# Patient Record
Sex: MALE | Race: WHITE | Employment: FULL TIME | URBAN - METROPOLITAN AREA
[De-identification: names, ages, dates, MRNs, and addresses within clinical notes are randomized per-mention and may not be internally consistent; named-entity substitution may affect disease eponyms.]

---

## 2017-03-14 ENCOUNTER — OFFICE VISIT (OUTPATIENT)
Dept: URGENT CARE | Facility: URGENT CARE | Age: 57
End: 2017-03-14
Payer: COMMERCIAL

## 2017-03-14 VITALS
HEART RATE: 58 BPM | SYSTOLIC BLOOD PRESSURE: 120 MMHG | BODY MASS INDEX: 25.29 KG/M2 | DIASTOLIC BLOOD PRESSURE: 72 MMHG | WEIGHT: 181.25 LBS | OXYGEN SATURATION: 99 % | TEMPERATURE: 97 F

## 2017-03-14 DIAGNOSIS — S61.209A OPEN WOUND OF FINGER, INITIAL ENCOUNTER: Primary | ICD-10-CM

## 2017-03-14 PROCEDURE — 12001 RPR S/N/AX/GEN/TRNK 2.5CM/<: CPT | Performed by: FAMILY MEDICINE

## 2017-03-14 PROCEDURE — 99207 ZZC NO CHARGE LOS: CPT | Performed by: FAMILY MEDICINE

## 2017-03-14 NOTE — NURSING NOTE
Triaged for finger laceration. Reports episode was 1 hour ago and cut little finger with bread knife. About 1 inch cut with bleeding controlled. Reports end of finger has some numbness. Given dressing to applied continued pressure to cut. Stable and in no distress. Will wait for rooming in . Brooke Kahn RN

## 2017-03-14 NOTE — MR AVS SNAPSHOT
After Visit Summary   3/14/2017    Forest Sandoval    MRN: [id number]           Patient Information     Date Of Birth          1960        Visit Information        Provider Department      3/14/2017 6:40 PM Nate Kent MD [facility]        Today's Diagnoses     Open wound of finger, initial encounter    -  1      Care Instructions      INSTRUCTIONS TO PATIENTS AND RELATIVES REGARDING LACERATIONS    These instructions are for persons who have had lacerations (jagged or deep wounds or cuts) that require special bandaging and/or stitches.    1.  DO NOT REMOVE the bandage applied by the nurse or physician for 24 hours unless you have received other instructions from you physician.    AFTER 24 HOURS:    2.  KEEP AREA CLEAN, DRY AND COVERED.        *  Clean abrasions and lacerations without stitches 1 -2 times a day with soap and water.      *  DO NO  SOAK or IMMERSE LACERATION in water for a prolonged length of time such as bathing, swimming or washing dishes.      *  APPLY ANTIBIOTIC OINTMENT and a clean bandage.      DO NOT WASH AREAS WITH STITCHS OR ALLOW STITICHS TO BECOME WET. KEEP AREA CLEAN, DRY AND COVERED.      FACIAL AND SCALP LACERAATIONS DO NOT NEED A BANDAGE    3.  TREAT PAIN OR DISCOMFORT WITH:      *  Ibuprofen or Tylenol every four hours.      *  ELEVATE wound to decrease swelling and pain.    4.  WATCH FOR SIGNS OF INFECTION      *  Redness      *  Swelling      *  Pus      *  Red streaks around wound      *  Increased pain      *  Fever      In the event that any of the symptoms listed occur, call your physician or the Emergency Room.     [facility] [place]  [phone]  [facility] [place]  [phone]  [facility] [place]  [phone]  [place] [facility]  [phone]  [facility] EMERGENCY ROOM  [phone]    ADDITIONAL INSTRUCTIONS          Follow-ups after  "your visit        Who to contact     If you have questions or need follow up information about today's clinic visit or your schedule please contact [facility] directly at [phone].  Normal or non-critical lab and imaging results will be communicated to you by MyChart, letter or phone within 4 business days after the clinic has received the results. If you do not hear from us within 7 days, please contact the clinic through MyChart or phone. If you have a critical or abnormal lab result, we will notify you by phone as soon as possible.  Submit refill requests through [facility] or call your pharmacy and they will forward the refill request to us. Please allow 3 business days for your refill to be completed.          Additional Information About Your Visit        [facility] Information     [facility] lets you send messages to your doctor, view your test results, renew your prescriptions, schedule appointments and more. To sign up, go to [email or web address] . Click on \"Log in\" on the left side of the screen, which will take you to the Welcome page. Then click on \"Sign up Now\" on the right side of the page.     You will be asked to enter the access code listed below, as well as some personal information. Please follow the directions to create your username and password.     Your access code is: [email or web address]  Expires: 2017  8:25 PM     Your access code will  in 90 days. If you need help or a new code, please call your [facility] clinic or [phone].        Care EveryWhere ID     This is your Care EveryWhere ID. This could be used by other organizations to access your [facility] medical records  [id number]        Your Vitals Were     Pulse Temperature Pulse Oximetry BMI (Body Mass Index)          58 97  F (36.1  C) (Oral) 99% 25.29 kg/m2         Blood Pressure from Last 3 Encounters:   17 120/72   12/28/15 104/74   06/19/15 113/76    Weight from Last 3 Encounters:   17 181 lb 4 oz " (82.2 kg)   12/23/15 173 lb 12.8 oz (78.8 kg)   06/19/15 173 lb 12.8 oz (78.8 kg)              We Performed the Following     WOUND CLOSURE BY ADHESIVE []          Today's Medication Changes          These changes are accurate as of: 3/14/17  8:25 PM.  If you have any questions, ask your nurse or doctor.               Stop taking these medicines if you haven't already. Please contact your care team if you have questions.     Azelaic Acid 15 % gel   Commonly known as:  FINACEA   Stopped by:  Nate Kent MD           doxycycline Monohydrate 50 MG Caps capsule   Stopped by:  Nate Kent MD           metroNIDAZOLE 0.75 % topical gel   Commonly known as:  METROGEL   Stopped by:  Nate Kent MD                    Primary Care Provider Office Phone # Fax Bradley Tobaric Quinn Sebastian PA-C [phone]       XXX RESIGNED XXX [id number]  [place] [address]        Thank you!     Thank you for choosing [facility]  for your care. Our goal is always to provide you with excellent care. Hearing back from our patients is one way we can continue to improve our services. Please take a few minutes to complete the written survey that you may receive in the mail after your visit with us. Thank you!             Your Updated Medication List - Protect others around you: Learn how to safely use, store and throw away your medicines at www.disposemymeds.org.      Notice  As of 3/14/2017  8:25 PM    You have not been prescribed any medications.

## 2017-03-15 NOTE — PATIENT INSTRUCTIONS
INSTRUCTIONS TO PATIENTS AND RELATIVES REGARDING LACERATIONS    These instructions are for persons who have had lacerations (jagged or deep wounds or cuts) that require special bandaging and/or stitches.    1.  DO NOT REMOVE the bandage applied by the nurse or physician for 24 hours unless you have received other instructions from you physician.    AFTER 24 HOURS:    2.  KEEP AREA CLEAN, DRY AND COVERED.        *  Clean abrasions and lacerations without stitches 1 -2 times a day with soap and water.      *  DO NO  SOAK or IMMERSE LACERATION in water for a prolonged length of time such as bathing, swimming or washing dishes.      *  APPLY ANTIBIOTIC OINTMENT and a clean bandage.      DO NOT WASH AREAS WITH STITCHS OR ALLOW STITICHS TO BECOME WET. KEEP AREA CLEAN, DRY AND COVERED.      FACIAL AND SCALP LACERAATIONS DO NOT NEED A BANDAGE    3.  TREAT PAIN OR DISCOMFORT WITH:      *  Ibuprofen or Tylenol every four hours.      *  ELEVATE wound to decrease swelling and pain.    4.  WATCH FOR SIGNS OF INFECTION      *  Redness      *  Swelling      *  Pus      *  Red streaks around wound      *  Increased pain      *  Fever      In the event that any of the symptoms listed occur, call your physician or the Emergency Room.     [facility] [place]  [phone]  [facility] [place]  [phone]  [facility] IN [place]  [phone]  [place] [facility]  [phone]  [facility] EMERGENCY ROOM  [phone]    ADDITIONAL INSTRUCTIONS

## 2017-03-15 NOTE — NURSING NOTE
"Chief Complaint   Patient presents with     Laceration       Initial /72  Pulse 58  Temp 97  F (36.1  C) (Oral)  Wt 181 lb 4 oz (82.2 kg)  SpO2 99%  BMI 25.29 kg/m2 Estimated body mass index is 25.29 kg/(m^2) as calculated from the following:    Height as of 12/23/15: 5' 10.98\" (1.803 m).    Weight as of this encounter: 181 lb 4 oz (82.2 kg).  Medication Reconciliation: complete       ARLENE Smith      "

## 2017-03-15 NOTE — PROGRESS NOTES
SUBJECTIVE:                                                    Forest Sandoval is a 56 year old male who presents to clinic today for the following health issues:      Laceration      Duration: 1 hour ago     Description (location/character/radiation): right pinky     History: was reaching bread knife from block     Precipitating or alleviating factors: UTD on Tdap            Problem list and histories reviewed & adjusted, as indicated.  Additional history: as documented    Patient Active Problem List   Diagnosis     Hyperlipidemia     Dermatitis     BPH (benign prostatic hyperplasia)     DJD (degenerative joint disease) of knee     COPD, mild (H)     Generalized anxiety disorder     Past Surgical History   Procedure Laterality Date     No history of surgery       Colonoscopy N/A 12/28/2015     Procedure: COLONOSCOPY;  Surgeon: eDz Pickard MD;  Location: [facility] OR     Colonoscopy with co2 insufflation N/A 12/28/2015     Procedure: COLONOSCOPY WITH CO2 INSUFFLATION;  Surgeon: Dez Pickard MD;  Location: [facility] OR       Social History   Substance Use Topics     Smoking status: Former Smoker     Smokeless tobacco: Never Used     Alcohol use Not on file     Family History   Problem Relation Age of Onset     Cardiovascular Mother      Cardiovascular Father      Alcohol/Drug Father      CANCER Sister      BCC and Melanoma           ROS:  INTEGUMENTARY/SKIN: 1.5 com laceration to right fifth digit radial border distal phalanx.    OBJECTIVE:                                                    /72  Pulse 58  Temp 97  F (36.1  C) (Oral)  Wt 181 lb 4 oz (82.2 kg)  SpO2 99%  BMI 25.29 kg/m2  Body mass index is 25.29 kg/(m^2).  GENERAL: healthy, alert and no distress  MS: no gross musculoskeletal defects noted, no edema  SKIN: laceration as described in ROS  NEURO: Normal strength and tone and sensory deficit radial side of right 5th digit has numbness to light touch distal to wound.    Diagnostic Test Results:  none       ASSESSMENT/PLAN:                                                            ICD-10-CM    1. Open wound of finger, initial encounter S61.209A WOUND CLOSURE BY ADHESIVE []       See Patient Instructions    Nate Kent MD  [facility]  SUBJECTIVE:   56 year old male sustained laceration of finger 1 hours ago. Nature of injury: cut on clean kitchen knife. Tetanus vaccination status reviewed: last tetanus booster within 10 years.     OBJECTIVE:   Patient appears well, vitals are normal. Laceration 2 cm noted.  Description: clean wound edges, no foreign bodies. See above.     ASSESSMENT:   Laceration as described.    PLAN:   Discussed wound treatment options with patient and opted for steri-strips.  Wound cleansed, debrided of visible foreign material and necrotic tissue, and reinforced with benzoin and steri-strips. Splinted with gauze dressing and coban. Dressing applied.  Wound care instructions provided.  Observe for any signs of infection or other problems.

## 2017-05-14 ENCOUNTER — HOSPITAL ENCOUNTER (EMERGENCY)
Facility: CLINIC | Age: 57
Discharge: HOME | End: 2017-05-14
Attending: PHYSICIAN ASSISTANT | Admitting: PHYSICIAN ASSISTANT
Payer: COMMERCIAL

## 2017-05-14 VITALS
HEIGHT: 72 IN | HEART RATE: 73 BPM | RESPIRATION RATE: 16 BRPM | TEMPERATURE: 97.9 F | BODY MASS INDEX: 23.84 KG/M2 | DIASTOLIC BLOOD PRESSURE: 88 MMHG | OXYGEN SATURATION: 98 % | SYSTOLIC BLOOD PRESSURE: 151 MMHG | WEIGHT: 176 LBS

## 2017-05-14 DIAGNOSIS — T23.239A: ICD-10-CM

## 2017-05-14 DIAGNOSIS — T23.209A: ICD-10-CM

## 2017-05-14 PROCEDURE — 99213 OFFICE O/P EST LOW 20 MIN: CPT | Mod: 25

## 2017-05-14 PROCEDURE — 90471 IMMUNIZATION ADMIN: CPT

## 2017-05-14 PROCEDURE — 90715 TDAP VACCINE 7 YRS/> IM: CPT | Performed by: PHYSICIAN ASSISTANT

## 2017-05-14 PROCEDURE — 25000125 ZZHC RX 250: Performed by: PHYSICIAN ASSISTANT

## 2017-05-14 PROCEDURE — 16020 DRESS/DEBRID P-THICK BURN S: CPT | Performed by: PHYSICIAN ASSISTANT

## 2017-05-14 PROCEDURE — 99213 OFFICE O/P EST LOW 20 MIN: CPT | Mod: 25 | Performed by: PHYSICIAN ASSISTANT

## 2017-05-14 PROCEDURE — 16020 DRESS/DEBRID P-THICK BURN S: CPT

## 2017-05-14 RX ADMIN — CLOSTRIDIUM TETANI TOXOID ANTIGEN (FORMALDEHYDE INACTIVATED), CORYNEBACTERIUM DIPHTHERIAE TOXOID ANTIGEN (FORMALDEHYDE INACTIVATED), BORDETELLA PERTUSSIS TOXOID ANTIGEN (GLUTARALDEHYDE INACTIVATED), BORDETELLA PERTUSSIS FILAMENTOUS HEMAGGLUTININ ANTIGEN (FORMALDEHYDE INACTIVATED), BORDETELLA PERTUSSIS PERTACTIN ANTIGEN, AND BORDETELLA PERTUSSIS FIMBRIAE 2/3 ANTIGEN 0.5 ML: 5; 2; 2.5; 5; 3; 5 INJECTION, SUSPENSION INTRAMUSCULAR at 18:04

## 2017-05-14 NOTE — DISCHARGE INSTRUCTIONS
Second-Degree Burn  A burn occurs when skin is exposed to too much heat, sun, or harsh chemicals. A second-degree burn (partial-thickness burn) is deeper than a first-degree burn (superficial burn). It usually causes a blister to form. The blister may remain intact and gradually go away on its own. Or it may break open. The goal of treatment is to relieve pain and stop infection while the burn heals.   Home care  Use pain medicine as directed. If no pain medicine was prescribed, you may use acetaminophen or ibuprofen to control pain. If you have chronic liver or kidney disease, talk with your health care provider before using these medicine. Also talk with your provider if you've had a stomach ulcer or GI bleeding.  General care    On the first day, you may put a cool compress on the wound to ease pain. A cool compress is a small towel soaked in cool water.    If you were sent home with the blister intact, don't break the blister. The risk for infection is greater if the blister breaks. If a bandage was applied, change it once a day, unless told otherwise. If the bandage becomes wet or soiled, change it as soon as you can.    Sometimes an infection may occur even with proper treatment. Check the burn daily for the signs of infection listed below.    Eat more calories and protein until your wound is healed.    Wear a hat, sunscreen, and long sleeves while in the sun to protect the skin.    Don't pick or scratch at the wound. Use over-the-counter medicines like diphenhydramine for itching.    Avoid tight-fitting clothes.  To change a bandage:    Wash your hands.    Take off the old bandage. If the bandage sticks, soak it off under warm running water.    Once the bandage is off, gently wash the burn area with mild soap and warm water to remove any cream, ointment, ooze, or scab. You may do this in a sink, under a tub faucet, or in the shower. Rinse off the soap and gently pat dry with a clean towel.    Check for  signs of infection listed below.    Put any prescribed antibiotic cream or ointment on the wound.    Cover the burn with nonstick gauze. Then wrap it with the bandage material.  Follow-up care  Follow up with your health care provider, or as advised.  When to seek medical advice  Call your health care provider right away if you have any of these signs of infection:    Fever over 100.4 F (38 C)    Pain that gets worse    Redness or swelling that gets worse    Pus comes from the burn    Red streaks in your skin coming from the burn    Wound doesn't appear to be healing    Nausea or vomiting     1719-9685 [employer]. [address] [place] [address], [place] [address]. All rights reserved. This information is not intended as a substitute for professional medical care. Always follow your healthcare professional's instructions.

## 2017-05-14 NOTE — ED AVS SNAPSHOT
[facility] Emergency Department    [address]    [facility] [address]    Phone:  [phone]    Fax:  [phone]                                       Forest Sandoval   MRN: [id number]    Department:  [facility] Emergency Department   Date of Visit:  5/14/2017           Patient Information     Date Of Birth          1960        Your diagnoses for this visit were:     Partial thickness burn of hand including fingers        You were seen by Anabelle Schuster PA-C.      Follow-up Information     Follow up with [facility]. Call in 1 day.    Why:  [phone] ([phone])    Contact information:    [facility]  [address] [address]          Discharge Instructions         Second-Degree Burn  A burn occurs when skin is exposed to too much heat, sun, or harsh chemicals. A second-degree burn (partial-thickness burn) is deeper than a first-degree burn (superficial burn). It usually causes a blister to form. The blister may remain intact and gradually go away on its own. Or it may break open. The goal of treatment is to relieve pain and stop infection while the burn heals.   Home care  Use pain medicine as directed. If no pain medicine was prescribed, you may use acetaminophen or ibuprofen to control pain. If you have chronic liver or kidney disease, talk with your health care provider before using these medicine. Also talk with your provider if you've had a stomach ulcer or GI bleeding.  General care    On the first day, you may put a cool compress on the wound to ease pain. A cool compress is a small towel soaked in cool water.    If you were sent home with the blister intact, don't break the blister. The risk for infection is greater if the blister breaks. If a bandage was applied, change it once a day, unless told otherwise. If the bandage becomes wet or soiled, change it as soon as you can.    Sometimes an infection may occur even with proper treatment. Check the burn daily  for the signs of infection listed below.    Eat more calories and protein until your wound is healed.    Wear a hat, sunscreen, and long sleeves while in the sun to protect the skin.    Don't pick or scratch at the wound. Use over-the-counter medicines like diphenhydramine for itching.    Avoid tight-fitting clothes.  To change a bandage:    Wash your hands.    Take off the old bandage. If the bandage sticks, soak it off under warm running water.    Once the bandage is off, gently wash the burn area with mild soap and warm water to remove any cream, ointment, ooze, or scab. You may do this in a sink, under a tub faucet, or in the shower. Rinse off the soap and gently pat dry with a clean towel.    Check for signs of infection listed below.    Put any prescribed antibiotic cream or ointment on the wound.    Cover the burn with nonstick gauze. Then wrap it with the bandage material.  Follow-up care  Follow up with your health care provider, or as advised.  When to seek medical advice  Call your health care provider right away if you have any of these signs of infection:    Fever over 100.4 F (38 C)    Pain that gets worse    Redness or swelling that gets worse    Pus comes from the burn    Red streaks in your skin coming from the burn    Wound doesn't appear to be healing    Nausea or vomiting     6128-5001 [employer]. [address] [place] [address], [place] [address]. All rights reserved. This information is not intended as a substitute for professional medical care. Always follow your healthcare professional's instructions.          24 Hour Appointment Hotline       To make an appointment at any [facility] clinic, call [phone] ([phone]). If you don't have a family doctor or clinic, we will help you find one. [facility] clinics are conveniently located to serve the needs of you and your family.             Review of your medicines      Notice     You have not been prescribed any medications.           "  Orders Needing Specimen Collection     None      Pending Results     No orders found from 2017 to 5/15/2017.            Pending Culture Results     No orders found from 2017 to 5/15/2017.            Pending Results Instructions     If you had any lab results that were not finalized at the time of your Discharge, you can call the ED Lab Result RN at [phone]. You will be contacted by this team for any positive Lab results or changes in treatment. The nurses are available 7 days a week from 10A to 6:30P.  You can leave a message 24 hours per day and they will return your call.        Test Results From Your Hospital Stay               Thank you for choosing [facility]       Thank you for choosing [facility] for your care. Our goal is always to provide you with excellent care. Hearing back from our patients is one way we can continue to improve our services. Please take a few minutes to complete the written survey that you may receive in the mail after you visit with us. Thank you!        [facility] Information     [facility] lets you send messages to your doctor, view your test results, renew your prescriptions, schedule appointments and more. To sign up, go to [email or web address] . Click on \"Log in\" on the left side of the screen, which will take you to the Welcome page. Then click on \"Sign up Now\" on the right side of the page.     You will be asked to enter the access code listed below, as well as some personal information. Please follow the directions to create your username and password.     Your access code is: [email or web address]  Expires: 2017  8:25 PM     Your access code will  in 90 days. If you need help or a new code, please call your [facility] clinic or [phone].        Care EveryWhere ID     This is your Care EveryWhere ID. This could be used by other organizations to access your [facility] medical records  [id number]        After Visit Summary       This is your record. Keep this with " you and show to your community pharmacist(s) and doctor(s) at your next visit.
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
## 2017-05-14 NOTE — ED AVS SNAPSHOT
[facility] Emergency Department    [address] [address]    Phone:  [phone]    Fax:  [phone]                                       Forest Sandoval   MRN: [id number]    Department:  [facility] Emergency Department   Date of Visit:  5/14/2017           After Visit Summary Signature Page     I have received my discharge instructions, and my questions have been answered. I have discussed any challenges I see with this plan with the nurse or doctor.    ..........................................................................................................................................  Patient/Patient Representative Signature      ..........................................................................................................................................  Patient Representative Print Name and Relationship to Patient    ..................................................               ................................................  Date                                            Time    ..........................................................................................................................................  Reviewed by Signature/Title    ...................................................              ..............................................  Date                                                            Time

## 2018-06-27 ENCOUNTER — OFFICE VISIT (OUTPATIENT)
Dept: FAMILY MEDICINE | Facility: CLINIC | Age: 58
End: 2018-06-27
Payer: COMMERCIAL

## 2018-06-27 VITALS
WEIGHT: 172 LBS | HEART RATE: 52 BPM | TEMPERATURE: 97.1 F | DIASTOLIC BLOOD PRESSURE: 74 MMHG | SYSTOLIC BLOOD PRESSURE: 113 MMHG | RESPIRATION RATE: 16 BRPM | OXYGEN SATURATION: 96 % | HEIGHT: 71 IN | BODY MASS INDEX: 24.08 KG/M2

## 2018-06-27 DIAGNOSIS — N40.1 BENIGN PROSTATIC HYPERPLASIA WITH URINARY FREQUENCY: ICD-10-CM

## 2018-06-27 DIAGNOSIS — J44.9 COPD, MILD (H): ICD-10-CM

## 2018-06-27 DIAGNOSIS — Z00.01 ENCOUNTER FOR ROUTINE ADULT HEALTH EXAMINATION WITH ABNORMAL FINDINGS: Primary | ICD-10-CM

## 2018-06-27 DIAGNOSIS — R35.0 BENIGN PROSTATIC HYPERPLASIA WITH URINARY FREQUENCY: ICD-10-CM

## 2018-06-27 DIAGNOSIS — E78.5 HYPERLIPIDEMIA LDL GOAL <160: ICD-10-CM

## 2018-06-27 DIAGNOSIS — Z23 NEED FOR VACCINATION: ICD-10-CM

## 2018-06-27 LAB
ALBUMIN SERPL-MCNC: 3.7 G/DL (ref 3.4–5)
ALP SERPL-CCNC: 68 U/L (ref 40–150)
ALT SERPL W P-5'-P-CCNC: 18 U/L (ref 0–70)
ANION GAP SERPL CALCULATED.3IONS-SCNC: 5 MMOL/L (ref 3–14)
AST SERPL W P-5'-P-CCNC: 24 U/L (ref 0–45)
BILIRUB SERPL-MCNC: 0.4 MG/DL (ref 0.2–1.3)
BUN SERPL-MCNC: 20 MG/DL (ref 7–30)
CALCIUM SERPL-MCNC: 9.3 MG/DL (ref 8.5–10.1)
CHLORIDE SERPL-SCNC: 106 MMOL/L (ref 94–109)
CHOLEST SERPL-MCNC: 218 MG/DL
CO2 SERPL-SCNC: 29 MMOL/L (ref 20–32)
CREAT SERPL-MCNC: 1.12 MG/DL (ref 0.66–1.25)
GFR SERPL CREATININE-BSD FRML MDRD: 68 ML/MIN/1.7M2
GLUCOSE SERPL-MCNC: 95 MG/DL (ref 70–99)
HCV AB SERPL QL IA: REACTIVE
HDLC SERPL-MCNC: 66 MG/DL
LDLC SERPL CALC-MCNC: 136 MG/DL
NONHDLC SERPL-MCNC: 152 MG/DL
POTASSIUM SERPL-SCNC: 4.6 MMOL/L (ref 3.4–5.3)
PROT SERPL-MCNC: 7.3 G/DL (ref 6.8–8.8)
PSA SERPL-ACNC: 2.39 UG/L (ref 0–4)
SODIUM SERPL-SCNC: 140 MMOL/L (ref 133–144)
TRIGL SERPL-MCNC: 82 MG/DL

## 2018-06-27 PROCEDURE — 90471 IMMUNIZATION ADMIN: CPT | Performed by: PHYSICIAN ASSISTANT

## 2018-06-27 PROCEDURE — 99396 PREV VISIT EST AGE 40-64: CPT | Mod: 25 | Performed by: PHYSICIAN ASSISTANT

## 2018-06-27 PROCEDURE — 80053 COMPREHEN METABOLIC PANEL: CPT | Performed by: PHYSICIAN ASSISTANT

## 2018-06-27 PROCEDURE — G0103 PSA SCREENING: HCPCS | Performed by: PHYSICIAN ASSISTANT

## 2018-06-27 PROCEDURE — 99213 OFFICE O/P EST LOW 20 MIN: CPT | Mod: 25 | Performed by: PHYSICIAN ASSISTANT

## 2018-06-27 PROCEDURE — 80061 LIPID PANEL: CPT | Performed by: PHYSICIAN ASSISTANT

## 2018-06-27 PROCEDURE — 36415 COLL VENOUS BLD VENIPUNCTURE: CPT | Performed by: PHYSICIAN ASSISTANT

## 2018-06-27 PROCEDURE — 86803 HEPATITIS C AB TEST: CPT | Performed by: PHYSICIAN ASSISTANT

## 2018-06-27 PROCEDURE — 90732 PPSV23 VACC 2 YRS+ SUBQ/IM: CPT | Performed by: PHYSICIAN ASSISTANT

## 2018-06-27 PROCEDURE — 87522 HEPATITIS C REVRS TRNSCRPJ: CPT | Performed by: PHYSICIAN ASSISTANT

## 2018-06-27 RX ORDER — DOXAZOSIN 4 MG/1
4 TABLET ORAL AT BEDTIME
Qty: 90 TABLET | Refills: 1 | Status: SHIPPED | OUTPATIENT
Start: 2018-06-27 | End: 2019-01-04

## 2018-06-27 RX ORDER — ALBUTEROL SULFATE 90 UG/1
1-2 AEROSOL, METERED RESPIRATORY (INHALATION) EVERY 6 HOURS PRN
Qty: 1 INHALER | Refills: 1 | Status: SHIPPED | OUTPATIENT
Start: 2018-06-27 | End: 2023-01-31

## 2018-06-27 NOTE — NURSING NOTE
Screening Questionnaire for Adult Immunization    Are you sick today?   No   Do you have allergies to medications, food, a vaccine component or latex?   Yes   Have you ever had a serious reaction after receiving a vaccination?   No   Do you have a long-term health problem with heart disease, lung disease, asthma, kidney disease, metabolic disease (e.g. diabetes), anemia, or other blood disorder?   No   Do you have cancer, leukemia, HIV/AIDS, or any other immune system problem?   No   In the past 3 months, have you taken medications that affect  your immune system, such as prednisone, other steroids, or anticancer drugs; drugs for the treatment of rheumatoid arthritis, Crohn s disease, or psoriasis; or have you had radiation treatments?   No   Have you had a seizure, or a brain or other nervous system problem?   No   During the past year, have you received a transfusion of blood or blood     products, or been given immune (gamma) globulin or antiviral drug?   No   For women: Are you pregnant or is there a chance you could become        pregnant during the next month?   No   Have you received any vaccinations in the past 4 weeks?   No     Immunization questionnaire was positive for at least one answer.  .        Per orders of Marly Kimball PA-C, injection of [id number] given by Jesu Noguera. Patient instructed to remain in clinic for 15 minutes afterwards, and to report any adverse reaction to me immediately.       Screening performed by Jesu Noguera on 6/27/2018 at 11:57 AM.

## 2018-06-27 NOTE — LETTER
July 3, 2018      Forest Sandoval  [address] [address] [address]        Dear ,    We are writing to inform you of your test results.    Your hepatitis C screen was initially a FALSE positive, the confirmatory test was negative. This can happen, but you DO NOT have hepatitis C.   Electrolytes, kidney function, liver enzymes, and the prostate marker are all normal.   Your blood sugar is normal - no signs of diabetes.   Your LDL (bad) cholesterol is <160 and therefore at goal - your cholesterol has improved.     Resulted Orders   Comprehensive metabolic panel   Result Value Ref Range    Sodium 140 133 - 144 mmol/L    Potassium 4.6 3.4 - 5.3 mmol/L    Chloride 106 94 - 109 mmol/L    Carbon Dioxide 29 20 - 32 mmol/L    Anion Gap 5 3 - 14 mmol/L    Glucose 95 70 - 99 mg/dL      Comment:      Fasting specimen    Urea Nitrogen 20 7 - 30 mg/dL    Creatinine 1.12 0.66 - 1.25 mg/dL    GFR Estimate 68 >60 mL/min/1.7m2      Comment:      Non  GFR Calc    GFR Estimate If Black 82 >60 mL/min/1.7m2      Comment:       GFR Calc    Calcium 9.3 8.5 - 10.1 mg/dL    Bilirubin Total 0.4 0.2 - 1.3 mg/dL    Albumin 3.7 3.4 - 5.0 g/dL    Protein Total 7.3 6.8 - 8.8 g/dL    Alkaline Phosphatase 68 40 - 150 U/L    ALT 18 0 - 70 U/L    AST 24 0 - 45 U/L   Lipid panel reflex to direct LDL Fasting   Result Value Ref Range    Cholesterol 218 (H) <200 mg/dL      Comment:      Desirable:       <200 mg/dl    Triglycerides 82 <150 mg/dL      Comment:      Fasting specimen    HDL Cholesterol 66 >39 mg/dL    LDL Cholesterol Calculated 136 (H) <100 mg/dL      Comment:      Above desirable:  100-129 mg/dl  Borderline High:  130-159 mg/dL  High:             160-189 mg/dL  Very high:       >189 mg/dl      Non HDL Cholesterol 152 (H) <130 mg/dL      Comment:      Above Desirable:  130-159 mg/dl  Borderline high:  160-189 mg/dl  High:             190-219 mg/dl  Very high:       >219 mg/dl     Prostate  spec antigen screen   Result Value Ref Range    PSA 2.39 0 - 4 ug/L      Comment:      Assay Method:  Chemiluminescence using Siemens Vista analyzer   Hepatitis C Screen Reflex to HCV RNA Quant and Genotype   Result Value Ref Range    Hepatitis C Antibody Reactive (AA) NR^Nonreactive      Comment:      A reactive result indicates one of the following 1) current HCV infection 2)   past HCV infection that has resolved or 3) false positivity. The CDC   recommends that a reactive result should be followed by Nucleic acid testing   for HCV RNA.   If HCV RNA is detected, that indicates current HCV infection. If HCV RNA is   not detected, that indicates either past, resolved HCV infection, or false HCV   antibody positivity.  Assay performance characteristics have not been established for newborns,   infants, and children     Hepatitis C RNA Quantitative   Result Value Ref Range    HCV RNA Quant IU/ml HCV RNA Not Detected HCVND^HCV RNA Not Detected [IU]/mL      Comment:      The JULIETA AmpliPrep/JULIETA TaqMan HCV Test is an FDA-approved in vitro nucleic   acid amplification test for the quantitation of HCV RNA in human plasma (ETDA   plasma) or serum using the JULIETA AmpliPrep Instrument for automated viral   nucleic acid extraction and the JULIETA TaqMan Analyzer or JULIETA TaqMan for   automated Real Time PCR amplification and detection of the viral nucleic acid   target.  Titer results are reported in International Units/mL (IU/mL) using the 1st WHO   International standard for HCV for Nucleic Acid Amplification based assays.      Log of HCV RNA Qt Not Calculated <1.2 Log IU/mL       If you have any questions or concerns, please call the clinic at the number listed above.       Sincerely,        Marly Kimball PA-C/rico

## 2018-06-27 NOTE — MR AVS SNAPSHOT
After Visit Summary   6/27/2018    Forest Sandoval    MRN: [id number]           Patient Information     Date Of Birth          1960        Visit Information        Provider Department      6/27/2018 8:00 AM Marly Kimball PA-C [facility]        Today's Diagnoses     Hyperlipidemia LDL goal <160    -  1    Encounter for routine adult health examination with abnormal findings        Benign prostatic hyperplasia with urinary frequency        COPD, mild (H)          Care Instructions      Preventive Health Recommendations  Male Ages 50 - 64    Yearly exam:             See your health care provider every year in order to  o   Review health changes.   o   Discuss preventive care.    o   Review your medicines if your doctor has prescribed any.     Have a cholesterol test every 5 years, or more frequently if you are at risk for high cholesterol/heart disease.     Have a diabetes test (fasting glucose) every three years. If you are at risk for diabetes, you should have this test more often.     Have a colonoscopy at age 50, or have a yearly FIT test (stool test). These exams will check for colon cancer.      Talk with your health care provider about whether or not a prostate cancer screening test (PSA) is right for you.    You should be tested each year for STDs (sexually transmitted diseases), if you re at risk.     Shots: Get a flu shot each year. Get a tetanus shot every 10 years.     Nutrition:    Eat at least 5 servings of fruits and vegetables daily.     Eat whole-grain bread, whole-wheat pasta and brown rice instead of white grains and rice.     Get adequate Calcium and Vitamin D.     Lifestyle    Exercise for at least 150 minutes a week (30 minutes a day, 5 days a week). This will help you control your weight and prevent disease.     Limit alcohol to one drink per day.     No smoking.     Wear sunscreen to prevent skin cancer.     See your dentist every six months for an exam  and cleaning.     See your eye doctor every 1 to 2 years.            Follow-ups after your visit        Additional Services     GASTROENTEROLOGY ADULT REF PROCEDURE ONLY       Last Lab Result: No results found for: CR  Body mass index is 23.84 kg/(m^2).     Needed:  No  Language:  English    Patient will be contacted to schedule procedure.     Please be aware that coverage of these services is subject to the terms and limitations of your health insurance plan.  Call member services at your health plan with any benefit or coverage questions.  Any procedures must be performed at a [place] facility OR coordinated by your clinic's referral office.    Please bring the following with you to your appointment:    (1) Any X-Rays, CTs or MRIs which have been performed.  Contact the facility where they were done to arrange for  prior to your scheduled appointment.    (2) List of current medications   (3) This referral request   (4) Any documents/labs given to you for this referral            OPTOMETRY REFERRAL       Your provider has referred you to: [facility]: [facility] [phone]    [email or web address]    Please be aware that coverage of these services is subject to the terms and limitations of your health insurance plan.  Call member services at your health plan with any benefit or coverage questions.      Please bring the following with you to your appointment:    (1) Any X-Rays, CTs or MRIs which have been performed.  Contact the facility where they were done to arrange for  prior to your scheduled appointment.    (2) List of current medications  (3) This referral request   (4) Any documents/labs given to you for this referral            UROLOGY ADULT REFERRAL       Your provider has referred you to: [facility]: [facility] [phone]   [email or web address]    Please be aware that coverage of  "these services is subject to the terms and limitations of your health insurance plan.  Call member services at your health plan with any benefit or coverage questions.      Please bring the following with you to your appointment:    (1) Any X-Rays, CTs or MRIs which have been performed.  Contact the facility where they were done to arrange for  prior to your scheduled appointment.    (2) List of current medications  (3) This referral request   (4) Any documents/labs given to you for this referral                  Who to contact     Normal or non-critical lab and imaging results will be communicated to you by MyChart, letter or phone within 4 business days after the clinic has received the results. If you do not hear from us within 7 days, please contact the clinic through MyChart or phone. If you have a critical or abnormal lab result, we will notify you by phone as soon as possible.  Submit refill requests through [facility] or call your pharmacy and they will forward the refill request to us. Please allow 3 business days for your refill to be completed.          If you need to speak with a  for additional information , please call: [phone]             Additional Information About Your Visit        Care EveryWhere ID     This is your Care EveryWhere ID. This could be used by other organizations to access your [facility] medical records  [id number]        Your Vitals Were     Pulse Temperature Respirations Height Pulse Oximetry BMI (Body Mass Index)    52 97.1  F (36.2  C) (Tympanic) 16 5' 11.22\" (1.809 m) 96% 23.84 kg/m2       Blood Pressure from Last 3 Encounters:   06/27/18 113/74   05/14/17 151/88   03/14/17 120/72    Weight from Last 3 Encounters:   06/27/18 172 lb (78 kg)   05/14/17 176 lb (79.8 kg)   03/14/17 181 lb 4 oz (82.2 kg)              We Performed the Following     Comprehensive metabolic panel     GASTROENTEROLOGY ADULT REF PROCEDURE ONLY     Hepatitis C Screen Reflex to " HCV RNA Quant and Genotype     Lipid panel reflex to direct LDL Fasting     OPTOMETRY REFERRAL     Prostate spec antigen screen     UROLOGY ADULT REFERRAL          Today's Medication Changes          These changes are accurate as of 6/27/18  8:51 AM.  If you have any questions, ask your nurse or doctor.               Start taking these medicines.        Dose/Directions    albuterol 108 (90 Base) MCG/ACT Inhaler   Commonly known as:  PROAIR HFA/PROVENTIL HFA/VENTOLIN HFA   Used for:  COPD, mild (H)   Started by:  Marly Kimball PA-C        Dose:  1-2 puff   Inhale 1-2 puffs into the lungs every 6 hours as needed for shortness of breath / dyspnea or wheezing   Quantity:  1 Inhaler   Refills:  1       doxazosin 4 MG tablet   Commonly known as:  CARDURA   Used for:  Benign prostatic hyperplasia with urinary frequency   Started by:  Marly Kimball PA-C        Dose:  4 mg   Take 1 tablet (4 mg) by mouth At Bedtime   Quantity:  90 tablet   Refills:  1            Where to get your medicines      These medications were sent to [facility] [place] [address] [address] [place] [address] [facility] [address]     Phone:  [phone]     albuterol 108 (90 Base) MCG/ACT Inhaler    doxazosin 4 MG tablet                Primary Care Provider Fax #    Physician No Ref-Primary [phone]       No address on file        Equal Access to Services     ALIE WILKINS : Hadii cherie may hadasho Soomaali, waaxda luqadaha, qaybta kaalmada adeegyada, elli moran. So [facility] [phone].    ATENCIÓN: Si habla español, tiene a raoms disposición servicios gratuitos de asistencia lingüística. Bakari cunha [phone].    We comply with applicable federal civil rights laws and [place] laws. We do not discriminate on the basis of race, color, national origin, age, disability, sex, sexual orientation, or gender identity.            Thank you!     Thank you for choosing [facility]  for your  care. Our goal is always to provide you with excellent care. Hearing back from our patients is one way we can continue to improve our services. Please take a few minutes to complete the written survey that you may receive in the mail after your visit with us. Thank you!             Your Updated Medication List - Protect others around you: Learn how to safely use, store and throw away your medicines at www.disposemymeds.org.          This list is accurate as of 6/27/18  8:51 AM.  Always use your most recent med list.                   Brand Name Dispense Instructions for use Diagnosis    albuterol 108 (90 Base) MCG/ACT Inhaler    PROAIR HFA/PROVENTIL HFA/VENTOLIN HFA    1 Inhaler    Inhale 1-2 puffs into the lungs every 6 hours as needed for shortness of breath / dyspnea or wheezing    COPD, mild (H)       doxazosin 4 MG tablet    CARDURA    90 tablet    Take 1 tablet (4 mg) by mouth At Bedtime    Benign prostatic hyperplasia with urinary frequency

## 2018-06-27 NOTE — PROGRESS NOTES
SUBJECTIVE:   CC: Forest Sandoval is an 57 year old male who presents for preventative health visit.     Healthy Habits:    Do you get at least three servings of calcium containing foods daily (dairy, green leafy vegetables, etc.)? yes    Amount of exercise or daily activities, outside of work: 3 day(s) per week    Problems taking medications regularly No    Medication side effects: No    Have you had an eye exam in the past two years? no    Do you see a dentist twice per year? yes    Do you have sleep apnea, excessive snoring or daytime drowsiness?yes daytime drowsiness, wakes up 4 times a night       PROBLEMS TO ADD ON...  Patient informed that anything we discuss that is not related to preventative medicine, may be billed for; patient verbalizes understanding.    Frequent urination    Hx of BPH  Was on Cadura 2mg in the past - no improvements   Getting up to urinate 2-4x per night    Shortness of breath and chest tightness  Feels as though its hard to breath  Happens when he tries to take a deep breath or when running  Denies chest pain    -------------------------------------    Today's PHQ-2 Score:   PHQ-2 ( 1999 [employer]) 6/27/2018 9/24/2014   Q1: Little interest or pleasure in doing things 0 0   Q2: Feeling down, depressed or hopeless 0 1   PHQ-2 Score 0 1       Abuse: Current or Past(Physical, Sexual or Emotional)- No  Do you feel safe in your environment - Yes    Social History   Substance Use Topics     Smoking status: Former Smoker     Smokeless tobacco: Never Used     Alcohol use Yes      If you drink alcohol do you typically have >3 drinks per day or >7 drinks per week? Yes - AUDIT SCORE:  10  AUDIT - Alcohol Use Disorders Identification Test - Reproduced from the World Health Organization Audit 2001 (Second Edition) 6/27/2018   1.  How often do you have a drink containing alcohol? 2 to 3 times a week   2.  How many drinks containing alcohol do you have on a typical day when you are drinking? 3 or 4  "  3.  How often do you have five or more drinks on one occasion? Weekly   4.  How often during the last year have you found that you were not able to stop drinking once you had started? Never   5.  How often during the last year have you failed to do what was normally expected of you because of drinking? Never   6.  How often during the last year have you needed a first drink in the morning to get yourself going after a heavy drinking session? Never   7.  How often during the last year have you had a feeling of guilt or remorse after drinking? Less than monthly   8.  How often during the last year have you been unable to remember what happened the night before because of your drinking? Never   9.  Have you or someone else been injured because of your drinking? Yes, but not in the last year   10. Has a relative, friend, doctor or other health care worker been concerned about your drinking or suggested you cut down? No   TOTAL SCORE 10                         Last PSA:   PSA   Date Value Ref Range Status   09/24/2014 1.25 0 - 4 ug/L Final       Reviewed orders with patient. Reviewed health maintenance and updated orders accordingly - Yes  Labs reviewed in EPIC    Reviewed and updated as needed this visit by clinical staff  Tobacco  Allergies  Meds         Reviewed and updated as needed this visit by Provider        Past Medical History:   Diagnosis Date     NO ACTIVE PROBLEMS         ROS:  Other than what is noted in the HPI and PMH a complete review of systems is otherwise negative including: Constitutional, HEENT, endocrine, cardiovascular, respiratory, GI/, musculoskeletal, neuro, and psychiatric.     OBJECTIVE:   /74  Pulse 52  Temp 97.1  F (36.2  C) (Tympanic)  Resp 16  Ht 5' 11.22\" (1.809 m)  Wt 172 lb (78 kg)  SpO2 96%  BMI 23.84 kg/m2  EXAM:  GENERAL: healthy, alert and no distress  EYES: Eyes grossly normal to inspection, PERRL and conjunctivae and sclerae normal  HENT: ear canals and TM's " "normal, nose and mouth without ulcers or lesions  NECK: no adenopathy, no asymmetry, masses, or scars and thyroid normal to palpation  RESP: lungs clear to auscultation - no rales, rhonchi or wheezes  CV: regular rate and rhythm, normal S1 S2, no S3 or S4, no murmur, click or rub, no peripheral edema and peripheral pulses strong  ABDOMEN: soft, nontender, no hepatosplenomegaly, no masses and bowel sounds normal  MS: no gross musculoskeletal defects noted, no edema  SKIN: no suspicious lesions or rashes  NEURO: Normal strength and tone, mentation intact and speech normal  PSYCH: mentation appears normal, affect normal/bright    ASSESSMENT/PLAN:       ICD-10-CM    1. Encounter for routine adult health examination with abnormal findings Z00.01 Prostate spec antigen screen     Hepatitis C Screen Reflex to HCV RNA Quant and Genotype     GASTROENTEROLOGY ADULT REF PROCEDURE ONLY     OPTOMETRY REFERRAL   2. Hyperlipidemia LDL goal <160 E78.5 Comprehensive metabolic panel     Lipid panel reflex to direct LDL Fasting   3. Benign prostatic hyperplasia with urinary frequency N40.1 UROLOGY ADULT REFERRAL    R35.0 doxazosin (CARDURA) 4 MG tablet   4. COPD, mild (H) J44.9 albuterol (PROAIR HFA/PROVENTIL HFA/VENTOLIN HFA) 108 (90 Base) MCG/ACT Inhaler       Patient's chest symptoms sound most consistent with his previous diagnosis of COPD. Will trial albuterol prior to exercise. If his sxs do not improve he'll follow up with me.     Routine and screening labs today. Screenings discussed.    Will start Cadura 4mg daily for BPH. I recommended he follow up with urology as well. PSA obtained today.      COUNSELING:  Reviewed preventive health counseling, as reflected in patient instructions    BP Readings from Last 1 Encounters:   06/27/18 113/74     Estimated body mass index is 23.84 kg/(m^2) as calculated from the following:    Height as of this encounter: 5' 11.22\" (1.809 m).    Weight as of this encounter: 172 lb (78 kg).       " reports that he has quit smoking. He has never used smokeless tobacco.      Counseling Resources:  ATP IV Guidelines  Pooled Cohorts Equation Calculator  FRAX Risk Assessment  ICSI Preventive Guidelines  Dietary Guidelines for Americans, 2010  USDA's MyPlate  ASA Prophylaxis  Lung CA Screening    Marly Kimball PA-C  [facility]

## 2018-06-27 NOTE — PATIENT INSTRUCTIONS
Preventive Health Recommendations  Male Ages 50 - 64    Yearly exam:             See your health care provider every year in order to  o   Review health changes.   o   Discuss preventive care.    o   Review your medicines if your doctor has prescribed any.     Have a cholesterol test every 5 years, or more frequently if you are at risk for high cholesterol/heart disease.     Have a diabetes test (fasting glucose) every three years. If you are at risk for diabetes, you should have this test more often.     Have a colonoscopy at age 50, or have a yearly FIT test (stool test). These exams will check for colon cancer.      Talk with your health care provider about whether or not a prostate cancer screening test (PSA) is right for you.    You should be tested each year for STDs (sexually transmitted diseases), if you re at risk.     Shots: Get a flu shot each year. Get a tetanus shot every 10 years.     Nutrition:    Eat at least 5 servings of fruits and vegetables daily.     Eat whole-grain bread, whole-wheat pasta and brown rice instead of white grains and rice.     Get adequate Calcium and Vitamin D.     Lifestyle    Exercise for at least 150 minutes a week (30 minutes a day, 5 days a week). This will help you control your weight and prevent disease.     Limit alcohol to one drink per day.     No smoking.     Wear sunscreen to prevent skin cancer.     See your dentist every six months for an exam and cleaning.     See your eye doctor every 1 to 2 years.

## 2018-06-29 LAB
HCV RNA SERPL NAA+PROBE-ACNC: NORMAL [IU]/ML
HCV RNA SERPL NAA+PROBE-LOG IU: NORMAL LOG IU/ML

## 2018-07-02 ENCOUNTER — TELEPHONE (OUTPATIENT)
Dept: FAMILY MEDICINE | Facility: CLINIC | Age: 58
End: 2018-07-02

## 2018-07-02 NOTE — PROGRESS NOTES
Please send the following letter to the patient:    Forest,    Your hepatitis C screen was initially a FALSE positive, the confirmatory test was negative. This can happen, but you DO NOT have hepatitis C.  Electrolytes, kidney function, liver enzymes, and the prostate marker are all normal.  Your blood sugar is normal - no signs of diabetes.  Your LDL (bad) cholesterol is <160 and therefore at goal - your cholesterol has improved.     Please call me with any questions or concerns.          Marly Kimball PA-C

## 2018-07-02 NOTE — TELEPHONE ENCOUNTER
Called patient with follow up - no answer, KATHERINE left.    I'm sending patient's lab results home but wanted to discuss his Hep C screen over the phone:    Your hepatitis C screen was initially a FALSE positive, the confirmatory test was negative. This can happen, but you DO NOT have hepatitis C.

## 2018-07-03 NOTE — TELEPHONE ENCOUNTER
Patient left a message on [employer] voice mail, he can be reached at either his work # [phone] or cell # [phone].

## 2018-07-03 NOTE — TELEPHONE ENCOUNTER
Left message on voice mail for patient to call clinic. [phone]  Mehnaz Howard RN

## 2018-07-03 NOTE — TELEPHONE ENCOUNTER
Left message on voice mail for patient to call clinic. [phone]  Mehnaz Howard RN

## 2018-07-03 NOTE — TELEPHONE ENCOUNTER
Left message on voice mail for patient to call clinic. [phone]  Mehnaz Howard RN

## 2019-10-18 ENCOUNTER — APPOINTMENT (OUTPATIENT)
Dept: GENERAL RADIOLOGY | Facility: CLINIC | Age: 59
End: 2019-10-18
Attending: EMERGENCY MEDICINE
Payer: COMMERCIAL

## 2019-10-18 ENCOUNTER — OFFICE VISIT (OUTPATIENT)
Dept: URGENT CARE | Facility: URGENT CARE | Age: 59
End: 2019-10-18
Payer: COMMERCIAL

## 2019-10-18 ENCOUNTER — HOSPITAL ENCOUNTER (EMERGENCY)
Facility: CLINIC | Age: 59
Discharge: HOME | End: 2019-10-18
Attending: EMERGENCY MEDICINE | Admitting: EMERGENCY MEDICINE
Payer: COMMERCIAL

## 2019-10-18 VITALS
HEIGHT: 72 IN | TEMPERATURE: 97.7 F | RESPIRATION RATE: 16 BRPM | OXYGEN SATURATION: 96 % | SYSTOLIC BLOOD PRESSURE: 142 MMHG | DIASTOLIC BLOOD PRESSURE: 90 MMHG | BODY MASS INDEX: 24.24 KG/M2 | HEART RATE: 66 BPM | WEIGHT: 179 LBS

## 2019-10-18 VITALS
SYSTOLIC BLOOD PRESSURE: 141 MMHG | HEART RATE: 67 BPM | WEIGHT: 179 LBS | DIASTOLIC BLOOD PRESSURE: 87 MMHG | OXYGEN SATURATION: 96 % | HEIGHT: 71 IN | RESPIRATION RATE: 13 BRPM | TEMPERATURE: 97.9 F | BODY MASS INDEX: 25.06 KG/M2

## 2019-10-18 DIAGNOSIS — R07.9 CHEST PAIN, UNSPECIFIED TYPE: ICD-10-CM

## 2019-10-18 DIAGNOSIS — R07.9 CHEST PAIN, UNSPECIFIED TYPE: Primary | ICD-10-CM

## 2019-10-18 LAB
ALBUMIN SERPL-MCNC: 4 G/DL (ref 3.4–5)
ALP SERPL-CCNC: 69 U/L (ref 40–150)
ALT SERPL W P-5'-P-CCNC: 23 U/L (ref 0–70)
ANION GAP SERPL CALCULATED.3IONS-SCNC: 7 MMOL/L (ref 3–14)
AST SERPL W P-5'-P-CCNC: 24 U/L (ref 0–45)
BASOPHILS # BLD AUTO: 0.1 10E9/L (ref 0–0.2)
BASOPHILS NFR BLD AUTO: 0.8 %
BILIRUB SERPL-MCNC: 0.4 MG/DL (ref 0.2–1.3)
BUN SERPL-MCNC: 22 MG/DL (ref 7–30)
CALCIUM SERPL-MCNC: 9.4 MG/DL (ref 8.5–10.1)
CHLORIDE SERPL-SCNC: 106 MMOL/L (ref 94–109)
CO2 SERPL-SCNC: 26 MMOL/L (ref 20–32)
CREAT SERPL-MCNC: 0.98 MG/DL (ref 0.66–1.25)
DIFFERENTIAL METHOD BLD: NORMAL
EOSINOPHIL # BLD AUTO: 0.3 10E9/L (ref 0–0.7)
EOSINOPHIL NFR BLD AUTO: 4.6 %
ERYTHROCYTE [DISTWIDTH] IN BLOOD BY AUTOMATED COUNT: 12.2 % (ref 10–15)
GFR SERPL CREATININE-BSD FRML MDRD: 84 ML/MIN/{1.73_M2}
GLUCOSE SERPL-MCNC: 79 MG/DL (ref 70–99)
HCT VFR BLD AUTO: 42.6 % (ref 40–53)
HGB BLD-MCNC: 14.2 G/DL (ref 13.3–17.7)
IMM GRANULOCYTES # BLD: 0 10E9/L (ref 0–0.4)
IMM GRANULOCYTES NFR BLD: 0 %
LYMPHOCYTES # BLD AUTO: 1.4 10E9/L (ref 0.8–5.3)
LYMPHOCYTES NFR BLD AUTO: 22.7 %
MCH RBC QN AUTO: 30.2 PG (ref 26.5–33)
MCHC RBC AUTO-ENTMCNC: 33.3 G/DL (ref 31.5–36.5)
MCV RBC AUTO: 91 FL (ref 78–100)
MONOCYTES # BLD AUTO: 0.7 10E9/L (ref 0–1.3)
MONOCYTES NFR BLD AUTO: 10.3 %
NEUTROPHILS # BLD AUTO: 3.9 10E9/L (ref 1.6–8.3)
NEUTROPHILS NFR BLD AUTO: 61.6 %
NRBC # BLD AUTO: 0 10*3/UL
NRBC BLD AUTO-RTO: 0 /100
PLATELET # BLD AUTO: 268 10E9/L (ref 150–450)
POTASSIUM SERPL-SCNC: 4 MMOL/L (ref 3.4–5.3)
PROT SERPL-MCNC: 7.9 G/DL (ref 6.8–8.8)
RBC # BLD AUTO: 4.7 10E12/L (ref 4.4–5.9)
SODIUM SERPL-SCNC: 139 MMOL/L (ref 133–144)
TROPONIN I SERPL-MCNC: <0.015 UG/L (ref 0–0.04)
WBC # BLD AUTO: 6.3 10E9/L (ref 4–11)

## 2019-10-18 PROCEDURE — 85025 COMPLETE CBC W/AUTO DIFF WBC: CPT | Performed by: STUDENT IN AN ORGANIZED HEALTH CARE EDUCATION/TRAINING PROGRAM

## 2019-10-18 PROCEDURE — 99285 EMERGENCY DEPT VISIT HI MDM: CPT | Mod: 25

## 2019-10-18 PROCEDURE — 93005 ELECTROCARDIOGRAM TRACING: CPT

## 2019-10-18 PROCEDURE — 99215 OFFICE O/P EST HI 40 MIN: CPT | Performed by: FAMILY MEDICINE

## 2019-10-18 PROCEDURE — 93010 ELECTROCARDIOGRAM REPORT: CPT | Mod: Z6 | Performed by: EMERGENCY MEDICINE

## 2019-10-18 PROCEDURE — 84484 ASSAY OF TROPONIN QUANT: CPT | Performed by: STUDENT IN AN ORGANIZED HEALTH CARE EDUCATION/TRAINING PROGRAM

## 2019-10-18 PROCEDURE — 99285 EMERGENCY DEPT VISIT HI MDM: CPT | Mod: 25 | Performed by: EMERGENCY MEDICINE

## 2019-10-18 PROCEDURE — 80053 COMPREHEN METABOLIC PANEL: CPT | Performed by: STUDENT IN AN ORGANIZED HEALTH CARE EDUCATION/TRAINING PROGRAM

## 2019-10-18 PROCEDURE — 93000 ELECTROCARDIOGRAM COMPLETE: CPT | Performed by: FAMILY MEDICINE

## 2019-10-18 PROCEDURE — 71046 X-RAY EXAM CHEST 2 VIEWS: CPT

## 2019-10-18 ASSESSMENT — ENCOUNTER SYMPTOMS
CHEST TIGHTNESS: 1
PSYCHIATRIC NEGATIVE: 1
EYES NEGATIVE: 1
ALLERGIC/IMMUNOLOGIC NEGATIVE: 1
MUSCULOSKELETAL NEGATIVE: 1
NEUROLOGICAL NEGATIVE: 1
CONSTITUTIONAL NEGATIVE: 1
ENDOCRINE NEGATIVE: 1
HEMATOLOGIC/LYMPHATIC NEGATIVE: 1
GASTROINTESTINAL NEGATIVE: 1

## 2019-10-18 ASSESSMENT — MIFFLIN-ST. JEOR
SCORE: 1654.07
SCORE: 1662

## 2019-10-18 NOTE — ED NOTES
Episodes of sharp cp, with increased frequency. Hx of copd and mild sob, and denies n/v   Left arm with tingly sensation and pain is in left chest.  [facility] with [facility] and was sent here for tropnin

## 2019-10-18 NOTE — PROGRESS NOTES
Subjective     Forest Sandoval is a 59 year old male who presents to clinic today for the following health issues:    HPI   Chief Complaint   Patient presents with     Chest Pain     all day getting sharp pains        Duration: this morning     Description (location/character/radiation): left sided sporadic chest pain, sharp intermittent, left arm tingling     Intensity:  moderate    Accompanying signs and symptoms: mild SOB, no palpitation    History (similar episodes/previous evaluation): ex-smoker     Precipitating or alleviating factors: None    Therapies tried and outcome: none          Patient Active Problem List   Diagnosis     Hyperlipidemia LDL goal <160     Dermatitis     BPH (benign prostatic hyperplasia)     DJD (degenerative joint disease) of knee     COPD, mild (H)     Generalized anxiety disorder     Past Surgical History:   Procedure Laterality Date     COLONOSCOPY N/A 12/28/2015    Procedure: COLONOSCOPY;  Surgeon: Dez Pickard MD;  Location: [facility] OR     COLONOSCOPY WITH CO2 INSUFFLATION N/A 12/28/2015    Procedure: COLONOSCOPY WITH CO2 INSUFFLATION;  Surgeon: Dez Pickard MD;  Location: [facility] OR     NO HISTORY OF SURGERY         Social History     Tobacco Use     Smoking status: Former Smoker     Smokeless tobacco: Never Used   Substance Use Topics     Alcohol use: Yes     Family History   Problem Relation Age of Onset     Myocardial Infarction Mother 66     Abdominal Aortic Aneurysm Mother      Alcohol/Drug Father      Heart Failure Father      Cancer Sister         BCC and Melanoma         Current Outpatient Medications   Medication Sig Dispense Refill     albuterol (PROAIR HFA/PROVENTIL HFA/VENTOLIN HFA) 108 (90 Base) MCG/ACT Inhaler Inhale 1-2 puffs into the lungs every 6 hours as needed for shortness of breath / dyspnea or wheezing (Patient not taking: Reported on 10/18/2019) 1 Inhaler 1     doxazosin (CARDURA) 4 MG tablet TAKE 1 TABLET (4 MG) BY MOUTH AT BEDTIME (Patient not taking:  "Reported on 10/18/2019) 90 tablet 1     Allergies   Allergen Reactions     Contrast Dye Other (See Comments)     LW CM1: >>> NO CONTRAST ADVERSE REACTION <<<     Reaction :     Sulfa Drugs Unknown     Tobramycin Unknown     Recent Labs   Lab Test 06/27/18  0858 09/24/14  0941   * 174*   HDL 66 72   TRIG 82 61   ALT 18  --    CR 1.12  --    GFRESTIMATED 68  --    GFRESTBLACK 82  --    POTASSIUM 4.6  --       BP Readings from Last 3 Encounters:   10/18/19 (!) 142/90   06/27/18 113/74   05/14/17 151/88    Wt Readings from Last 3 Encounters:   10/18/19 81.2 kg (179 lb)   06/27/18 78 kg (172 lb)   05/14/17 79.8 kg (176 lb)                      Reviewed and updated as needed this visit by Provider         Review of Systems   ROS COMP: Constitutional, HEENT, cardiovascular, pulmonary, GI, , musculoskeletal, neuro, skin, endocrine and psych systems are negative, except as otherwise noted.      Objective    BP (!) 142/90   Pulse 66   Temp 97.7  F (36.5  C) (Oral)   Resp 16   Ht 1.816 m (5' 11.5\")   Wt 81.2 kg (179 lb)   SpO2 96%   BMI 24.62 kg/m    Body mass index is 24.62 kg/m .  Physical Exam   GENERAL: alert and no distress  EYES: Eyes grossly normal to inspection, PERRL and conjunctivae and sclerae normal  HENT: normal cephalic/atraumatic, ear canals and TM's normal, nose and mouth without ulcers or lesions, oropharynx clear and oral mucous membranes moist  NECK: no adenopathy, no asymmetry, masses, or scars and thyroid normal to palpation  RESP: lungs clear to auscultation - no rales, rhonchi or wheezes  CV: regular rates and rhythm, normal S1 S2, no S3 or S4 and no murmur, click or rub  ABDOMEN: soft, nontender  MS: no gross musculoskeletal defects noted, no edema  SKIN: no suspicious lesions or rashes    ECG: Sinus rhythm, normal rate, no acute ischemic changes or rhythm abnormality noted      Assessment & Plan     (R07.9) Chest pain, unspecified type  (primary encounter diagnosis)  Comment: " 59-year-old male presents with left-sided chest pain with paresthesia-like symptoms involving left arm, experiencing symptoms since morning intermittent, mild to moderate intensity, associated with some shortness of breath.  Patient denies any nausea, vomiting, palpitation, cough or other relevant systemic symptoms.  Physical examination unremarkable.  ECG showed normal sinus rhythm without any acute ischemic changes or rhythm abnormality.  [id number] discussed in detail including coronary ischemia.  Needs further evaluation to delineate a specific diagnosis.  Suggested to go ER for further evaluation and management.  Patient deferred ER transfer by ambulance.  Patient understood and in agreement with above plan.  All questions answered.         Harjeet Brady MD  [facility]

## 2019-10-18 NOTE — ED AVS SNAPSHOT
[facility] Emergency Department  [address] [address]  Phone:  [phone]  Fax:  [phone]                                    Forest Sandoval   MRN: [id number]    Department:  [facility] Emergency Department   Date of Visit:  10/18/2019           After Visit Summary Signature Page    I have received my discharge instructions, and my questions have been answered. I have discussed any challenges I see with this plan with the nurse or doctor.    ..........................................................................................................................................  Patient/Patient Representative Signature      ..........................................................................................................................................  Patient Representative Print Name and Relationship to Patient    ..................................................               ................................................  Date                                   Time    ..........................................................................................................................................  Reviewed by Signature/Title    ...................................................              ..............................................  Date                                               Time          22EPIC Rev 08/18

## 2019-10-19 NOTE — ED PROVIDER NOTES
"  History     Chief Complaint   Patient presents with     Chest Pain     he has been haivng sharp chest pain on and off for maybe 2 years, this started last noc and has continued through the day on and off, seen in [place] had EKG which was normal,pt drove himself here      HPI  Forest Sandoval is a 59 year old male with a history significant for mild COPD, hyperlipidemia, benign prostatic hyperplasia, and anxiety who presents to the emergency department from clinic for evaluation of chest pain. The patient states that he has experienced non-radiating sharp \"zingers\" of pain in his left chest every 1-2 weeks for the past two years with no correlation with time of day or activity. When the patient woke up this morning, he began experiencing more frequent sharp pains which have continued throughout the day. The patient denies pressure, twisting, or squeezing pain in his chest. He also denies any recent leg swelling, nausea, or vomiting but acknowledges somewhat increased shortness of breath with laying flat. He reports that he typically has difficulty sleeping due to chronic urinary frequency. He adds that he used to take medication for this but did not experience improvement. The patient regularly exercises by biking or running but notes that he has not been exercising as much during the past six months. He participated in no unusual activities yesterday or today. The patient does not take any prescription medications regularly and has drug allergies to sulfa and tobramycin. He has a positive family history of heart disease through his mother, who  at age 66 from a \"heart attack\" after undergoing a quadruple bypass, and his father who was diagnosed with \"coronary heart failure\" at 72 and  a few months later. The patient is a former smoker who quit in . He does not have a primary care provider.     Social History: The patient lives in [place], [place]. He presents alone via private car. He is currently " employed at [employer].      Allergies:  Allergies   Allergen Reactions     Contrast Dye Other (See Comments)     LW CM1: >>> NO CONTRAST ADVERSE REACTION <<<     Reaction :     Sulfa Drugs Unknown     Tobramycin Unknown       Problem List:    Patient Active Problem List    Diagnosis Date Noted     Generalized anxiety disorder 02/25/2015     Priority: Medium     BPH (benign prostatic hyperplasia) 09/25/2014     Priority: Medium     DJD (degenerative joint disease) of knee 09/25/2014     Priority: Medium     COPD, mild (H) 09/25/2014     Priority: Medium     Dermatitis 07/17/2012     Priority: Medium     Hyperlipidemia LDL goal <160 09/16/2010     Priority: Medium        Past Medical History:    Past Medical History:   Diagnosis Date     NO ACTIVE PROBLEMS        Past Surgical History:    Past Surgical History:   Procedure Laterality Date     COLONOSCOPY N/A 12/28/2015    Procedure: COLONOSCOPY;  Surgeon: Dez Pickard MD;  Location: [facility] OR     COLONOSCOPY WITH CO2 INSUFFLATION N/A 12/28/2015    Procedure: COLONOSCOPY WITH CO2 INSUFFLATION;  Surgeon: Dez Pickard MD;  Location: [facility] OR     NO HISTORY OF SURGERY         Family History:    Family History   Problem Relation Age of Onset     Myocardial Infarction Mother 66     Abdominal Aortic Aneurysm Mother      Alcohol/Drug Father      Heart Failure Father      Cancer Sister         BCC and Melanoma       Social History:  Marital Status:   [4]  Social History     Tobacco Use     Smoking status: Former Smoker     Smokeless tobacco: Never Used   Substance Use Topics     Alcohol use: Yes     Drug use: No        Medications:    albuterol (PROAIR HFA/PROVENTIL HFA/VENTOLIN HFA) 108 (90 Base) MCG/ACT Inhaler          Review of Systems   Constitutional: Negative.    HENT: Negative.    Eyes: Negative.    Respiratory: Positive for chest tightness.    Cardiovascular: Positive for chest pain.   Gastrointestinal: Negative.    Endocrine: Negative.    Genitourinary:  "Negative.    Musculoskeletal: Negative.    Skin: Negative.    Allergic/Immunologic: Negative.    Neurological: Negative.    Hematological: Negative.    Psychiatric/Behavioral: Negative.    All other systems reviewed and are negative.      Physical Exam   BP: 137/84  Pulse: 60  Heart Rate: 61  Temp: 97.9  F (36.6  C)  Resp: 16  Height: 180.3 cm (5' 11\")  Weight: 81.2 kg (179 lb)  SpO2: 96 %      Physical Exam  Constitutional:       General: He is not in acute distress.     Appearance: He is normal weight. He is not ill-appearing, toxic-appearing or diaphoretic.   HENT:      Head: Normocephalic and atraumatic.      Nose: Nose normal. No congestion or rhinorrhea.      Mouth/Throat:      Pharynx: No oropharyngeal exudate or posterior oropharyngeal erythema.   Eyes:      General: No scleral icterus.        Right eye: No discharge.         Left eye: No discharge.      Extraocular Movements: Extraocular movements intact.      Conjunctiva/sclera: Conjunctivae normal.      Pupils: Pupils are equal, round, and reactive to light.   Neck:      Musculoskeletal: Normal range of motion. No neck rigidity or muscular tenderness.      Vascular: No carotid bruit.   Cardiovascular:      Rate and Rhythm: Normal rate and regular rhythm.      Pulses: Normal pulses.      Heart sounds: No murmur. No friction rub. No gallop.    Pulmonary:      Effort: No respiratory distress.      Breath sounds: No stridor. No wheezing, rhonchi or rales.   Chest:      Chest wall: No tenderness.   Musculoskeletal:         General: No swelling, tenderness, deformity or signs of injury.      Right lower leg: No edema.      Left lower leg: No edema.   Lymphadenopathy:      Cervical: No cervical adenopathy.   Skin:     Capillary Refill: Capillary refill takes less than 2 seconds.      Coloration: Skin is not jaundiced or pale.      Findings: No bruising, erythema, lesion or rash.   Neurological:      Cranial Nerves: No cranial nerve deficit.      Sensory: No " sensory deficit.      Motor: No weakness.      Coordination: Coordination normal.      Gait: Gait normal.      Deep Tendon Reflexes: Reflexes normal.   Psychiatric:         Mood and Affect: Mood normal.         Behavior: Behavior normal.         Thought Content: Thought content normal.         Judgement: Judgment normal.         ED Course        Procedures               EKG Interpretation:      Interpreted by Aaron Berry MD  Time reviewed: 7.15PM (obtained in clinic at 17:49)  Symptoms at time of EKG: None   Rhythm: normal sinus   Rate: Normal  Axis: Normal  Ectopy: none  Conduction: normal  ST Segments/ T Waves: Non-specific ST-T wave changes  Q Waves: nonspecific  Comparison to prior: No old EKG available. Compared with EKG obtained on arrival in the department.    Clinical Impression: no acute ischemia  }        Critical Care time:  none               ED medications: none      ED Vitals:  Vitals:    10/18/19 1900 10/18/19 1915 10/18/19 1930 10/18/19 1945   BP: (!) 141/90 127/86 134/83 (!) 141/87   Pulse: 60 59 57 67   Resp: 18 11 19 13   Temp:       TempSrc:       SpO2: 94% 95% 95% 96%   Weight:       Height:           ED labs and imaging:  Results for orders placed or performed during the hospital encounter of 10/18/19   Chest XR,  PA & LAT    Narrative    Examination:  XR CHEST 2 VW    Date:  10/18/2019 8:05 PM     Clinical Information: Intermittent left chest pain.     Additional Information: none    Comparison: 9/24/2014    Findings:     Clear lungs, without focal infiltrate, pulmonary edema, or pleural  effusion. No pneumothorax, as queried. Minimal interstitial linear  scarring in the lung parenchyma, unchanged. Normal heart size and  silhouette. Normal bones.      Impression    Impression:    1. No acute cardiopulmonary abnormality. No pneumothorax or other  process, as queried. No change from 2014.    MAUREEN BRUNO MD   CBC with platelets, differential   Result Value Ref Range    WBC 6.3  4.0 - 11.0 10e9/L    RBC Count 4.70 4.4 - 5.9 10e12/L    Hemoglobin 14.2 13.3 - 17.7 g/dL    Hematocrit 42.6 40.0 - 53.0 %    MCV 91 78 - 100 fl    MCH 30.2 26.5 - 33.0 pg    MCHC 33.3 31.5 - 36.5 g/dL    RDW 12.2 10.0 - 15.0 %    Platelet Count 268 150 - 450 10e9/L    Diff Method Automated Method     % Neutrophils 61.6 %    % Lymphocytes 22.7 %    % Monocytes 10.3 %    % Eosinophils 4.6 %    % Basophils 0.8 %    % Immature Granulocytes 0.0 %    Nucleated RBCs 0 0 /100    Absolute Neutrophil 3.9 1.6 - 8.3 10e9/L    Absolute Lymphocytes 1.4 0.8 - 5.3 10e9/L    Absolute Monocytes 0.7 0.0 - 1.3 10e9/L    Absolute Eosinophils 0.3 0.0 - 0.7 10e9/L    Absolute Basophils 0.1 0.0 - 0.2 10e9/L    Abs Immature Granulocytes 0.0 0 - 0.4 10e9/L    Absolute Nucleated RBC 0.0    Comprehensive metabolic panel   Result Value Ref Range    Sodium 139 133 - 144 mmol/L    Potassium 4.0 3.4 - 5.3 mmol/L    Chloride 106 94 - 109 mmol/L    Carbon Dioxide 26 20 - 32 mmol/L    Anion Gap 7 3 - 14 mmol/L    Glucose 79 70 - 99 mg/dL    Urea Nitrogen 22 7 - 30 mg/dL    Creatinine 0.98 0.66 - 1.25 mg/dL    GFR Estimate 84 >60 mL/min/[1.73_m2]    GFR Estimate If Black >90 >60 mL/min/[1.73_m2]    Calcium 9.4 8.5 - 10.1 mg/dL    Bilirubin Total 0.4 0.2 - 1.3 mg/dL    Albumin 4.0 3.4 - 5.0 g/dL    Protein Total 7.9 6.8 - 8.8 g/dL    Alkaline Phosphatase 69 40 - 150 U/L    ALT 23 0 - 70 U/L    AST 24 0 - 45 U/L   Troponin I   Result Value Ref Range    Troponin I ES <0.015 0.000 - 0.045 ug/L               Assessments & Plan (with Medical Decision Making)   Clinical impression: 59-year-old male with multiple medical diagnosis including a history of hyperlipidemia, COPD, generalized anxiety disorder,history of benign prostatic hyperplasia who was referred from the [facility] for further evaluation with intermittent chest discomfort and left arm paresthesias.  Patient arrived alone by private car reporting intermittent sharp chest discomfort  "in the left chest he described as \"like a zinger, lasting seconds\".  Intermittently over the last 1 to 2 years.  Episodes are often about 1 to 2-week apart.  In the last 24 hours he reported his discomfort is been more prominent. \"Zinger\" does not radiate. No personal history of heart disease but reports a family history of heart disease.  Mother had a quadruple bypass in her 50s and  from a massive heart attack in her 60s.  He reported reasonable exercise activity including riding bike, running on treadmill although his exercise in the last 6-8 routine has decreased.  Former smoker quit in .No exertional symptoms. No lower extremity swelling, no cough, no unintentional weight . He reported urinary frequency which he attributes to prostate problems and age, stopped taking \"medication prescribed to help 6 months ago. Did not feel it was working. Maybe dose was too low\" .  On my exam he was reporting he was symptom-free and had no discomfort.  Normal cardiac and lung exam he was afebrile and hemodynamic.  Blood pressure on my examination was 127/86.  He was afebrile in the department. . Because of his discomfort as described is not clear.  Close follow-up advised for recheck and additional consideration for diagnostic testing within 1 week        ED Course and Plan:  I reviewed patient's medical records including his clinic assessment prior to arrival in the department.  I reviewed EKG completed in clinic prior to arrival in the department which did not show any acute ischemia or DeWinters's criteria.   Repeat EKG was requested on arrival in the department-for interval comparison but patient declined as he was asymptomatic on arrival in the department.  We discussed possible causes for his intermittent \", \"Zinger\"sharp\" chest discomfort that did not radiate.  With his reported family history of heart disease patient's age and symptoms intermittently over the last 24hours to obtain chest x-ray. Heart score- is " 2 (age,and non-specific EKG).  Normal troponin in the department, and normal labs.  Chest x-ray is negative for pneumothorax on my independent review no acute process appreciated.  I also reviewed the radiologist interpretation see report above.    We discussed next steps for care with his intermittent symptoms.  He does have a family history although his symptoms as described is a bit atypical for acute coronary syndrome.  He admitted that he currently does not have a primary care provider.  I offered to schedule follow-up appointment in the clinic for the patient's but he elected to schedule the appointment on his own. Patient was encouraged to schedule a clinic appointment next week both to establish primary care and to follow-up for intermittent chest discomfort and consideration for stress testing.  Patient remained asymptomatic during his ED course of care. We also discussed that if his symptoms do change where  pain is persistent and lasting longer than a few seconds radiating or any new concerns including symptoms with exertion or rest he should return to the department for further evaluation and care. Patient expressed comfort and understanding of plan of care.        Disclaimer: This note consists of symbols derived from keyboarding, dictation and/or voice recognition software. As a result, there may be errors in the script that have gone undetected. Please consider this when interpreting information found in this chart.  I have reviewed the nursing notes.    I have reviewed the findings, diagnosis, plan and need for follow up with the patient.       Discharge Medication List as of 10/18/2019  9:13 PM          Final diagnoses:   Chest pain, unspecified type - left sided. Intermittent over the last 1-2 years. increasing frequency over 24 hours.       This document serves as a record of the services and decisions personally performed and made by Aaron Berry. The HPI was created on his behalf by  Do Friedman, a trained medical scribe. The creation of the HPI is based on the provider's statements to the medical scribe.     Do Friedman 7:23 PM October 18, 2019    Provider:   The information in the HPI created by the medical scribe for me, accurately reflects the services I personally performed and the decisions made by me. The documentation of the review of systems, physical exam, and medical decision making is written by Aaron Berry. I have reviewed and approved this document for accuracy prior to leaving the patient care area.   Aaron Berry MD 7:23 PM October 18, 2019    10/18/2019   [facility] EMERGENCY DEPARTMENT     Aaron Berry MD  10/19/19 0050

## 2019-10-19 NOTE — DISCHARGE INSTRUCTIONS
1) The cause of your chest pain as described over the last 1 to 2 years and again more recently in the last 24 hours is not clear. Your evaluation in the department does not suggest that you are having a heart attack, chest x-ray was also negative for acute process including infection or collapsed lung.    2) we have discussed that the cause of your pain is not clear however it is important that you follow-up in the clinic in the next 3 days.  I recommend you call the clinic on Monday, October 21 to schedule follow-up appointment to establish primary care and for next steps for care including a stress test as we discussed.    3) if your symptoms change or develop new symptoms of concern including chest pain that does not resolve within seconds and persist, shortness of breath with rest or activity chest heaviness fainting spell any new concerns he should return to the department for further care

## 2019-11-07 ENCOUNTER — TELEPHONE (OUTPATIENT)
Dept: FAMILY MEDICINE | Facility: CLINIC | Age: 59
End: 2019-11-07

## 2019-11-07 NOTE — TELEPHONE ENCOUNTER
Will  place an order for a stress test ? ED provider didn't place an order for a stress test. Patient has not seen primary Marly K x 1 year and patient would like to avoid another OV if possible.

## 2019-11-07 NOTE — TELEPHONE ENCOUNTER
Reason for Call:  Other call back    Detailed comments: Patient was told by [facility] [place] [facility] doctor to schedule a stress test, patient would like a call to discuss locations that can be done at/help scheduling.    Phone Number Patient can be reached at: Home number on file [phone] (home)    Best Time:     Can we leave a detailed message on this number? YES    Call taken on 11/7/2019 at 3:37 PM by Cassie Vincent

## 2021-06-28 ENCOUNTER — PRE VISIT (OUTPATIENT)
Dept: UROLOGY | Facility: CLINIC | Age: 61
End: 2021-06-28

## 2021-06-28 NOTE — TELEPHONE ENCOUNTER
Reason for visit: Consult     Relevant information: Peyronie's Disease    Records/imaging/labs/orders: in EPIC    Pt called: no    At Rooming: normal

## 2021-07-08 ENCOUNTER — OFFICE VISIT (OUTPATIENT)
Dept: UROLOGY | Facility: CLINIC | Age: 61
End: 2021-07-08
Payer: COMMERCIAL

## 2021-07-08 ENCOUNTER — PRE VISIT (OUTPATIENT)
Dept: UROLOGY | Facility: CLINIC | Age: 61
End: 2021-07-08

## 2021-07-08 VITALS
SYSTOLIC BLOOD PRESSURE: 149 MMHG | HEIGHT: 71 IN | WEIGHT: 190 LBS | DIASTOLIC BLOOD PRESSURE: 99 MMHG | BODY MASS INDEX: 26.6 KG/M2 | HEART RATE: 74 BPM

## 2021-07-08 DIAGNOSIS — N52.9 ERECTILE DYSFUNCTION, UNSPECIFIED ERECTILE DYSFUNCTION TYPE: ICD-10-CM

## 2021-07-08 DIAGNOSIS — N48.6 PEYRONIE'S DISEASE: Primary | ICD-10-CM

## 2021-07-08 PROCEDURE — 99203 OFFICE O/P NEW LOW 30 MIN: CPT | Mod: GC | Performed by: UROLOGY

## 2021-07-08 RX ORDER — SILDENAFIL 100 MG/1
50-100 TABLET, FILM COATED ORAL DAILY PRN
Qty: 24 TABLET | Refills: 11 | Status: SHIPPED | OUTPATIENT
Start: 2021-07-08 | End: 2022-03-08

## 2021-07-08 ASSESSMENT — MIFFLIN-ST. JEOR: SCORE: 1698.96

## 2021-07-08 ASSESSMENT — PAIN SCALES - GENERAL: PAINLEVEL: NO PAIN (0)

## 2021-07-08 NOTE — PATIENT INSTRUCTIONS
Please follow up as needed with Dr. Kidd.    It was a pleasure meeting with you today.  Thank you for allowing me and my team the privilege of caring for you today.  YOU are the reason we are here, and I truly hope we provided you with the excellent service you deserve.  Please let us know if there is anything else we can do for you so that we can be sure you are leaving completely satisfied with your care experience.

## 2021-07-08 NOTE — NURSING NOTE
"Chief Complaint   Patient presents with     Consult     Peyronie's disease       Height 1.803 m (5' 11\"), weight 86.2 kg (190 lb). Body mass index is 26.5 kg/m .    Patient Active Problem List   Diagnosis     Hyperlipidemia LDL goal <160     Dermatitis     BPH (benign prostatic hyperplasia)     DJD (degenerative joint disease) of knee     COPD, mild (H)     Generalized anxiety disorder       Allergies   Allergen Reactions     Contrast Dye Other (See Comments)     LW CM1: >>> NO CONTRAST ADVERSE REACTION <<<     Reaction :     Sulfa Drugs Unknown     Tobramycin Unknown       Current Outpatient Medications   Medication Sig Dispense Refill     albuterol (PROAIR HFA/PROVENTIL HFA/VENTOLIN HFA) 108 (90 Base) MCG/ACT Inhaler Inhale 1-2 puffs into the lungs every 6 hours as needed for shortness of breath / dyspnea or wheezing 1 Inhaler 1       Social History     Tobacco Use     Smoking status: Former Smoker     Smokeless tobacco: Never Used   Substance Use Topics     Alcohol use: Yes     Drug use: No       Cedric Maria EMT  7/8/2021  9:14 AM  "

## 2021-07-08 NOTE — LETTER
7/8/2021       RE: Forest Sandoval  [id number] [address] [address]     Dear Colleague,    Thank you for referring your patient, Forest Sandoval, to the [facility]OLOGY [facility] [place] [facility]. Please see a copy of my visit note below.    I am seeing Forest Sandoval in consultation for evaluation of penile curvature.     HPI:    He states that prior to March 2021, his penis was completely normal but since then has noticed increasing curvature and penile shortening that is worsening. It feels that it is harder, shorter, and much more curved. He notes no history of trauma to the penis. He feels that he may have lost 1/2 to 1/3 the total length of his penis.     Over the course of the last year he has noticed both decreased desire to have sex, and decreased ability to obtain erections. His health status hasn't changed tremendously in the last year although has gained 10 lbs since the COVID pandemic and has a prior history of smoking with mild COPD.    No history of Dupuytren's contractures.    REVIEW OF SYSTEMS:  General: negative  Skin: negative  Eyes: negative  Ears/Nose/Throat: negative  Respiratory: negative  Cardiovascular: negative  Gastrointestinal: negative  Genitourinary: see HPI  Musculoskeletal: negative  Neurologic: negative  Psychiatric: negative  Hematologic/Lymphatic/Immunologic: negative  Endocrine: negative    PAST MEDICAL HX:  Past Medical History:   Diagnosis Date     NO ACTIVE PROBLEMS        PAST SURG HX:  Past Surgical History:   Procedure Laterality Date     COLONOSCOPY N/A 12/28/2015    Procedure: COLONOSCOPY;  Surgeon: Dez Pickard MD;  Location:  OR     COLONOSCOPY WITH CO2 INSUFFLATION N/A 12/28/2015    Procedure: COLONOSCOPY WITH CO2 INSUFFLATION;  Surgeon: Dez Pickard MD;  Location: [facility] OR     NO HISTORY OF SURGERY          FAMILY HX:  Family History   Problem Relation Age of Onset     Myocardial  "Infarction Mother 66     Abdominal Aortic Aneurysm Mother      Alcohol/Drug Father      Heart Failure Father      Cancer Sister         BCC and Melanoma       SOCIAL HX:  Social History     Tobacco Use     Smoking status: Former Smoker     Smokeless tobacco: Never Used   Substance Use Topics     Alcohol use: Yes     Drug use: No     He quit smoking 21 years ago. He drinks beer/wine on several nights per week.     MEDICATIONS:  Current Outpatient Medications   Medication Sig     albuterol (PROAIR HFA/PROVENTIL HFA/VENTOLIN HFA) 108 (90 Base) MCG/ACT Inhaler Inhale 1-2 puffs into the lungs every 6 hours as needed for shortness of breath / dyspnea or wheezing     No current facility-administered medications for this visit.        ALLERGIES:  Contrast dye, Sulfa drugs, and Tobramycin      GENERAL PHYSICAL EXAM:     BP (!) 149/99   Pulse 74   Ht 1.803 m (5' 11\")   Wt 86.2 kg (190 lb)   BMI 26.50 kg/m     Constitutional: No acute distress. Well nourished.   PSYCH: normal mood and affect.  NEURO: normal gait, no focal deficits.   CARDIOPULMONARY: breathing non-labored, pulse regular rate/rhythm, no peripheral edema.  MUSCULOSKELETAL: normal limb proportions, no muscle wasting, no contractures.  SKIN: Normal virilized hair distribution, no lesions, warts or rashes over genitalia, abdomen extremities or face.  HEME/LYMPH: no ecchymosis, no lymphadenopathy in groin or neck, no lymphedema.     EXAM:  Phallus circumcised, meatus adequate. On his penile shaft, he has multiple, hard plaques bilaterally L>R. They are non-tender.   Left testis descended , size is normal , consistency is normal. No intra-testicular masses.   Right testis descended , size is normal , consistency is normal. No intra-testicular masses.   Epididymes present, non-tender, non-enlarged.   Left cord: Vas present. no varicocele noted.  Right cord: Vas present. no varicocele noted.     Imaging/labs:  Lab Results   Component Value Date    CR 0.98 " 10/18/2019    CR 1.12 06/27/2018     Lab Results   Component Value Date    PSA 2.39 06/27/2018    PSA 1.25 09/24/2014         ASSESSMENT:     61yo male with Peyronie's disease, likely active phase.     As he is still felt to be in the active phase of the disease process, we will have to wait to see where he ultimately settles out before treating. At this time optimal management would include NSAIDS as needed for pain, and consider use of Cialis /Viagra  for sexual intercourse to prevent further damage to the penis.     Once in the stable phase of disease, we can further discuss collagenase injections vs other interventions.     PLAN:    Rest, take care not to reinjure penis.    NSAIDs recommended    Avoid intercourse with less than firm erection    Oral sildenafil.  Discussed side effects and how to take.    Follow up in ~6 months for discussion of further management         I saw and examined the patient with the resident today.  I agree with the resident note and plan of care as above.     Mike Kidd MD  Urology Staff     Additional Coding Information:    Problems:  3 -- one acute uncomplicated illness or injury    Data Reviewed  Labs results as noted above    Level of risk:  3 -- low risk (e.g., OTC medication or observation, minor surgery without risks)    Time spent:  30 minutes spent on the date of the encounter doing chart review, history and exam, documentation and further activities per the note

## 2021-07-08 NOTE — PROGRESS NOTES
I am seeing Forest Sandoval in consultation for evaluation of penile curvature.     HPI:    He states that prior to March 2021, his penis was completely normal but since then has noticed increasing curvature and penile shortening that is worsening. It feels that it is harder, shorter, and much more curved. He notes no history of trauma to the penis. He feels that he may have lost 1/2 to 1/3 the total length of his penis.     Over the course of the last year he has noticed both decreased desire to have sex, and decreased ability to obtain erections. His health status hasn't changed tremendously in the last year although has gained 10 lbs since the COVID pandemic and has a prior history of smoking with mild COPD.    No history of Dupuytren's contractures.    REVIEW OF SYSTEMS:  General: negative  Skin: negative  Eyes: negative  Ears/Nose/Throat: negative  Respiratory: negative  Cardiovascular: negative  Gastrointestinal: negative  Genitourinary: see HPI  Musculoskeletal: negative  Neurologic: negative  Psychiatric: negative  Hematologic/Lymphatic/Immunologic: negative  Endocrine: negative    PAST MEDICAL HX:  Past Medical History:   Diagnosis Date     NO ACTIVE PROBLEMS        PAST SURG HX:  Past Surgical History:   Procedure Laterality Date     COLONOSCOPY N/A 12/28/2015    Procedure: COLONOSCOPY;  Surgeon: Dez Pickard MD;  Location: [facility] OR     COLONOSCOPY WITH CO2 INSUFFLATION N/A 12/28/2015    Procedure: COLONOSCOPY WITH CO2 INSUFFLATION;  Surgeon: Dez Pickard MD;  Location: [facility] OR     NO HISTORY OF SURGERY          FAMILY HX:  Family History   Problem Relation Age of Onset     Myocardial Infarction Mother 66     Abdominal Aortic Aneurysm Mother      Alcohol/Drug Father      Heart Failure Father      Cancer Sister         BCC and Melanoma       SOCIAL HX:  Social History     Tobacco Use     Smoking status: Former Smoker     Smokeless tobacco: Never Used   Substance Use Topics     Alcohol use: Yes     Drug  "use: No     He quit smoking 21 years ago. He drinks beer/wine on several nights per week.     MEDICATIONS:  Current Outpatient Medications   Medication Sig     albuterol (PROAIR HFA/PROVENTIL HFA/VENTOLIN HFA) 108 (90 Base) MCG/ACT Inhaler Inhale 1-2 puffs into the lungs every 6 hours as needed for shortness of breath / dyspnea or wheezing     No current facility-administered medications for this visit.        ALLERGIES:  Contrast dye, Sulfa drugs, and Tobramycin      GENERAL PHYSICAL EXAM:     BP (!) 149/99   Pulse 74   Ht 1.803 m (5' 11\")   Wt 86.2 kg (190 lb)   BMI 26.50 kg/m     Constitutional: No acute distress. Well nourished.   PSYCH: normal mood and affect.  NEURO: normal gait, no focal deficits.   CARDIOPULMONARY: breathing non-labored, pulse regular rate/rhythm, no peripheral edema.  MUSCULOSKELETAL: normal limb proportions, no muscle wasting, no contractures.  SKIN: Normal virilized hair distribution, no lesions, warts or rashes over genitalia, abdomen extremities or face.  HEME/LYMPH: no ecchymosis, no lymphadenopathy in groin or neck, no lymphedema.     EXAM:  Phallus circumcised, meatus adequate. On his penile shaft, he has multiple, hard plaques bilaterally L>R. They are non-tender.   Left testis descended , size is normal , consistency is normal. No intra-testicular masses.   Right testis descended , size is normal , consistency is normal. No intra-testicular masses.   Epididymes present, non-tender, non-enlarged.   Left cord: Vas present. no varicocele noted.  Right cord: Vas present. no varicocele noted.     Imaging/labs:  Lab Results   Component Value Date    CR 0.98 10/18/2019    CR 1.12 06/27/2018     Lab Results   Component Value Date    PSA 2.39 06/27/2018    PSA 1.25 09/24/2014         ASSESSMENT:     61yo male with Peyronie's disease, likely active phase.     As he is still felt to be in the active phase of the disease process, we will have to wait to see where he ultimately settles " out before treating. At this time optimal management would include NSAIDS as needed for pain, and consider use of Cialis /Viagra  for sexual intercourse to prevent further damage to the penis.     Once in the stable phase of disease, we can further discuss collagenase injections vs other interventions.     PLAN:    Rest, take care not to reinjure penis.    NSAIDs recommended    Avoid intercourse with less than firm erection    Oral sildenafil.  Discussed side effects and how to take.    Follow up in ~6 months for discussion of further management         I saw and examined the patient with the resident today.  I agree with the resident note and plan of care as above.     Mike Kidd MD  Urology Staff     Additional Coding Information:    Problems:  3 -- one acute uncomplicated illness or injury    Data Reviewed  Labs results as noted above    Level of risk:  3 -- low risk (e.g., OTC medication or observation, minor surgery without risks)    Time spent:  30 minutes spent on the date of the encounter doing chart review, history and exam, documentation and further activities per the note

## 2022-03-08 ENCOUNTER — OFFICE VISIT (OUTPATIENT)
Dept: FAMILY MEDICINE | Facility: CLINIC | Age: 62
End: 2022-03-08
Payer: COMMERCIAL

## 2022-03-08 VITALS
HEIGHT: 71 IN | DIASTOLIC BLOOD PRESSURE: 76 MMHG | WEIGHT: 199 LBS | OXYGEN SATURATION: 98 % | HEART RATE: 67 BPM | TEMPERATURE: 98.8 F | SYSTOLIC BLOOD PRESSURE: 126 MMHG | BODY MASS INDEX: 27.86 KG/M2 | RESPIRATION RATE: 15 BRPM

## 2022-03-08 DIAGNOSIS — Z12.5 SCREENING FOR PROSTATE CANCER: ICD-10-CM

## 2022-03-08 DIAGNOSIS — Z00.00 ROUTINE GENERAL MEDICAL EXAMINATION AT A HEALTH CARE FACILITY: Primary | ICD-10-CM

## 2022-03-08 DIAGNOSIS — E78.5 HYPERLIPIDEMIA LDL GOAL <160: ICD-10-CM

## 2022-03-08 DIAGNOSIS — N48.6 PEYRONIE DISEASE: ICD-10-CM

## 2022-03-08 DIAGNOSIS — Z12.11 SCREEN FOR COLON CANCER: ICD-10-CM

## 2022-03-08 DIAGNOSIS — Z23 NEED FOR VACCINATION: ICD-10-CM

## 2022-03-08 PROCEDURE — 80061 LIPID PANEL: CPT | Performed by: FAMILY MEDICINE

## 2022-03-08 PROCEDURE — G0103 PSA SCREENING: HCPCS | Performed by: FAMILY MEDICINE

## 2022-03-08 PROCEDURE — 36415 COLL VENOUS BLD VENIPUNCTURE: CPT | Performed by: FAMILY MEDICINE

## 2022-03-08 PROCEDURE — 90750 HZV VACC RECOMBINANT IM: CPT | Performed by: FAMILY MEDICINE

## 2022-03-08 PROCEDURE — 99213 OFFICE O/P EST LOW 20 MIN: CPT | Mod: 25 | Performed by: FAMILY MEDICINE

## 2022-03-08 PROCEDURE — 80053 COMPREHEN METABOLIC PANEL: CPT | Performed by: FAMILY MEDICINE

## 2022-03-08 PROCEDURE — 90471 IMMUNIZATION ADMIN: CPT | Performed by: FAMILY MEDICINE

## 2022-03-08 PROCEDURE — 99396 PREV VISIT EST AGE 40-64: CPT | Mod: 25 | Performed by: FAMILY MEDICINE

## 2022-03-08 ASSESSMENT — ENCOUNTER SYMPTOMS
DIARRHEA: 0
SORE THROAT: 0
HEADACHES: 1
FREQUENCY: 1
DIZZINESS: 0
JOINT SWELLING: 0
HEMATURIA: 0
CONSTIPATION: 0
PARESTHESIAS: 0
DYSURIA: 0
PALPITATIONS: 0
HEARTBURN: 1
COUGH: 0
FEVER: 0
ABDOMINAL PAIN: 0
WEAKNESS: 0
HEMATOCHEZIA: 0
NERVOUS/ANXIOUS: 0
NAUSEA: 0
ARTHRALGIAS: 1
SHORTNESS OF BREATH: 0
EYE PAIN: 0
CHILLS: 0
MYALGIAS: 0

## 2022-03-08 ASSESSMENT — PAIN SCALES - GENERAL: PAINLEVEL: NO PAIN (0)

## 2022-03-08 NOTE — PATIENT INSTRUCTIONS
Preventive Health Recommendations  Male Ages 50 - 64    *   To prevent or delay heart disease, I would consider a statin.   *    See urology for a follow up visit.   [facility]   [address]   [address]th Floor   [facility] [address]   Phone: [phone]     *    Time for a colonoscopy. Our office will be contacting you.   *    First shingles shot, second and final booster in 2-6 months.   *    Blood tests today.   *    Yearly check up.     Yearly exam:             See your health care provider every year in order to  o   Review health changes.   o   Discuss preventive care.    o   Review your medicines if your doctor has prescribed any.     Have a cholesterol test every 5 years, or more frequently if you are at risk for high cholesterol/heart disease.     Have a diabetes test (fasting glucose) every three years. If you are at risk for diabetes, you should have this test more often.     Have a colonoscopy at age 50, or have a yearly FIT test (stool test). These exams will check for colon cancer.      Talk with your health care provider about whether or not a prostate cancer screening test (PSA) is right for you.    You should be tested each year for STDs (sexually transmitted diseases), if you re at risk.     Shots: Get a flu shot each year. Get a tetanus shot every 10 years.     Nutrition:    Eat at least 5 servings of fruits and vegetables daily.     Eat whole-grain bread, whole-wheat pasta and brown rice instead of white grains and rice.     Get adequate Calcium and Vitamin D.     Lifestyle    Exercise for at least 150 minutes a week (30 minutes a day, 5 days a week). This will help you control your weight and prevent disease.     Limit alcohol to one drink per day.     No smoking.     Wear sunscreen to prevent skin cancer.     See your dentist every six months for an exam and cleaning.     See your eye doctor every 1 to 2 years.

## 2022-03-08 NOTE — PROGRESS NOTES
SUBJECTIVE:   CC: Forest Sandoval is an 61 year old male who presents for preventative health visit.     Patient has been advised of split billing requirements and indicates understanding: Yes  Healthy Habits:     Getting at least 3 servings of Calcium per day:  Yes    Bi-annual eye exam:  NO    Dental care twice a year:  Yes    Sleep apnea or symptoms of sleep apnea:  Excessive snoring and Sleep apnea    Diet:  Regular (no restrictions)    Frequency of exercise:  2-3 days/week    Duration of exercise:  15-30 minutes    Taking medications regularly:  Yes    Medication side effects:  None    PHQ-2 Total Score: 0    Additional concerns today:  Yes    *Discuss PSA test, Familial hx of heart problems    Today's PHQ-2 Score:   PHQ-2 ( 1999 Pfizer) 3/8/2022   Q1: Little interest or pleasure in doing things 0   Q2: Feeling down, depressed or hopeless 0   PHQ-2 Score 0   PHQ-2 Total Score (12-17 Years)- Positive if 3 or more points; Administer PHQ-A if positive -   Q1: Little interest or pleasure in doing things Not at all   Q2: Feeling down, depressed or hopeless Not at all   PHQ-2 Score 0       Abuse: Current or Past(Physical, Sexual or Emotional)- No  Do you feel safe in your environment? Yes    Have you ever done Advance Care Planning? (For example, a Health Directive, POLST, or a discussion with a medical provider or your loved ones about your wishes): No, advance care planning information given to patient to review.  Patient declined advance care planning discussion at this time.    Social History     Tobacco Use     Smoking status: Former Smoker     Smokeless tobacco: Never Used   Substance Use Topics     Alcohol use: Yes     If you drink alcohol do you typically have >3 drinks per day or >7 drinks per week? Yes      Alcohol Use 3/8/2022   Prescreen: >3 drinks/day or >7 drinks/week? Yes   Prescreen: >3 drinks/day or >7 drinks/week? -   AUDIT SCORE  7     AUDIT - Alcohol Use Disorders Identification Test -  Reproduced from the World Health Organization Audit 2001 (Second Edition) 3/8/2022   1.  How often do you have a drink containing alcohol? 2 to 3 times a week   2.  How many drinks containing alcohol do you have on a typical day when you are drinking? 3 or 4   3.  How often do you have five or more drinks on one occasion? Monthly   4.  How often during the last year have you found that you were not able to stop drinking once you had started? Never   5.  How often during the last year have you failed to do what was normally expected of you because of drinking? Less than monthly   6.  How often during the last year have you needed a first drink in the morning to get yourself going after a heavy drinking session? Never   7.  How often during the last year have you had a feeling of guilt or remorse after drinking? Never   8.  How often during the last year have you been unable to remember what happened the night before because of your drinking? Never   9.  Have you or someone else been injured because of your drinking? No   10. Has a relative, friend, doctor or other health care worker been concerned about your drinking or suggested you cut down? No   TOTAL SCORE 7       Last PSA:   PSA   Date Value Ref Range Status   06/27/2018 2.39 0 - 4 ug/L Final     Comment:     Assay Method:  Chemiluminescence using Siemens Vista analyzer     Prostate Specific Antigen Screen   Date Value Ref Range Status   03/08/2022 5.33 (H) 0.00 - 4.00 ug/L Final       Reviewed orders with patient. Reviewed health maintenance and updated orders accordingly - Yes  BP Readings from Last 3 Encounters:   03/08/22 126/76   07/08/21 (!) 149/99   10/18/19 (!) 141/87    Wt Readings from Last 3 Encounters:   03/08/22 90.3 kg (199 lb)   07/08/21 86.2 kg (190 lb)   10/18/19 81.2 kg (179 lb)         Reviewed and updated as needed this visit by clinical staff   Tobacco  Allergies  Meds   Med Hx  Surg Hx  Fam Hx  Soc Hx     Ally DeShong CMA    Reviewed  "and updated as needed this visit by Provider                     Review of Systems   Constitutional: Negative for chills and fever.   HENT: Negative for congestion, ear pain, hearing loss and sore throat.    Eyes: Positive for visual disturbance. Negative for pain.   Respiratory: Negative for cough and shortness of breath.    Cardiovascular: Negative for chest pain, palpitations and peripheral edema.   Gastrointestinal: Positive for heartburn. Negative for abdominal pain, constipation, diarrhea, hematochezia and nausea.   Genitourinary: Positive for frequency, impotence and urgency. Negative for dysuria, genital sores, hematuria and penile discharge.   Musculoskeletal: Positive for arthralgias. Negative for joint swelling and myalgias.   Skin: Negative for rash.   Neurological: Positive for headaches. Negative for dizziness, weakness and paresthesias.   Psychiatric/Behavioral: Negative for mood changes. The patient is not nervous/anxious.          OBJECTIVE:   /76   Pulse 67   Temp 98.8  F (37.1  C) (Tympanic)   Resp 15   Ht 1.803 m (5' 11\")   Wt 90.3 kg (199 lb)   SpO2 98%   BMI 27.75 kg/m      Physical Exam  GENERAL: Healthy, alert and no distress  EYES: Eyes grossly normal to inspection, conjunctivae and sclerae normal  RESP: Lungs clear to auscultation - no rales, rhonchi or wheezes  CV: Regular rate and rhythm, normal S1 S2, no murmur  MS: No gross musculoskeletal defects noted, no edema  NEURO: Normal strength and tone, mentation intact and speech normal  PSYCH: Mentation appears normal, affect normal/bright     Diagnostic Test Results:  Labs reviewed in Epic  Results for orders placed or performed in visit on 03/08/22   Lipid panel reflex to direct LDL Non-fasting     Status: Abnormal   Result Value Ref Range    Cholesterol 259 (H) <200 mg/dL    Triglycerides 356 (H) <150 mg/dL    Direct Measure HDL 53 >=40 mg/dL    LDL Cholesterol Calculated 135 (H) <=100 mg/dL    Non HDL Cholesterol 206 (H) " <130 mg/dL    Patient Fasting > 8hrs? No     Narrative    Cholesterol  Desirable:  <200 mg/dL    Triglycerides  Normal:  Less than 150 mg/dL  Borderline High:  150-199 mg/dL  High:  200-499 mg/dL  Very High:  Greater than or equal to 500 mg/dL    Direct Measure HDL  Female:  Greater than or equal to 50 mg/dL   Male:  Greater than or equal to 40 mg/dL    LDL Cholesterol  Desirable:  <100mg/dL  Above Desirable:  100-129 mg/dL   Borderline High:  130-159 mg/dL   High:  160-189 mg/dL   Very High:  >= 190 mg/dL    Non HDL Cholesterol  Desirable:  130 mg/dL  Above Desirable:  130-159 mg/dL  Borderline High:  160-189 mg/dL  High:  190-219 mg/dL  Very High:  Greater than or equal to 220 mg/dL   PSA, screen     Status: Abnormal   Result Value Ref Range    Prostate Specific Antigen Screen 5.33 (H) 0.00 - 4.00 ug/L   Comprehensive metabolic panel (BMP + Alb, Alk Phos, ALT, AST, Total. Bili, TP)     Status: Normal   Result Value Ref Range    Sodium 137 133 - 144 mmol/L    Potassium 5.1 3.4 - 5.3 mmol/L    Chloride 104 94 - 109 mmol/L    Carbon Dioxide (CO2) 28 20 - 32 mmol/L    Anion Gap 5 3 - 14 mmol/L    Urea Nitrogen 23 7 - 30 mg/dL    Creatinine 1.02 0.66 - 1.25 mg/dL    Calcium 9.1 8.5 - 10.1 mg/dL    Glucose 86 70 - 99 mg/dL    Alkaline Phosphatase 76 40 - 150 U/L    AST 25 0 - 45 U/L    ALT 28 0 - 70 U/L    Protein Total 7.6 6.8 - 8.8 g/dL    Albumin 3.7 3.4 - 5.0 g/dL    Bilirubin Total 0.4 0.2 - 1.3 mg/dL    GFR Estimate 84 >60 mL/min/1.73m2       ASSESSMENT/PLAN:   (Z00.00) Routine general medical examination at a health care facility  (primary encounter diagnosis)  Comment: Reviewed the need for.  Healthcare exams and screenings.  Plan: Advise yearly physical.    (Z12.11) Screen for colon cancer  Plan: Adult Gastro Ref - Procedure Only      (E78.5) Hyperlipidemia LDL goal <160  Comment: tolerating moderate intensity statin well.   Plan: Lipid panel reflex to direct LDL Non-fasting,         Comprehensive metabolic  "panel (BMP + Alb, Alk         Phos, ALT, AST, Total. Bili, TP)      (Z23) Need for vaccination  Plan: ZOSTER VACCINE RECOMBINANT (Shingrix)      (Z12.5) Screening for prostate cancer  Plan: PSA, screen          (N48.6) Peyronie disease  Comment: will refer.   Plan: Adult Urology Referral          Patient Instructions       Preventive Health Recommendations  Male Ages 50 - 64    *   To prevent or delay heart disease, I would consider a statin.   *    See urology for a follow up visit.   [facility]   [address] [place] [address]   [address] [facility] [address]   Phone: [phone]     *    Time for a colonoscopy. Our office will be contacting you.   *    First shingles shot, second and final booster in 2-6 months.   *    Blood tests today.   *    Yearly check up.         Patient has been advised of split billing requirements and indicates understanding: Yes    COUNSELING:   Reviewed preventive health counseling, as reflected in patient instructions       Regular exercise       Healthy diet/nutrition       Vision screening       Colorectal cancer screening       Prostate cancer screening    Estimated body mass index is 27.75 kg/m  as calculated from the following:    Height as of this encounter: 1.803 m (5' 11\").    Weight as of this encounter: 90.3 kg (199 lb).     Weight management plan: Discussed healthy diet and exercise guidelines    He reports that he has quit smoking. He has never used smokeless tobacco.      Counseling Resources:  ATP IV Guidelines  Pooled Cohorts Equation Calculator  FRAX Risk Assessment  ICSI Preventive Guidelines  Dietary Guidelines for Americans, 2010  USDA's MyPlate  ASA Prophylaxis  Lung CA Screening    Karen Madison MD  [facility]  "

## 2022-03-09 LAB
ALBUMIN SERPL-MCNC: 3.7 G/DL (ref 3.4–5)
ALP SERPL-CCNC: 76 U/L (ref 40–150)
ALT SERPL W P-5'-P-CCNC: 28 U/L (ref 0–70)
ANION GAP SERPL CALCULATED.3IONS-SCNC: 5 MMOL/L (ref 3–14)
AST SERPL W P-5'-P-CCNC: 25 U/L (ref 0–45)
BILIRUB SERPL-MCNC: 0.4 MG/DL (ref 0.2–1.3)
BUN SERPL-MCNC: 23 MG/DL (ref 7–30)
CALCIUM SERPL-MCNC: 9.1 MG/DL (ref 8.5–10.1)
CHLORIDE BLD-SCNC: 104 MMOL/L (ref 94–109)
CHOLEST SERPL-MCNC: 259 MG/DL
CO2 SERPL-SCNC: 28 MMOL/L (ref 20–32)
CREAT SERPL-MCNC: 1.02 MG/DL (ref 0.66–1.25)
FASTING STATUS PATIENT QL REPORTED: NO
GFR SERPL CREATININE-BSD FRML MDRD: 84 ML/MIN/1.73M2
GLUCOSE BLD-MCNC: 86 MG/DL (ref 70–99)
HDLC SERPL-MCNC: 53 MG/DL
LDLC SERPL CALC-MCNC: 135 MG/DL
NONHDLC SERPL-MCNC: 206 MG/DL
POTASSIUM BLD-SCNC: 5.1 MMOL/L (ref 3.4–5.3)
PROT SERPL-MCNC: 7.6 G/DL (ref 6.8–8.8)
PSA SERPL-MCNC: 5.33 UG/L (ref 0–4)
SODIUM SERPL-SCNC: 137 MMOL/L (ref 133–144)
TRIGL SERPL-MCNC: 356 MG/DL

## 2022-04-13 ENCOUNTER — PRE VISIT (OUTPATIENT)
Dept: UROLOGY | Facility: CLINIC | Age: 62
End: 2022-04-13
Payer: COMMERCIAL

## 2022-04-13 NOTE — TELEPHONE ENCOUNTER
Reason for visit: Follow up     Relevant information: Peyronie's disease    Records/imaging/labs/orders: in EPIC    Pt called: no    At Rooming: normal

## 2022-04-14 ENCOUNTER — LAB (OUTPATIENT)
Dept: LAB | Facility: CLINIC | Age: 62
End: 2022-04-14
Payer: COMMERCIAL

## 2022-04-14 DIAGNOSIS — Z11.4 SCREENING FOR HIV (HUMAN IMMUNODEFICIENCY VIRUS): Primary | ICD-10-CM

## 2022-04-14 DIAGNOSIS — R97.20 ELEVATED PROSTATE SPECIFIC ANTIGEN (PSA): ICD-10-CM

## 2022-04-14 DIAGNOSIS — E78.5 HYPERLIPIDEMIA LDL GOAL <130: ICD-10-CM

## 2022-04-14 LAB
ALT SERPL W P-5'-P-CCNC: 33 U/L (ref 0–70)
AST SERPL W P-5'-P-CCNC: 29 U/L (ref 0–45)
CHOLEST SERPL-MCNC: 174 MG/DL
FASTING STATUS PATIENT QL REPORTED: YES
HDLC SERPL-MCNC: 59 MG/DL
HIV 1+2 AB+HIV1 P24 AG SERPL QL IA: NONREACTIVE
LDLC SERPL CALC-MCNC: 90 MG/DL
NONHDLC SERPL-MCNC: 115 MG/DL
TRIGL SERPL-MCNC: 124 MG/DL

## 2022-04-14 PROCEDURE — 87389 HIV-1 AG W/HIV-1&-2 AB AG IA: CPT

## 2022-04-14 PROCEDURE — 80061 LIPID PANEL: CPT

## 2022-04-14 PROCEDURE — 84460 ALANINE AMINO (ALT) (SGPT): CPT

## 2022-04-14 PROCEDURE — 99000 SPECIMEN HANDLING OFFICE-LAB: CPT

## 2022-04-14 PROCEDURE — 84153 ASSAY OF PSA TOTAL: CPT | Mod: 90

## 2022-04-14 PROCEDURE — 84154 ASSAY OF PSA FREE: CPT | Mod: 90

## 2022-04-14 PROCEDURE — 84450 TRANSFERASE (AST) (SGOT): CPT

## 2022-04-14 PROCEDURE — 36415 COLL VENOUS BLD VENIPUNCTURE: CPT

## 2022-04-15 LAB
PSA FREE MFR SERPL: 8 %
PSA FREE SERPL-MCNC: 0.3 NG/ML
PSA SERPL IA-MCNC: 4 NG/ML

## 2022-04-30 ENCOUNTER — HEALTH MAINTENANCE LETTER (OUTPATIENT)
Age: 62
End: 2022-04-30

## 2022-06-04 DIAGNOSIS — E78.5 HYPERLIPIDEMIA LDL GOAL <130: ICD-10-CM

## 2022-06-06 RX ORDER — ROSUVASTATIN CALCIUM 10 MG/1
TABLET, COATED ORAL
Qty: 90 TABLET | Refills: 2 | Status: SHIPPED | OUTPATIENT
Start: 2022-06-06 | End: 2023-03-09

## 2022-08-10 NOTE — TELEPHONE ENCOUNTER
MEDICAL RECORDS REQUEST   [facility]  [address] [place] [address]  PHONE: [phone]  Fax: [phone]        FUTURE VISIT INFORMATION                                                   Forest Sandoval, : 1960 scheduled for future visit at [facility]    APPOINTMENT INFORMATION:    Date: 21    Provider:  Bernabe    Reason for Visit/Diagnosis: Benign prostatic hyperplasia with urinary frequency    REFERRAL INFORMATION:    Referring provider:  Rehana    Specialty: Family Medicine    Referring providers clinic:  [facility]      RECORDS REQUESTED FOR VISIT                                                     NOTES  STATUS/DETAILS   OFFICE NOTE from referring provider  in process   OFFICE NOTE from other specialist  no   DISCHARGE SUMMARY from hospital  no   DISCHARGE REPORT from the ER  no   OPERATIVE REPORT  no   MEDICATION LIST  yes   LABS     URINALYSIS (UA)  no   URINE CYTOLOGY  no      Outpatient Wound Clinician Visit Note    Chief Complaint:   Chief Complaint   Patient presents with   • Wound   • Office Visit     The below orders were released and performed during the visit:   Orders Placed This Encounter   • Complete Wound Care     Offload right heel at all times. Avoid long periods of standing and walking. Reposition every hour in chair and bed. Use pressure relieving devices and chair and bed.     Order Specific Question:   Diagnosis     Answer:   Diabetic ulcer     Order Specific Question:   Dressing change(s) to be done by     Answer:   Wound Care Team     Order Specific Question:   Dressing change(s) to be done by     Answer:   Other (specify)     Order Specific Question:   Dressing change(s) to be done by     Answer:   Staff Nurse     Order Specific Question:   Other (specify)     Answer:   Rehab facility     Order Specific Question:   Dressing frequency     Answer:   Three times/week (specify)     Order Specific Question:   Three times/week     Answer:   Monday     Order Specific Question:   Three times/week     Answer:   Wednesday     Order Specific Question:   Three times/week     Answer:   Friday     Order Specific Question:   Dressing change(s) to be done using     Answer:   Clean Technique     Order Specific Question:   Clean wound with     Answer:   Normal saline     Order Specific Question:   Topical agents     Answer:   Apligraf     Order Specific Question:   Topical agents     Answer:   Lidocaine 4%     Order Specific Question:   Topical agents     Answer:   Mastisol     Order Specific Question:   Dressing type     Answer:   Gauze (multiple)     Order Specific Question:   Dressing type     Answer:   ABD dressing     Order Specific Question:   Dressing type     Answer:   Gauze roll-plain     Order Specific Question:   Dressing type     Answer:   Oil emulsion (e.g. Adaptic)     Order Specific Question:   Dressing type     Answer:   Other (specify)     Order Specific Question:    Other (specify)     Answer:   steri-strips     Order Specific Question:   Cover dressing     Answer:   Tape-fabric (Medipore)     Order Specific Question:   Specify order of application:     Answer:   apligraf to Wound bed, adaptic, mastisol, steri-strips, gauze, abd, kerlix, tape. Leave apligraf, adaptic and steri-strips in place. Change secondary dressings only on Monday and Friday.     Home Care Service:  No  Type of Supervision: Patient seen by provider  Was care transitioned to this department today? No   Was care transitioned from this department today?  No Hospitalization within the last 30 days (if yes, date of discharge)? No   Arrival Disposition: Wheelchair  Transfer Assist: 2 person  Special Needs: Special Needs List: no  Additional POCT Services Provided: Hand-held doppler  Lower extremity assessment  Departure Instruction: Patient Process: Complex  Comments:  Patient arrival information, vital signs and dressing removed by staff member noted.  Staff obtained consents, records, test results or processed orders.  Patient and Caregiver education was given regarding procedures/therapy planned.  Fall Risk screening performed.  Patient identified to be at a risk for a fall and extra precautionary measures have been taken to ensure this patient's safety.  Pedal pulses were able to palpated on this patient.  Departure Disposition: Depart with assistance and Transfer to another facility or nursing home      None     Spoke with Chava from [facility] regarding POC. Educated on diet, offloading, glucose control, infection control and dressing changes to promote healing. Verbalized understanding of education provided.

## 2022-08-18 ENCOUNTER — ANCILLARY PROCEDURE (OUTPATIENT)
Dept: GENERAL RADIOLOGY | Facility: CLINIC | Age: 62
End: 2022-08-18
Attending: FAMILY MEDICINE
Payer: COMMERCIAL

## 2022-08-18 ENCOUNTER — OFFICE VISIT (OUTPATIENT)
Dept: FAMILY MEDICINE | Facility: CLINIC | Age: 62
End: 2022-08-18

## 2022-08-18 VITALS
HEART RATE: 73 BPM | WEIGHT: 200 LBS | HEIGHT: 71 IN | DIASTOLIC BLOOD PRESSURE: 86 MMHG | TEMPERATURE: 97.6 F | BODY MASS INDEX: 28 KG/M2 | OXYGEN SATURATION: 95 % | SYSTOLIC BLOOD PRESSURE: 133 MMHG | RESPIRATION RATE: 18 BRPM

## 2022-08-18 DIAGNOSIS — M79.674 PAIN OF RIGHT GREAT TOE: ICD-10-CM

## 2022-08-18 DIAGNOSIS — Z23 NEED FOR VACCINATION: ICD-10-CM

## 2022-08-18 DIAGNOSIS — R39.9 LOWER URINARY TRACT SYMPTOMS (LUTS): ICD-10-CM

## 2022-08-18 DIAGNOSIS — S93.401A SPRAIN OF RIGHT ANKLE, UNSPECIFIED LIGAMENT, INITIAL ENCOUNTER: ICD-10-CM

## 2022-08-18 DIAGNOSIS — Z12.11 SCREEN FOR COLON CANCER: ICD-10-CM

## 2022-08-18 DIAGNOSIS — K42.9 UMBILICAL HERNIA WITHOUT OBSTRUCTION AND WITHOUT GANGRENE: Primary | ICD-10-CM

## 2022-08-18 PROCEDURE — 90750 HZV VACC RECOMBINANT IM: CPT | Performed by: FAMILY MEDICINE

## 2022-08-18 PROCEDURE — 99213 OFFICE O/P EST LOW 20 MIN: CPT | Mod: 25 | Performed by: FAMILY MEDICINE

## 2022-08-18 PROCEDURE — 73660 X-RAY EXAM OF TOE(S): CPT | Mod: TC | Performed by: RADIOLOGY

## 2022-08-18 PROCEDURE — 90471 IMMUNIZATION ADMIN: CPT | Performed by: FAMILY MEDICINE

## 2022-08-18 RX ORDER — TAMSULOSIN HYDROCHLORIDE 0.4 MG/1
0.4 CAPSULE ORAL DAILY
Status: CANCELLED | OUTPATIENT
Start: 2022-08-18

## 2022-08-18 RX ORDER — TAMSULOSIN HYDROCHLORIDE 0.4 MG/1
0.4 CAPSULE ORAL DAILY
Qty: 90 CAPSULE | Refills: 1 | Status: SHIPPED | OUTPATIENT
Start: 2022-08-18 | End: 2023-02-13

## 2022-08-18 ASSESSMENT — PAIN SCALES - GENERAL: PAINLEVEL: NO PAIN (0)

## 2022-08-18 NOTE — PROGRESS NOTES
Assessment & Plan     (K42.9) Umbilical hernia without obstruction and without gangrene  (primary encounter diagnosis)  Comment: Reviewed etiology, currently asymptomatic.  Plan: Monitor, if persistently painful, advised surgical repair.            (S93.401A) Sprain of right ankle, unspecified ligament, initial encounter  Comment: Lateral ligament tenderness, will refer to physical therapy for balance training and strengthening.  Plan: Physical Therapy Referral            (O40.938) Pain of right great toe  Comment: Nondisplaced fracture noted, will manage conservatively.  Plan: XR Toe Right G/E 2 Views        Advise flat shoe, activity as tolerated, no strenuous activity for 8 weeks.    (Z12.11) Screen for colon cancer  Plan: Colonscopy Screening  Referral       (R39.9) Lower urinary tract symptoms (LUTS)  Comment: We will treat symptomatically.  Plan: tamsulosin (FLOMAX) 0.4 MG capsule        After.    (Z23) Need for vaccination  Comment: SHINGRIX [[id number]]      Patient Instructions      Xray of your big toe.        Your have an umbilical hernia. Nothing needs to be done as long as it's not persistently painful. The only treatment is surgery.        For the night time urination, try Flomax. If there's no improvement in one month, take (2) capsules daily.        There is another medication for an enlarged prostate called finasteride if needed.         Continue on the statin.         For the ankle, I'd recommend physical therapy to rehab the ankle and prevent reoccurrence. If you have not heard from the scheduling office within 2 business days, please call [phone] for [facility]       For the colonoscopy, our office will be contacting you.       Return in about 6 months (around 2/18/2023) for Routine Visit.    Karen Madison MD  [facility] DINO Dinh is a 62 year old, presenting for the following health issues:  Hernia, Musculoskeletal Problem (Right leg big  "toe), and Imm/Inj      History of Present Illness       Reason for visit:  Possible Umbilical hernia  Symptom onset:  More than a month  Symptoms include:  Abdominal discomfort  Symptom intensity:  Mild  Symptom progression:  Improving  Had these symptoms before:  No    He eats 2-3 servings of fruits and vegetables daily.He consumes 0 sweetened beverage(s) daily.He exercises with enough effort to increase his heart rate 9 or less minutes per day.  He exercises with enough effort to increase his heart rate 3 or less days per week.   He is taking medications regularly.       Review of Systems   CONSTITUTIONAL: NEGATIVE for fever, chills, change in weight  ENT/MOUTH: NEGATIVE for ear, mouth and throat problems  RESP: NEGATIVE for significant cough or SOB  CV: NEGATIVE for chest pain, palpitations or peripheral edema  GI: NEGATIVE for nausea, abdominal pain, heartburn, or change in bowel habits.  Bulge noted by his bellybutton.  MUSCULOSKELETAL: Right ankle pain, right great toe pain.      Objective    /86   Pulse 73   Temp 97.6  F (36.4  C) (Tympanic)   Resp 18   Ht 1.803 m (5' 11\")   Wt 90.7 kg (200 lb)   SpO2 95%   BMI 27.89 kg/m    Body mass index is 27.89 kg/m .  Physical Exam     GENERAL: Healthy, alert and no distress  EYES: Eyes grossly normal to inspection, conjunctivae and sclerae normal  RESP: Lungs clear to auscultation - no rales, rhonchi or wheezes  CV: Regular rate and rhythm, normal S1 S2, no murmur  GI:  soft, nontender, no HSM reducible mass noted at the umbilicus.  MS: Right ankle shows mild edema, especially the lateral aspect, collateral ligaments are mildly tender to palpation.  Right great toe shows ecchymoses and tender to palpation at the MTP joint.  NEURO: Normal strength and tone, mentation intact and speech normal  PSYCH: Mentation appears normal, affect normal/bright       X-ray results show a nondisplaced fracture of the proximal phalanx, right great toe.      Karen Madison MD "             .  ..

## 2022-08-18 NOTE — NURSING NOTE
Prior to immunization administration, verified patients identity using patient s name and date of birth. Please see Immunization Activity for additional information.     Screening Questionnaire for Adult Immunization    Are you sick today?   No   Do you have allergies to medications, food, a vaccine component or latex?   No   Have you ever had a serious reaction after receiving a vaccination?   No   Do you have a long-term health problem with heart, lung, kidney, or metabolic disease (e.g., diabetes), asthma, a blood disorder, no spleen, complement component deficiency, a cochlear implant, or a spinal fluid leak?  Are you on long-term aspirin therapy?   No   Do you have cancer, leukemia, HIV/AIDS, or any other immune system problem?   No   Do you have a parent, brother, or sister with an immune system problem?   No   In the past 3 months, have you taken medications that affect  your immune system, such as prednisone, other steroids, or anticancer drugs; drugs for the treatment of rheumatoid arthritis, Crohn s disease, or psoriasis; or have you had radiation treatments?   No   Have you had a seizure, or a brain or other nervous system problem?   No   During the past year, have you received a transfusion of blood or blood    products, or been given immune (gamma) globulin or antiviral drug?   No   For women: Are you pregnant or is there a chance you could become       pregnant during the next month?   No   Have you received any vaccinations in the past 4 weeks?   No     Immunization questionnaire answers were all negative.        Per orders of Dr. Madison, injection of Shingrix given by Mayuri Fontenot MA. Patient instructed to remain in clinic for 15 minutes afterwards, and to report any adverse reaction to me immediately.       Screening performed by Mayuri Fontenot MA on 8/18/2022 at 5:04 PM.

## 2022-08-18 NOTE — PATIENT INSTRUCTIONS
Xray of your big toe.      Your have an umbilical hernia. Nothing needs to be done as long as it's not persistently painful. The only treatment is surgery.      For the night time urination, try Flomax. If there's no improvement in one month, take (2) capsules daily.      There is another medication for an enlarged prostate called finasteride if needed.       Continue on the statin.       For the ankle, I'd recommend physical therapy to rehab the ankle and prevent reoccurrence. If you have not heard from the scheduling office within 2 business days, please call [phone] for  [facility] [place]     For the colonoscopy, our office will be contacting you.

## 2022-11-08 ENCOUNTER — TELEPHONE (OUTPATIENT)
Dept: FAMILY MEDICINE | Facility: CLINIC | Age: 62
End: 2022-11-08

## 2022-11-08 NOTE — TELEPHONE ENCOUNTER
Screening Questions  BLUE  KIND OF PREP RED  LOCATION [review exclusion criteria] GREEN  SEDATION TYPE        Yes Are you active on mychart?       [place] Ordering/Referring Provider?        [facility] What type of coverage do you have?      No Have you had a positive covid test in the last 90 days?     No 1. BMI  [BMI 40+ - review exclusion criteria]    Yes  2. Are you able to give consent for your medical care? [IF NO,RN REVIEW]        No  3. Are you taking any prescription pain medications on a routine schedule?      NA  3a. EXTENDED PREP What kind of prescription?     No 4. Do you have any chemical dependencies such as alcohol, street drugs, or methadone?    No 5. Do you have any history of post-traumatic stress syndrome, severe anxiety or history of psychosis?      **If yes 3- 5 , please schedule with MAC sedation.**          IF YES TO ANY 6 - 10 - HOSPITAL SETTING ONLY.     No 6.   Do you need assistance transferring?     No 7.   Have you had a heart or lung transplant?    No 8.   Are you currently on dialysis?   No 9.   Do you use daily home oxygen?   no 10. Do you take nitroglycerin?   10a. NA If yes, how often?     11. [FEMALES]  No Are you currently pregnant?    11a. NA If yes, how many weeks? [ Greater than 12 weeks, OR NEEDED]    No 12. Do you have Pulmonary Hypertension? *NEED PAC APPT AT [facility]*     No 13. [review exclusion criteria]  Do you have any implantable devices in your body (pacemaker, defib, LVAD)?    No 14. In the past 6 months, have you had any heart related issues including cardiomyopathy or heart attack?     14a. NA If yes, did it require cardiac stenting if so when?     No 15. Have you had a stroke or Transient ischemic attack (TIA - aka  mini stroke ) within 6 months?      No 16. Do you have mod to severe Obstructive Sleep Apnea?  [Hospital only - Ok at [place]]    No 17. Do you have SEVERE AND UNCONTROLLED asthma? *NEED PAC APPT AT [facility]*     No 18. Are you currently taking  "any blood thinners?     18a. If yes, inform patient to \"follow up w/ ordering provider for bridging instructions.\"    No 19. Do you take the medication Phentermine?    19a. If yes, \"Hold for 7 days before procedure.  Please consult your prescribing provider if you have questions about holding this medication.\"     No  20. Do you have chronic kidney disease?      No  21. Do you have a diagnosis of diabetes?     No  22. On a regular basis do you go 3-5 days between bowel movements?     [facility] 23. Preferred LOCAL Pharmacy for Pre Prescription    [ LIST ONLY ONE PHARMACY]     [facility] [id number] [facility], [place] - [address]        - CLOSING REMINDERS -    Informed patient they will need an adult    Cannot take any type of public or medical transportation alone    Conscious Sedation- Needs  for 6 hours after the procedure       MAC/General-Needs  for 24 hours after procedure    Pre-Procedure Covid test to be completed [[facility] PCR Testing Required]    Confirmed Nurse will call to complete assessment       - SCHEDULING DETAILS -     Jesus  Surgeon    2-3-23   Date of Procedure  Lower Endoscopy [Colonoscopy]  Type of Procedure Scheduled   [facility]-If you answer yes to questions #8, #20, #21Which Colonoscopy Prep was Sent?     GEN Sedation Type     NA PAC / Pre-op Required         Additional comments:  NA          "

## 2022-11-19 ENCOUNTER — HEALTH MAINTENANCE LETTER (OUTPATIENT)
Age: 62
End: 2022-11-19

## 2023-01-27 RX ORDER — BISACODYL 5 MG
TABLET, DELAYED RELEASE (ENTERIC COATED) ORAL
Qty: 4 TABLET | Refills: 0 | Status: SHIPPED | OUTPATIENT
Start: 2023-01-27 | End: 2023-02-03

## 2023-02-01 ENCOUNTER — ANESTHESIA EVENT (OUTPATIENT)
Dept: GASTROENTEROLOGY | Facility: CLINIC | Age: 63
End: 2023-02-01
Payer: COMMERCIAL

## 2023-02-01 ASSESSMENT — LIFESTYLE VARIABLES: TOBACCO_USE: 1

## 2023-02-01 NOTE — ANESTHESIA PREPROCEDURE EVALUATION
Anesthesia Pre-Procedure Evaluation    Patient: Forest Sandoval   MRN: [id number] : 1960        Procedure : Procedure(s):  COLONOSCOPY          Past Medical History:   Diagnosis Date     NO ACTIVE PROBLEMS       Past Surgical History:   Procedure Laterality Date     COLONOSCOPY N/A 2015    Procedure: COLONOSCOPY;  Surgeon: Dez Pickard MD;  Location: [facility] OR     COLONOSCOPY WITH CO2 INSUFFLATION N/A 2015    Procedure: COLONOSCOPY WITH CO2 INSUFFLATION;  Surgeon: Dez Pickard MD;  Location: [facility] OR     NO HISTORY OF SURGERY        Allergies   Allergen Reactions     Contrast Dye Other (See Comments)     LW CM1: >>> NO CONTRAST ADVERSE REACTION <<<     Reaction :     Sulfa Drugs Unknown     Tobramycin Unknown      Social History     Tobacco Use     Smoking status: Former     Smokeless tobacco: Never   Substance Use Topics     Alcohol use: Yes      Wt Readings from Last 1 Encounters:   22 90.7 kg (200 lb)        Anesthesia Evaluation   Pt has had prior anesthetic. Type: MAC.        ROS/MED HX  ENT/Pulmonary:     (+) tobacco use, Past use,     Neurologic:  - neg neurologic ROS     Cardiovascular:     (+) Dyslipidemia -----    METS/Exercise Tolerance:     Hematologic:  - neg hematologic  ROS     Musculoskeletal:   (+) arthritis,     GI/Hepatic:  - neg GI/hepatic ROS     Renal/Genitourinary:     (+) BPH,     Endo:       Psychiatric/Substance Use:     (+) psychiatric history anxiety     Infectious Disease:  - neg infectious disease ROS     Malignancy:  - neg malignancy ROS     Other:  - neg other ROS          Physical Exam    Airway        Mallampati: I   TM distance: > 3 FB   Neck ROM: full   Mouth opening: > 3 cm    Respiratory Devices and Support         Dental       (+) Completely normal teeth      Cardiovascular   cardiovascular exam normal          Pulmonary   pulmonary exam normal                OUTSIDE LABS:  CBC:   Lab Results   Component Value Date    WBC 6.3 10/18/2019    HGB 14.2  10/18/2019    HCT 42.6 10/18/2019     10/18/2019     BMP:   Lab Results   Component Value Date     03/08/2022     10/18/2019    POTASSIUM 5.1 03/08/2022    POTASSIUM 4.0 10/18/2019    CHLORIDE 104 03/08/2022    CHLORIDE 106 10/18/2019    CO2 28 03/08/2022    CO2 26 10/18/2019    BUN 23 03/08/2022    BUN 22 10/18/2019    CR 1.02 03/08/2022    CR 0.98 10/18/2019    GLC 86 03/08/2022    GLC 79 10/18/2019     COAGS: No results found for: PTT, INR, FIBR  POC: No results found for: BGM, HCG, HCGS  HEPATIC:   Lab Results   Component Value Date    ALBUMIN 3.7 03/08/2022    PROTTOTAL 7.6 03/08/2022    ALT 33 04/14/2022    AST 29 04/14/2022    ALKPHOS 76 03/08/2022    BILITOTAL 0.4 03/08/2022     OTHER:   Lab Results   Component Value Date    KIKA 9.1 03/08/2022       Anesthesia Plan    ASA Status:  2      Anesthesia Type: General.   Induction: Propofol.   Maintenance: TIVA.        Consents    Anesthesia Plan(s) and associated risks, benefits, and realistic alternatives discussed. Questions answered and patient/representative(s) expressed understanding.     - Discussed: Risks, Benefits and Alternatives for BOTH SEDATION and the PROCEDURE were discussed     - Discussed with:  Patient         Postoperative Care    Pain management: IV analgesics, Oral pain medications, Multi-modal analgesia.   PONV prophylaxis: Ondansetron (or other 5HT-3), Dexamethasone or Solumedrol     Comments:                DYLAN Phelan CRNA

## 2023-02-03 ENCOUNTER — HOSPITAL ENCOUNTER (OUTPATIENT)
Facility: CLINIC | Age: 63
Discharge: HOME | End: 2023-02-03
Attending: SURGERY | Admitting: SURGERY
Payer: COMMERCIAL

## 2023-02-03 ENCOUNTER — ANESTHESIA (OUTPATIENT)
Dept: GASTROENTEROLOGY | Facility: CLINIC | Age: 63
End: 2023-02-03
Payer: COMMERCIAL

## 2023-02-03 VITALS
OXYGEN SATURATION: 99 % | HEIGHT: 71 IN | TEMPERATURE: 97.9 F | SYSTOLIC BLOOD PRESSURE: 104 MMHG | DIASTOLIC BLOOD PRESSURE: 84 MMHG | HEART RATE: 52 BPM | WEIGHT: 200 LBS | RESPIRATION RATE: 16 BRPM | BODY MASS INDEX: 28 KG/M2

## 2023-02-03 DIAGNOSIS — Z12.11 SPECIAL SCREENING FOR MALIGNANT NEOPLASMS, COLON: Primary | ICD-10-CM

## 2023-02-03 LAB — COLONOSCOPY: NORMAL

## 2023-02-03 PROCEDURE — G0105 COLORECTAL SCRN; HI RISK IND: HCPCS | Performed by: SURGERY

## 2023-02-03 PROCEDURE — 258N000003 HC RX IP 258 OP 636: Performed by: SURGERY

## 2023-02-03 PROCEDURE — 250N000009 HC RX 250: Performed by: SURGERY

## 2023-02-03 PROCEDURE — 250N000011 HC RX IP 250 OP 636: Performed by: NURSE ANESTHETIST, CERTIFIED REGISTERED

## 2023-02-03 PROCEDURE — 45378 DIAGNOSTIC COLONOSCOPY: CPT | Performed by: SURGERY

## 2023-02-03 PROCEDURE — 370N000017 HC ANESTHESIA TECHNICAL FEE, PER MIN: Performed by: SURGERY

## 2023-02-03 RX ORDER — ONDANSETRON 2 MG/ML
4 INJECTION INTRAMUSCULAR; INTRAVENOUS
Status: DISCONTINUED | OUTPATIENT
Start: 2023-02-03 | End: 2023-02-03 | Stop reason: HOSPADM

## 2023-02-03 RX ORDER — ALBUTEROL SULFATE 0.83 MG/ML
2.5 SOLUTION RESPIRATORY (INHALATION) EVERY 4 HOURS PRN
Status: DISCONTINUED | OUTPATIENT
Start: 2023-02-03 | End: 2023-02-03 | Stop reason: HOSPADM

## 2023-02-03 RX ORDER — SODIUM CHLORIDE, SODIUM LACTATE, POTASSIUM CHLORIDE, CALCIUM CHLORIDE 600; 310; 30; 20 MG/100ML; MG/100ML; MG/100ML; MG/100ML
INJECTION, SOLUTION INTRAVENOUS CONTINUOUS
Status: DISCONTINUED | OUTPATIENT
Start: 2023-02-03 | End: 2023-02-03 | Stop reason: HOSPADM

## 2023-02-03 RX ORDER — PROPOFOL 10 MG/ML
INJECTION, EMULSION INTRAVENOUS PRN
Status: DISCONTINUED | OUTPATIENT
Start: 2023-02-03 | End: 2023-02-03

## 2023-02-03 RX ORDER — ONDANSETRON 2 MG/ML
4 INJECTION INTRAMUSCULAR; INTRAVENOUS EVERY 30 MIN PRN
Status: DISCONTINUED | OUTPATIENT
Start: 2023-02-03 | End: 2023-02-03 | Stop reason: HOSPADM

## 2023-02-03 RX ORDER — NALOXONE HYDROCHLORIDE 0.4 MG/ML
0.2 INJECTION, SOLUTION INTRAMUSCULAR; INTRAVENOUS; SUBCUTANEOUS
Status: DISCONTINUED | OUTPATIENT
Start: 2023-02-03 | End: 2023-02-03 | Stop reason: HOSPADM

## 2023-02-03 RX ORDER — FLUMAZENIL 0.1 MG/ML
0.2 INJECTION, SOLUTION INTRAVENOUS
Status: DISCONTINUED | OUTPATIENT
Start: 2023-02-03 | End: 2023-02-03 | Stop reason: HOSPADM

## 2023-02-03 RX ORDER — ONDANSETRON 4 MG/1
4 TABLET, ORALLY DISINTEGRATING ORAL EVERY 30 MIN PRN
Status: DISCONTINUED | OUTPATIENT
Start: 2023-02-03 | End: 2023-02-03 | Stop reason: HOSPADM

## 2023-02-03 RX ORDER — NALOXONE HYDROCHLORIDE 0.4 MG/ML
0.4 INJECTION, SOLUTION INTRAMUSCULAR; INTRAVENOUS; SUBCUTANEOUS
Status: DISCONTINUED | OUTPATIENT
Start: 2023-02-03 | End: 2023-02-03 | Stop reason: HOSPADM

## 2023-02-03 RX ORDER — LIDOCAINE 40 MG/G
CREAM TOPICAL
Status: DISCONTINUED | OUTPATIENT
Start: 2023-02-03 | End: 2023-02-03 | Stop reason: HOSPADM

## 2023-02-03 RX ADMIN — LIDOCAINE HYDROCHLORIDE 0.2 ML: 10 INJECTION, SOLUTION EPIDURAL; INFILTRATION; INTRACAUDAL; PERINEURAL at 09:05

## 2023-02-03 RX ADMIN — PROPOFOL 200 MG: 10 INJECTION, EMULSION INTRAVENOUS at 09:32

## 2023-02-03 RX ADMIN — SODIUM CHLORIDE, POTASSIUM CHLORIDE, SODIUM LACTATE AND CALCIUM CHLORIDE: 600; 310; 30; 20 INJECTION, SOLUTION INTRAVENOUS at 09:06

## 2023-02-03 RX ADMIN — PROPOFOL 100 MG: 10 INJECTION, EMULSION INTRAVENOUS at 09:40

## 2023-02-03 RX ADMIN — PROPOFOL 100 MG: 10 INJECTION, EMULSION INTRAVENOUS at 09:35

## 2023-02-03 ASSESSMENT — ACTIVITIES OF DAILY LIVING (ADL): ADLS_ACUITY_SCORE: 35

## 2023-02-03 NOTE — ANESTHESIA CARE TRANSFER NOTE
Patient: Forest Sandoval    Procedure: Procedure(s):  COLONOSCOPY       Diagnosis: Screen for colon cancer [Z12.11]  Diagnosis Additional Information: No value filed.    Anesthesia Type:   General     Note:    Oropharynx: oropharynx clear of all foreign objects and spontaneously breathing  Level of Consciousness: awake  Oxygen Supplementation: room air    Independent Airway: airway patency satisfactory and stable  Dentition: dentition unchanged  Vital Signs Stable: post-procedure vital signs reviewed and stable  Report to RN Given: handoff report given  Patient transferred to: Phase II    Handoff Report: Identifed the Patient, Identified the Reponsible Provider, Reviewed the pertinent medical history, Discussed the surgical course, Reviewed Intra-OP anesthesia mangement and issues during anesthesia, Set expectations for post-procedure period and Allowed opportunity for questions and acknowledgement of understanding      Vitals:  Vitals Value Taken Time   BP     Temp     Pulse     Resp     SpO2         Electronically Signed By: DYLAN Ferrari CRNA  February 3, 2023  9:53 AM

## 2023-02-03 NOTE — ANESTHESIA POSTPROCEDURE EVALUATION
Patient: Forest Sandoval    Procedure: Procedure(s):  COLONOSCOPY       Anesthesia Type:  General    Note:  Disposition: Outpatient   Postop Pain Control: Uneventful            Sign Out: Well controlled pain   PONV: No   Neuro/Psych: Uneventful            Sign Out: Acceptable/Baseline neuro status   Airway/Respiratory: Uneventful            Sign Out: Acceptable/Baseline resp. status   CV/Hemodynamics: Uneventful            Sign Out: Acceptable CV status; No obvious hypovolemia; No obvious fluid overload   Other NRE: NONE   DID A NON-ROUTINE EVENT OCCUR? No           Last vitals:  Vitals:    02/03/23 0824   BP: (!) 124/91   Pulse: 66   Resp: 16   Temp: 36.8  C (98.2  F)   SpO2: 95%       Electronically Signed By: DYLAN Ferrari CRNA  February 3, 2023  9:53 AM

## 2023-02-03 NOTE — H&P
62 year old year old male here for colonoscopy for personal history of polyps.  Last colonoscopy was 2015.  Patient denies any changes in stool caliber or blood in stool. Patient denies family history of colon cancer.    Patient Active Problem List   Diagnosis     Hyperlipidemia LDL goal <160     Dermatitis     BPH (benign prostatic hyperplasia)     DJD (degenerative joint disease) of knee     Generalized anxiety disorder       Past Medical History:   Diagnosis Date     NO ACTIVE PROBLEMS        Past Surgical History:   Procedure Laterality Date     COLONOSCOPY N/A 12/28/2015    Procedure: COLONOSCOPY;  Surgeon: Dez Pickard MD;  Location: [facility] OR     COLONOSCOPY WITH CO2 INSUFFLATION N/A 12/28/2015    Procedure: COLONOSCOPY WITH CO2 INSUFFLATION;  Surgeon: Dez Pickard MD;  Location: [facility] OR     NO HISTORY OF SURGERY         Family History   Problem Relation Age of Onset     Myocardial Infarction Mother 66     Abdominal Aortic Aneurysm Mother      Heart Disease Mother      Alcohol/Drug Father      Heart Failure Father      Heart Disease Father      Cancer Sister         BCC and Melanoma     Myocardial Infarction Sister        Current Outpatient Rx   Medication Sig Dispense Refill     bisacodyl (DULCOLAX) 5 MG EC tablet Take 2 tablets at 3 pm the day before your procedure. If your procedure is before 11 am, take 2 additional tablets at 11 pm. If your procedure is after 11 am, take 2 additional tablets at 6 am. For additional instructions refer to your colonoscopy prep instructions. 4 tablet 0     polyethylene glycol (GOLYTELY) 236 g suspension The night before the exam at 6 pm drink an 8-ounce glass every 15 minutes until the jug is half empty. If you arrive before 11 AM: Drink the other half of the Golytely jug at 11 PM night before procedure. If you arrive after 11 AM: Drink the other half of the Golytely jug at 6 AM day of procedure. For additional instructions refer to your colonoscopy prep instructions.  "4000 mL 0       Allergies   Allergen Reactions     Contrast Dye Other (See Comments)     LW CM1: >>> NO CONTRAST ADVERSE REACTION <<<     Reaction :     Sulfa Drugs Unknown     Tobramycin Unknown       Pt reports that he has quit smoking. He has never used smokeless tobacco. He reports current alcohol use. He reports that he does not use drugs.    Exam:  BP (!) 124/91 (BP Location: Left arm)   Pulse 66   Temp 98.2  F (36.8  C) (Oral)   Resp 16   Ht 1.803 m (5' 11\")   Wt 90.7 kg (200 lb)   SpO2 95%   BMI 27.89 kg/m      Awake, Alert OX3  Lungs - CTA bilaterally  CV - RRR, no murmurs, distal pulses intact  Abd - soft, non-distended, non-tender, +BS  Extr - No cyanosis or edema    A/P 62 year old year old male in need of colonoscopy for personal history of polyps. Risks, benefits, alternatives, and complications were discussed including the possibility of perforation, bleeding, missed lesion and the patient agreed to proceed.    Raghav Lee, DO on 2/3/2023 at 9:00 AM      "

## 2023-02-13 DIAGNOSIS — R39.9 LOWER URINARY TRACT SYMPTOMS (LUTS): ICD-10-CM

## 2023-02-13 RX ORDER — TAMSULOSIN HYDROCHLORIDE 0.4 MG/1
0.4 CAPSULE ORAL DAILY
Qty: 30 CAPSULE | Refills: 5 | Status: SHIPPED | OUTPATIENT
Start: 2023-02-13 | End: 2023-11-24

## 2023-04-15 ENCOUNTER — HEALTH MAINTENANCE LETTER (OUTPATIENT)
Age: 63
End: 2023-04-15

## 2023-05-14 DIAGNOSIS — E78.5 HYPERLIPIDEMIA LDL GOAL <130: ICD-10-CM

## 2023-05-15 RX ORDER — ROSUVASTATIN CALCIUM 10 MG/1
10 TABLET, COATED ORAL DAILY
Qty: 30 TABLET | Refills: 2 | Status: SHIPPED | OUTPATIENT
Start: 2023-05-15 | End: 2023-09-26

## 2023-09-26 ENCOUNTER — MYC MEDICAL ADVICE (OUTPATIENT)
Dept: FAMILY MEDICINE | Facility: CLINIC | Age: 63
End: 2023-09-26
Payer: COMMERCIAL

## 2023-09-26 DIAGNOSIS — E78.5 HYPERLIPIDEMIA LDL GOAL <130: ICD-10-CM

## 2023-09-26 NOTE — TELEPHONE ENCOUNTER
See patient's mychart request for rosuvastatin.    He is scheduled to see aMrly Kimball in December.    LDL Cholesterol Calculated   Date Value Ref Range Status   04/14/2022 90 <=100 mg/dL Final   06/27/2018 136 (H) <100 mg/dL Final     Comment:     Above desirable:  100-129 mg/dl  Borderline High:  130-159 mg/dL  High:             160-189 mg/dL  Very high:       >189 mg/dl           I rere'd refill request and pharmacy, routed to Dr. Madison to address additional refills to get to December visit.    Kallie BORGES RN  [facility] Triage

## 2023-09-27 RX ORDER — ROSUVASTATIN CALCIUM 10 MG/1
10 TABLET, COATED ORAL DAILY
Qty: 90 TABLET | Refills: 0 | Status: SHIPPED | OUTPATIENT
Start: 2023-09-27 | End: 2023-11-24

## 2023-11-24 ENCOUNTER — OFFICE VISIT (OUTPATIENT)
Dept: FAMILY MEDICINE | Facility: CLINIC | Age: 63
End: 2023-11-24
Payer: COMMERCIAL

## 2023-11-24 VITALS
TEMPERATURE: 97.2 F | WEIGHT: 201.6 LBS | BODY MASS INDEX: 28.22 KG/M2 | SYSTOLIC BLOOD PRESSURE: 134 MMHG | HEART RATE: 74 BPM | OXYGEN SATURATION: 94 % | DIASTOLIC BLOOD PRESSURE: 76 MMHG | RESPIRATION RATE: 18 BRPM | HEIGHT: 71 IN

## 2023-11-24 DIAGNOSIS — Z87.891 FORMER SMOKER: ICD-10-CM

## 2023-11-24 DIAGNOSIS — Z82.49 FAMILY HISTORY OF ISCHEMIC HEART DISEASE: ICD-10-CM

## 2023-11-24 DIAGNOSIS — Z00.00 ROUTINE GENERAL MEDICAL EXAMINATION AT A HEALTH CARE FACILITY: ICD-10-CM

## 2023-11-24 DIAGNOSIS — E78.5 HYPERLIPIDEMIA LDL GOAL <130: ICD-10-CM

## 2023-11-24 DIAGNOSIS — Z13.1 SCREENING FOR DIABETES MELLITUS: ICD-10-CM

## 2023-11-24 DIAGNOSIS — Z12.2 SCREENING FOR LUNG CANCER: ICD-10-CM

## 2023-11-24 DIAGNOSIS — K42.9 UMBILICAL HERNIA WITHOUT OBSTRUCTION AND WITHOUT GANGRENE: Primary | ICD-10-CM

## 2023-11-24 DIAGNOSIS — Z12.5 SCREENING FOR PROSTATE CANCER: ICD-10-CM

## 2023-11-24 DIAGNOSIS — Z13.29 SCREENING FOR THYROID DISORDER: ICD-10-CM

## 2023-11-24 LAB
CHOLEST SERPL-MCNC: 198 MG/DL
FASTING STATUS PATIENT QL REPORTED: YES
GLUCOSE SERPL-MCNC: 103 MG/DL (ref 70–99)
HDLC SERPL-MCNC: 66 MG/DL
LDLC SERPL CALC-MCNC: 110 MG/DL
NONHDLC SERPL-MCNC: 132 MG/DL
PSA SERPL DL<=0.01 NG/ML-MCNC: 6.78 NG/ML (ref 0–4.5)
TRIGL SERPL-MCNC: 110 MG/DL
TSH SERPL DL<=0.005 MIU/L-ACNC: 3.11 UIU/ML (ref 0.3–4.2)

## 2023-11-24 PROCEDURE — G0103 PSA SCREENING: HCPCS | Performed by: NURSE PRACTITIONER

## 2023-11-24 PROCEDURE — 99396 PREV VISIT EST AGE 40-64: CPT | Performed by: NURSE PRACTITIONER

## 2023-11-24 PROCEDURE — 82947 ASSAY GLUCOSE BLOOD QUANT: CPT | Performed by: NURSE PRACTITIONER

## 2023-11-24 PROCEDURE — 80061 LIPID PANEL: CPT | Performed by: NURSE PRACTITIONER

## 2023-11-24 PROCEDURE — 84443 ASSAY THYROID STIM HORMONE: CPT | Performed by: NURSE PRACTITIONER

## 2023-11-24 PROCEDURE — 36415 COLL VENOUS BLD VENIPUNCTURE: CPT | Performed by: NURSE PRACTITIONER

## 2023-11-24 PROCEDURE — 99213 OFFICE O/P EST LOW 20 MIN: CPT | Mod: 25 | Performed by: NURSE PRACTITIONER

## 2023-11-24 RX ORDER — ROSUVASTATIN CALCIUM 10 MG/1
10 TABLET, COATED ORAL DAILY
Qty: 90 TABLET | Refills: 3 | Status: SHIPPED | OUTPATIENT
Start: 2023-11-24

## 2023-11-24 ASSESSMENT — ENCOUNTER SYMPTOMS
HEMATURIA: 0
COUGH: 0
CONSTIPATION: 0
DYSURIA: 0
EYE PAIN: 0
HEARTBURN: 1
MYALGIAS: 0
NERVOUS/ANXIOUS: 0
NAUSEA: 0
CHILLS: 0
SORE THROAT: 0
FREQUENCY: 1
HEMATOCHEZIA: 0
JOINT SWELLING: 0
FEVER: 0
HEADACHES: 0
ABDOMINAL PAIN: 0
DIARRHEA: 0
DIZZINESS: 0
WEAKNESS: 0
ARTHRALGIAS: 0
PALPITATIONS: 0
PARESTHESIAS: 0

## 2023-11-24 ASSESSMENT — PAIN SCALES - GENERAL: PAINLEVEL: NO PAIN (0)

## 2023-11-24 NOTE — PROGRESS NOTES
SUBJECTIVE:   Fabrizio is a 63 year old, presenting for the following:  Physical        11/24/2023    11:14 AM   Additional Questions   Roomed by Kia   Accompanied by self       Healthy Habits:     Getting at least 3 servings of Calcium per day:  Yes    Bi-annual eye exam:  NO    Dental care twice a year:  Yes    Sleep apnea or symptoms of sleep apnea:  Daytime drowsiness and Excessive snoring    Diet:  Regular (no restrictions)    Frequency of exercise:  1 day/week    Duration of exercise:  15-30 minutes    Taking medications regularly:  Yes    Medication side effects:  None    Additional concerns today:  Yes    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  In his free time he enjoys camping, boating, biking, hiking.  He used to exercise on the treadmill at least 3 days/week he has gotten out of that routine since COVID.  He is working on reestablishing that exercise routine.  He reports he has a family history of heart disease in his parents and siblings in their 60s.  He is tolerating his cholesterol medication.  Discussed lifestyle factors contributing to heart disease over age 60.  Daily exercise and tips on after visit summary will prevent that as well as taking his cholesterol medicine.  He denies symptoms of chest pain or shortness of breath.    He was taking Flomax for overactive nocturnal enuresis; he did not feel it helped much.  He does try to limit fluids near bedtime.  He has trouble falling back asleep after being awake.  Discussed tips that might help as well as tips given on after visit summary.  We will check prostate labs today if elevated will refer to urology for evaluation.  Discussed other interventions if needed.  He would like referral for general surgery for an ongoing umbilical hernia.  He reports it pops out regularly discomfort pushing it back again.  Denies issues with bowel movements.  Discussed if severe pain or getting larger  he needs to be evaluated.  Will refer to general surgery and tips given on after visit summary.  Discussed lung cancer screening he quit smoking 3 years ago and smoked for 25 years.  Advised him to verify coverage prior to scheduling.  He reports his mental health is well he does have some anxiety but he feels that he is managing it okay.  He is in agreement to screening labs, RSV vaccine declined    Today's PHQ-2 Score:       11/24/2023    10:49 AM   PHQ-2 ( 1999 [employer])   Q1: Little interest or pleasure in doing things 0   Q2: Feeling down, depressed or hopeless 0   PHQ-2 Score 0   Q1: Little interest or pleasure in doing things Not at all   Q2: Feeling down, depressed or hopeless Not at all   PHQ-2 Score 0         Social History     Tobacco Use    Smoking status: Former     Passive exposure: Past    Smokeless tobacco: Never   Substance Use Topics    Alcohol use: Yes             11/24/2023    10:49 AM   Alcohol Use   Prescreen: >3 drinks/day or >7 drinks/week? No         3/8/2022     3:10 PM   AUDIT - Alcohol Use Disorders Identification Test - Reproduced from the World Health Organization Audit 2001 (Second Edition)   1.  How often do you have a drink containing alcohol? 2 to 3 times a week   2.  How many drinks containing alcohol do you have on a typical day when you are drinking? 3 or 4   3.  How often do you have five or more drinks on one occasion? Monthly   4.  How often during the last year have you found that you were not able to stop drinking once you had started? Never   5.  How often during the last year have you failed to do what was normally expected of you because of drinking? Less than monthly   6.  How often during the last year have you needed a first drink in the morning to get yourself going after a heavy drinking session? Never   7.  How often during the last year have you had a feeling of guilt or remorse after drinking? Never   8.  How often during the last year have you been unable to  remember what happened the night before because of your drinking? Never   9.  Have you or someone else been injured because of your drinking? No   10. Has a relative, friend, doctor or other health care worker been concerned about your drinking or suggested you cut down? No   TOTAL SCORE 7       Last PSA:   PSA   Date Value Ref Range Status   06/27/2018 2.39 0 - 4 ug/L Final     Comment:     Assay Method:  Chemiluminescence using Siemens Vista analyzer     Prostate Specific Antigen Screen   Date Value Ref Range Status   03/08/2022 5.33 (H) 0.00 - 4.00 ug/L Final       Reviewed orders with patient. Reviewed health maintenance and updated orders accordingly - Yes  Lab work is in process  Labs reviewed in EPIC  BP Readings from Last 3 Encounters:   11/24/23 134/76   02/03/23 104/84   08/18/22 133/86    Wt Readings from Last 3 Encounters:   11/24/23 91.4 kg (201 lb 9.6 oz)   02/03/23 90.7 kg (200 lb)   08/18/22 90.7 kg (200 lb)                  Patient Active Problem List   Diagnosis    Hyperlipidemia LDL goal <130    Dermatitis    BPH (benign prostatic hyperplasia)    DJD (degenerative joint disease) of knee    Generalized anxiety disorder    Family history of ischemic heart disease    Former smoker    Umbilical hernia without obstruction and without gangrene     Past Surgical History:   Procedure Laterality Date    COLONOSCOPY N/A 12/28/2015    Procedure: COLONOSCOPY;  Surgeon: Dez Pickard MD;  Location:  OR    COLONOSCOPY N/A 2/3/2023    Procedure: COLONOSCOPY;  Surgeon: Raghav Lee DO;  Location: [facility]    COLONOSCOPY WITH CO2 INSUFFLATION N/A 12/28/2015    Procedure: COLONOSCOPY WITH CO2 INSUFFLATION;  Surgeon: Dez Pickard MD;  Location:  OR    NO HISTORY OF SURGERY         Social History     Tobacco Use    Smoking status: Former     Passive exposure: Past    Smokeless tobacco: Never   Substance Use Topics    Alcohol use: Yes     Family History   Problem Relation Age of Onset    Myocardial  Infarction Mother 66    Abdominal Aortic Aneurysm Mother     Heart Disease Mother     Alcohol/Drug Father     Heart Failure Father     Heart Disease Father     Cancer Sister         BCC and Melanoma    Myocardial Infarction Sister          Current Outpatient Medications   Medication Sig Dispense Refill    rosuvastatin (CRESTOR) 10 MG tablet Take 1 tablet (10 mg) by mouth daily 90 tablet 3     Allergies   Allergen Reactions    Contrast Dye Other (See Comments)     LW CM1: >>> NO CONTRAST ADVERSE REACTION <<<     Reaction :    Sulfa Antibiotics Unknown    Tobramycin Unknown     Recent Labs   Lab Test 04/14/22  0827 03/08/22  1717 10/18/19  1903 06/27/18  0858   LDL 90 135*  --  136*   HDL 59 53  --  66   TRIG 124 356*  --  82   ALT 33 28 23 18   CR  --  1.02 0.98 1.12   GFRESTIMATED  --  84 84 68   GFRESTBLACK  --   --  >90 82   POTASSIUM  --  5.1 4.0 4.6        Reviewed and updated as needed this visit by clinical staff   Tobacco  Allergies  Meds              Reviewed and updated as needed this visit by Provider                 Past Medical History:   Diagnosis Date    NO ACTIVE PROBLEMS       Past Surgical History:   Procedure Laterality Date    COLONOSCOPY N/A 12/28/2015    Procedure: COLONOSCOPY;  Surgeon: Dez Pickard MD;  Location:  OR    COLONOSCOPY N/A 2/3/2023    Procedure: COLONOSCOPY;  Surgeon: Raghav Lee DO;  Location: [facility]    COLONOSCOPY WITH CO2 INSUFFLATION N/A 12/28/2015    Procedure: COLONOSCOPY WITH CO2 INSUFFLATION;  Surgeon: Dez Pickard MD;  Location:  OR    NO HISTORY OF SURGERY         Review of Systems   Constitutional:  Negative for chills and fever.   HENT:  Negative for congestion, ear pain, hearing loss and sore throat.    Eyes:  Negative for pain and visual disturbance.   Respiratory:  Negative for cough.    Cardiovascular:  Negative for chest pain, palpitations and peripheral edema.   Gastrointestinal:  Positive for heartburn. Negative for abdominal pain,  "constipation, diarrhea, hematochezia and nausea.   Genitourinary:  Positive for frequency and urgency. Negative for dysuria, genital sores, hematuria, impotence and penile discharge.   Musculoskeletal:  Negative for arthralgias, joint swelling and myalgias.   Neurological:  Negative for dizziness, weakness, headaches and paresthesias.   Psychiatric/Behavioral:  Negative for mood changes. The patient is not nervous/anxious.          OBJECTIVE:   /76   Pulse 74   Temp 97.2  F (36.2  C) (Temporal)   Resp 18   Ht 1.8 m (5' 10.87\")   Wt 91.4 kg (201 lb 9.6 oz)   SpO2 94%   BMI 28.22 kg/m      Physical Exam  GENERAL: healthy, alert and no distress  EYES: Eyes grossly normal to inspection, PERRL and conjunctivae and sclerae normal  HENT: ear canals and TM's normal, nose and mouth without ulcers or lesions  NECK: no adenopathy, no asymmetry, masses, or scars and thyroid normal to palpation  RESP: lungs clear to auscultation - no rales, rhonchi or wheezes  CV: regular rate and rhythm, normal S1 S2, no S3 or S4, no murmur, click or rub, no peripheral edema and peripheral pulses strong  ABDOMEN: soft, nontender, no hepatosplenomegaly, no masses and bowel sounds normal POSITIVE umbilical hernia   (male): Deferred per patient no concerns moist, well rugated, and normal cervix/adnexa/uterus without masses or discharge  MS: no gross musculoskeletal defects noted, no edema, no CVA tenderness  SKIN: no suspicious lesions or rashes  NEURO: Normal strength and tone, cranial nerves intact, mentation intact and speech normal  PSYCH: mentation appears normal, affect normal/bright  LYMPH: no cervical, supraclavicular adenopathy    Diagnostic Test Results:  Labs reviewed in Epic  See orders    ASSESSMENT/PLAN:       ICD-10-CM    1. Routine general medical examination at a health care facility  Z00.00       2. Hyperlipidemia LDL goal <130  E78.5 Lipid panel reflex to direct LDL Non-fasting     rosuvastatin (CRESTOR) 10 MG " "tablet      3. Screening for diabetes mellitus  Z13.1 Glucose     Glucose      4. Screening for prostate cancer  Z12.5 PSA, screen     PSA, screen      5. Screening for thyroid disorder  Z13.29 TSH with free T4 reflex     TSH with free T4 reflex      6. Umbilical hernia without obstruction and without gangrene  K42.9 rosuvastatin (CRESTOR) 10 MG tablet     Adult General Surg Referral      7. Former smoker  Z87.891 CT Chest Lung Cancer Scrn Low Dose wo      8. Screening for lung cancer  Z12.2 CT Chest Lung Cancer Scrn Low Dose wo      9. Family history of ischemic heart disease  Z82.49           Patient has been advised of split billing requirements and indicates understanding: Yes      COUNSELING:   Reviewed preventive health counseling, as reflected in patient instructions      BMI:   Estimated body mass index is 28.22 kg/m  as calculated from the following:    Height as of this encounter: 1.8 m (5' 10.87\").    Weight as of this encounter: 91.4 kg (201 lb 9.6 oz).   Weight management plan: Discussed healthy diet and exercise guidelines discussed regular exercise, weight loss will help prevent heart disease, high cholesterol      He reports that he has quit smoking. He has been exposed to tobacco smoke. He has never used smokeless tobacco.            TEA MEDINA  [facility] DINO  "

## 2023-11-27 DIAGNOSIS — R97.20 ELEVATED PROSTATE SPECIFIC ANTIGEN (PSA): Primary | ICD-10-CM

## 2023-11-27 NOTE — RESULT ENCOUNTER NOTE
Chandana Garg,    Thank you for your recent office visit.    Here are your recent results.  Normal fasting glucose is under 127 you are not diabetic.  For a nondiabetic your cholesterol is considered normal.  We base your cholesterol off the bad cholesterol, the LDL, for a nondiabetic we want your LDL less than 190.  Your prostate level is elevated I will put a referral to urology for further evaluation.  Please call to schedule at this time.  Normal thyroid level.  Continue your cholesterol medication.    Feel free to contact me via [employer] or call the clinic at [phone].    Sincerely,    DYLAN Alonso, FNP-BC

## 2023-12-13 ENCOUNTER — MYC MEDICAL ADVICE (OUTPATIENT)
Dept: FAMILY MEDICINE | Facility: CLINIC | Age: 63
End: 2023-12-13
Payer: COMMERCIAL

## 2023-12-15 ENCOUNTER — OFFICE VISIT (OUTPATIENT)
Dept: UROLOGY | Facility: CLINIC | Age: 63
End: 2023-12-15
Attending: NURSE PRACTITIONER
Payer: COMMERCIAL

## 2023-12-15 VITALS
SYSTOLIC BLOOD PRESSURE: 134 MMHG | TEMPERATURE: 97.2 F | BODY MASS INDEX: 28.14 KG/M2 | DIASTOLIC BLOOD PRESSURE: 73 MMHG | HEART RATE: 54 BPM | OXYGEN SATURATION: 94 % | HEIGHT: 71 IN | WEIGHT: 201 LBS

## 2023-12-15 DIAGNOSIS — R97.20 ELEVATED PROSTATE SPECIFIC ANTIGEN (PSA): ICD-10-CM

## 2023-12-15 PROCEDURE — 99213 OFFICE O/P EST LOW 20 MIN: CPT | Performed by: STUDENT IN AN ORGANIZED HEALTH CARE EDUCATION/TRAINING PROGRAM

## 2023-12-15 ASSESSMENT — PAIN SCALES - GENERAL: PAINLEVEL: NO PAIN (0)

## 2023-12-15 NOTE — NURSING NOTE
"Initial /73   Pulse 54   Temp 97.2  F (36.2  C) (Tympanic)   Ht 1.8 m (5' 10.87\")   Wt 91.2 kg (201 lb)   SpO2 94%   BMI 28.14 kg/m   Estimated body mass index is 28.14 kg/m  as calculated from the following:    Height as of this encounter: 1.8 m (5' 10.87\").    Weight as of this encounter: 91.2 kg (201 lb). .  Active order to obtain bladder scan? Yes   Name of ordering provider:  Karolina Garay  Bladder scan preformed post void Yes.  Bladder scan reveled 28ML  Provider notified?  Yes    Karen BARRIOS CMA        "

## 2023-12-15 NOTE — PROGRESS NOTES
"        Chief Complaint:   Elevated PSA         History of Present Illness:   Forest Sandoval is a 63 year old male with a history of ERIN and HLD who presents for evaluation of elevated PSA.    PSA history:  11/24/2023: 6.78  4/14/2022: 4.0  3/8/2022: 5.33  6/27/2018: 2.39  9/24/2014: 1.25    He has never had a prostate MRI or prostate biopsy. He has no family history of prostate cancer.    He reports nocturia x 2-3. He tried tamsulosin, but did not find it helpful. He denies dysuria and sensation of incomplete bladder emptying.         Past Medical History:     Past Medical History:   Diagnosis Date    NO ACTIVE PROBLEMS             Past Surgical History:     Past Surgical History:   Procedure Laterality Date    COLONOSCOPY N/A 12/28/2015    Procedure: COLONOSCOPY;  Surgeon: Dez Pickard MD;  Location: [facility]    COLONOSCOPY N/A 2/3/2023    Procedure: COLONOSCOPY;  Surgeon: Raghav Lee DO;  Location: [facility]    COLONOSCOPY WITH CO2 INSUFFLATION N/A 12/28/2015    Procedure: COLONOSCOPY WITH CO2 INSUFFLATION;  Surgeon: Dez Pickard MD;  Location: [facility]    NO HISTORY OF SURGERY              Medications     Current Outpatient Medications   Medication    rosuvastatin (CRESTOR) 10 MG tablet     No current facility-administered medications for this visit.            Allergies:   Contrast dye, Sulfa antibiotics, and Tobramycin         Review of Systems:  From intake questionnaire   Negative 14 system review except as noted on HPI, nurse's note.         Physical Exam:   Patient is a 63 year old  male   Vitals: Blood pressure 134/73, pulse 54, temperature 97.2  F (36.2  C), temperature source Tympanic, height 1.8 m (5' 10.87\"), weight 91.2 kg (201 lb), SpO2 94%.  General Appearance Adult: Alert, no acute distress, oriented.  Lungs: Non-labored breathing.  Heart: No obvious jugular venous distension present.  Neuro: Alert, oriented, speech and mentation normal  : YANELI anodular, symmetric      Labs and " Pathology:    I personally reviewed all applicable laboratory data and went over findings with patient  Significant for:    CBC RESULTS:  Recent Labs   Lab Test 10/18/19  1903   WBC 6.3   HGB 14.2           BMP RESULTS:  Recent Labs   Lab Test 11/24/23  1155 03/08/22  1717 10/18/19  1903 06/27/18  0858   NA  --  137 139 140   POTASSIUM  --  5.1 4.0 4.6   CHLORIDE  --  104 106 106   CO2  --  28 26 29   ANIONGAP  --  5 7 5   * 86 79 95   BUN  --  23 22 20   CR  --  1.02 0.98 1.12   GFRESTIMATED  --  84 84 68   GFRESTBLACK  --   --  >90 82   KIKA  --  9.1 9.4 9.3     PSA RESULTS  PSA   Date Value Ref Range Status   06/27/2018 2.39 0 - 4 ug/L Final     Comment:     Assay Method:  Chemiluminescence using Siemens Vista analyzer   09/24/2014 1.25 0 - 4 ug/L Final     Prostate Specific Antigen Screen   Date Value Ref Range Status   11/24/2023 6.78 (H) 0.00 - 4.50 ng/mL Final   03/08/2022 5.33 (H) 0.00 - 4.00 ug/L Final            Assessment and Plan:     Assessment: 63 year old male who presents for evaluation of elevated PSA. We discussed the possible causes of elevated PSA including prostate cancer, prostate enlargement, UTI, voiding symptoms, and ejaculation or bike/motorcycle riding around the time of lab draw.     We discussed the evaluation of elevated PSA, including prostate MRI and subsequent prostate biopsy, if indicated. Given his mildly elevated PSA and normal YANELI, we discussed repeating the PSA in about one month vs obtaining a prostate MRI. The patient elected to repeat his PSA.     Plan:  Repeat PSA in one month. If the PSA remains elevated, will order prostate MRI.      CHELSI LEHMAN PA-C  Department of Urology

## 2024-02-09 ENCOUNTER — LAB (OUTPATIENT)
Dept: LAB | Facility: CLINIC | Age: 64
End: 2024-02-09
Payer: COMMERCIAL

## 2024-02-09 DIAGNOSIS — R97.20 ELEVATED PROSTATE SPECIFIC ANTIGEN (PSA): ICD-10-CM

## 2024-02-09 LAB — PSA SERPL DL<=0.01 NG/ML-MCNC: 5.46 NG/ML (ref 0–4.5)

## 2024-02-09 PROCEDURE — 36415 COLL VENOUS BLD VENIPUNCTURE: CPT

## 2024-02-09 PROCEDURE — 84153 ASSAY OF PSA TOTAL: CPT

## 2024-02-12 ENCOUNTER — MYC MEDICAL ADVICE (OUTPATIENT)
Dept: UROLOGY | Facility: CLINIC | Age: 64
End: 2024-02-12
Payer: COMMERCIAL

## 2024-02-12 DIAGNOSIS — R97.20 ELEVATED PROSTATE SPECIFIC ANTIGEN (PSA): Primary | ICD-10-CM

## 2024-02-12 DIAGNOSIS — Z91.041 CONTRAST MEDIA ALLERGY: ICD-10-CM

## 2024-02-13 RX ORDER — METHYLPREDNISOLONE 32 MG/1
TABLET ORAL
Qty: 2 TABLET | Refills: 0 | Status: SHIPPED | OUTPATIENT
Start: 2024-02-13 | End: 2024-05-14

## 2024-03-15 ENCOUNTER — HOSPITAL ENCOUNTER (OUTPATIENT)
Dept: MRI IMAGING | Facility: CLINIC | Age: 64
Discharge: HOME | End: 2024-03-15
Attending: STUDENT IN AN ORGANIZED HEALTH CARE EDUCATION/TRAINING PROGRAM | Admitting: STUDENT IN AN ORGANIZED HEALTH CARE EDUCATION/TRAINING PROGRAM
Payer: COMMERCIAL

## 2024-03-15 DIAGNOSIS — R97.20 ELEVATED PROSTATE SPECIFIC ANTIGEN (PSA): ICD-10-CM

## 2024-03-15 PROCEDURE — A9585 GADOBUTROL INJECTION: HCPCS | Performed by: STUDENT IN AN ORGANIZED HEALTH CARE EDUCATION/TRAINING PROGRAM

## 2024-03-15 PROCEDURE — 255N000002 HC RX 255 OP 636: Performed by: STUDENT IN AN ORGANIZED HEALTH CARE EDUCATION/TRAINING PROGRAM

## 2024-03-15 PROCEDURE — 72197 MRI PELVIS W/O & W/DYE: CPT

## 2024-03-15 PROCEDURE — 72197 MRI PELVIS W/O & W/DYE: CPT | Mod: 26 | Performed by: STUDENT IN AN ORGANIZED HEALTH CARE EDUCATION/TRAINING PROGRAM

## 2024-03-15 RX ORDER — GADOBUTROL 604.72 MG/ML
10 INJECTION INTRAVENOUS ONCE
Status: COMPLETED | OUTPATIENT
Start: 2024-03-15 | End: 2024-03-15

## 2024-03-15 RX ADMIN — GADOBUTROL 10 ML: 604.72 INJECTION INTRAVENOUS at 09:38

## 2024-03-19 ENCOUNTER — MYC MEDICAL ADVICE (OUTPATIENT)
Dept: UROLOGY | Facility: CLINIC | Age: 64
End: 2024-03-19
Payer: COMMERCIAL

## 2024-03-19 DIAGNOSIS — N32.89 BLADDER WALL THICKENING: Primary | ICD-10-CM

## 2024-03-22 ENCOUNTER — TELEPHONE (OUTPATIENT)
Dept: UROLOGY | Facility: CLINIC | Age: 64
End: 2024-03-22
Payer: COMMERCIAL

## 2024-03-22 NOTE — TELEPHONE ENCOUNTER
Left Voicemail (1st Attempt) for the patient to call back and schedule the following:    Appointment type: Prostate Biopsy  Provider: Dr. Strange, Dr. Argueta, or Dr. Dominique  Return date: Next available   Specialty phone number: [phone]  Additional appointment(s) needed: One week follow up with the same biopsy provider  Additonal Notes: Prostate biopsy and one week follow up per message received

## 2024-03-22 NOTE — TELEPHONE ENCOUNTER
[facility] Call Center    Phone Message    May a detailed message be left on voicemail: yes     Reason for Call: Other: pt calling to schedule biopsy and wants to do it in [place], please advise     Action Taken: Other: urology    Travel Screening: Not Applicable

## 2024-03-29 NOTE — TELEPHONE ENCOUNTER
Calfrancis x2, straight to voicemail.   Detailed voice message has been left for patient to contact me directly at [phone] to schedule prostate Bx at the [facility]

## 2024-04-25 ENCOUNTER — LAB (OUTPATIENT)
Dept: LAB | Facility: CLINIC | Age: 64
End: 2024-04-25
Payer: COMMERCIAL

## 2024-04-25 DIAGNOSIS — N32.89 BLADDER WALL THICKENING: ICD-10-CM

## 2024-04-30 NOTE — PROGRESS NOTES
CC: asymmetric bladder thickening and elevated PSA    History of Present Illness  The patient is a 63-year-old gentleman with a history of elevated PSA to 5.46 ng/mL on 2/9/24.  Had an MRI in response that showed a PIRADS 4 and 2 PIRADS 3 lesions.  There was asymmetrical bladder wall thickening on the right posteriorlateral wall noted on the scan as well.    The patient presented today for a cystoscopy to evaluate the lesion in the right bladder wall.    Cystoscopy: after informed consent was obtained, the patient was prepped and draped in standard sterile fashion. The flexible cystoscope was introduced into the patient's urethra without difficulty. There were no strictures in the urethra. Upon entering the bladder, the UOs were orthotopic and effluxing clear urine. There were no mucosal lesions, stones or foreign objects noted in the bladder.  There were moderate trabeculations and a few cellules.  There was no mucosal abnormality in the right posterior bladder visible.  The bladder neck appeared a little tight, but minimal prostatic hypertrophy.  The remainder of the exam was normal.    ASSESSMENT AND PLAN: Over half of today's 25-minute visit was spent reviewing the chart, results and counseling the patient regarding his bladder lesion.  We are pleased to see no cystoscopic evidence of abnormality.  We will follow up on the cytology which the patient left yesterday.  Otherwise, he will follow up for his biopsy.

## 2024-05-01 ENCOUNTER — LAB (OUTPATIENT)
Dept: LAB | Facility: CLINIC | Age: 64
End: 2024-05-01
Payer: COMMERCIAL

## 2024-05-01 DIAGNOSIS — N32.89 BLADDER WALL THICKENING: Primary | ICD-10-CM

## 2024-05-01 DIAGNOSIS — N32.89 BLADDER WALL THICKENING: ICD-10-CM

## 2024-05-01 LAB
ALBUMIN UR-MCNC: 100 MG/DL
APPEARANCE UR: CLEAR
BACTERIA #/AREA URNS HPF: ABNORMAL /HPF
BILIRUB UR QL STRIP: NEGATIVE
COLOR UR AUTO: YELLOW
GLUCOSE UR STRIP-MCNC: NEGATIVE MG/DL
HGB UR QL STRIP: ABNORMAL
KETONES UR STRIP-MCNC: NEGATIVE MG/DL
LEUKOCYTE ESTERASE UR QL STRIP: NEGATIVE
MUCOUS THREADS #/AREA URNS LPF: PRESENT /LPF
NITRATE UR QL: NEGATIVE
PH UR STRIP: 6 [PH] (ref 5–7)
RBC #/AREA URNS AUTO: ABNORMAL /HPF
SP GR UR STRIP: >=1.03 (ref 1–1.03)
SQUAMOUS #/AREA URNS AUTO: ABNORMAL /LPF
UROBILINOGEN UR STRIP-ACNC: 0.2 E.U./DL
WBC #/AREA URNS AUTO: ABNORMAL /HPF

## 2024-05-01 PROCEDURE — 88112 CYTOPATH CELL ENHANCE TECH: CPT | Performed by: PATHOLOGY

## 2024-05-01 PROCEDURE — 81001 URINALYSIS AUTO W/SCOPE: CPT

## 2024-05-02 ENCOUNTER — OFFICE VISIT (OUTPATIENT)
Dept: UROLOGY | Facility: CLINIC | Age: 64
End: 2024-05-02
Payer: COMMERCIAL

## 2024-05-02 VITALS
DIASTOLIC BLOOD PRESSURE: 82 MMHG | SYSTOLIC BLOOD PRESSURE: 126 MMHG | HEART RATE: 76 BPM | WEIGHT: 206.2 LBS | HEIGHT: 72 IN | TEMPERATURE: 97.1 F | OXYGEN SATURATION: 97 % | BODY MASS INDEX: 27.93 KG/M2 | RESPIRATION RATE: 16 BRPM

## 2024-05-02 DIAGNOSIS — N32.89 BLADDER WALL THICKENING: Primary | ICD-10-CM

## 2024-05-02 LAB
PATH REPORT.COMMENTS IMP SPEC: NORMAL
PATH REPORT.FINAL DX SPEC: NORMAL
PATH REPORT.GROSS SPEC: NORMAL
PATH REPORT.MICROSCOPIC SPEC OTHER STN: NORMAL

## 2024-05-02 PROCEDURE — 52000 CYSTOURETHROSCOPY: CPT | Performed by: UROLOGY

## 2024-05-02 ASSESSMENT — PAIN SCALES - GENERAL: PAINLEVEL: NO PAIN (0)

## 2024-05-02 NOTE — NURSING NOTE
Chief Complaint   Patient presents with    Cystoscopy       Vitals:    05/02/24 0847   BP: 126/82   BP Location: Left arm   Patient Position: Sitting   Cuff Size: Adult Regular   Pulse: 76   Resp: 16   Temp: 97.1  F (36.2  C)   TempSrc: Tympanic   SpO2: 97%   Weight: 93.5 kg (206 lb 3.2 oz)   Height: 1.829 m (6')     Wt Readings from Last 1 Encounters:   05/02/24 93.5 kg (206 lb 3.2 oz)       Mariely MELISSA CMA.................5/2/2024

## 2024-05-06 NOTE — TELEPHONE ENCOUNTER
REFERRAL INFORMATION:  Referring Provider: Roxy Montoya NP  Referring Clinic: [facility] - Primary [facility]  Reason for Visit/Diagnosis: Umbilical Hernia       FUTURE VISIT INFORMATION:  Appointment Date: 2024  Appointment Time: 9 AM     NOTES RECORD STATUS  DETAILS   OFFICE NOTE from Referring Provider Internal [facility]:  23 - PCC OV with Roxy Montoya NP   OFFICE NOTE from Other Specialists Internal [facility]:  24 - URO OV with Dr. Argueta   [facility] [place] DISCHARGE SUMMARY/ ED VISITS  N/A    OPERATIVE REPORT N/A    PERTINENT LABS Internal    IMAGING (CT, MRI, US, XR)  Internal [facility]:  3/15/24 - MRI Prostate

## 2024-05-07 ENCOUNTER — PRE VISIT (OUTPATIENT)
Dept: SURGERY | Facility: CLINIC | Age: 64
End: 2024-05-07

## 2024-05-07 ENCOUNTER — OFFICE VISIT (OUTPATIENT)
Dept: SURGERY | Facility: CLINIC | Age: 64
End: 2024-05-07
Payer: COMMERCIAL

## 2024-05-07 VITALS
HEIGHT: 71 IN | BODY MASS INDEX: 28.97 KG/M2 | SYSTOLIC BLOOD PRESSURE: 151 MMHG | HEART RATE: 63 BPM | DIASTOLIC BLOOD PRESSURE: 93 MMHG | OXYGEN SATURATION: 100 % | WEIGHT: 206.9 LBS

## 2024-05-07 DIAGNOSIS — K42.9 UMBILICAL HERNIA WITHOUT OBSTRUCTION AND WITHOUT GANGRENE: Primary | ICD-10-CM

## 2024-05-07 PROCEDURE — 99203 OFFICE O/P NEW LOW 30 MIN: CPT | Performed by: SURGERY

## 2024-05-07 RX ORDER — CEFAZOLIN SODIUM 2 G/50ML
2 SOLUTION INTRAVENOUS
Status: CANCELLED | OUTPATIENT
Start: 2024-05-07

## 2024-05-07 RX ORDER — CEFAZOLIN SODIUM 2 G/50ML
2 SOLUTION INTRAVENOUS SEE ADMIN INSTRUCTIONS
Status: CANCELLED | OUTPATIENT
Start: 2024-05-07

## 2024-05-07 ASSESSMENT — PAIN SCALES - GENERAL: PAINLEVEL: NO PAIN (0)

## 2024-05-07 NOTE — LETTER
5/7/2024       RE: Forest Sandoval  [id number] [facility] [id number]       Dear Colleague,    Thank you for referring your patient, Forest Sandoval, to the [facility]. Please see a copy of my visit note below.    Forest Sandoval is a 63 year old male with a 5 month history of an umbilical hernia with the following symptoms of lump.      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    Past medical history, medications, allergies, family history, and social history were reviewed with the patient.    Past Medical History:   Diagnosis Date    NO ACTIVE PROBLEMS      Past Surgical History:   Procedure Laterality Date    COLONOSCOPY N/A 12/28/2015    Procedure: COLONOSCOPY;  Surgeon: Dez Pickard MD;  Location: [facility]    COLONOSCOPY N/A 2/3/2023    Procedure: COLONOSCOPY;  Surgeon: Raghav eLe DO;  Location: [facility]    COLONOSCOPY WITH CO2 INSUFFLATION N/A 12/28/2015    Procedure: COLONOSCOPY WITH CO2 INSUFFLATION;  Surgeon: Dez Pickard MD;  Location: [facility]    NO HISTORY OF SURGERY       ROS: 10 point review of systems negative except noted in HPI   PHYSICAL EXAM  General appearance- healthy, alert, and in no distress.  Skin- Skin color and turgor normal.  No obvious rashes.  Lungs- Respiratory effort unlabored.  Gait- Normal.  Abdomen - soft non distended, non tender umbilical hernia.  Impression:  Hernia, umbilical  Recommendation:  open mesh repair umbilical hernia surgery    A full discussion regarding the alternatives, risks, goals, and potential complications for this surgery was completed today.  The patient understood that the potential problems included but are not limited to:  Infection,  bleeding, hematoma, seroma, and recurrence.    The patient verbally expressed understanding, was given the opportunity for questions, and gives full informed consent for the procedure.      Today the patient was instructed on the need for a preoperative H&P, NPO status prior to surgery, and the need to stop anticoagulants prior to surgery.  Additional educational material, soap, and instructions will be mailed out to the patients home.    The total time spent with this patient was 30 minutes.  The total time was spent on the date of encounter doing chart review, history and physical, dressing changes, documentation, patient education, and any further activity as noted above.         Again, thank you for allowing me to participate in the care of your patient.      Sincerely,    Elver Garza MD

## 2024-05-07 NOTE — NURSING NOTE
"Chief Complaint   Patient presents with    New Patient     Umbilical hernia       Vitals:    05/07/24 0854   BP: (!) 151/93   BP Location: Left arm   Patient Position: Sitting   Cuff Size: Adult Regular   Pulse: 63   SpO2: 100%   Weight: 93.8 kg (206 lb 14.4 oz)   Height: 1.803 m (5' 11\")       Body mass index is 28.86 kg/m .                          Santino Friedman, EMT    "

## 2024-05-07 NOTE — NURSING NOTE
Pre and Post op Patient Education/Teaching Flowsheet  Relevant Diagnosis:  Umbilical Hernia (K42.0)  Teaching Topic:  Pre and post op teaching  Person(s) Involved in teaching:  Patient     Motivation Level:  Asks Questions:  Yes  Eager to Learn:  Yes  Cooperative:  Yes  Receptive (willing/able to accept information):  Yes  Any cultural factors/Taoist beliefs that may influence understanding or compliance?  No    Patient/caregiver/family demonstrates understanding of the following:  Reason for the appointment, diagnosis, and treatment plan:  Yes  Patient demonstrates understanding of the following:  Pre-op bowel prep:  N/A  Post-op pain management recommendations (medications, ice compress, binder/athletic supporter (if applicable), etc.:  Yes  Inguinal hernia patients:  Post-op urinary retention- discussed signs/symptoms and visit to ER for Yeung catheter placement and to stay in place for at least 48 hours:  NA  Restrictions:  Yes  Medications to take the day of surgery:  Per PCP  Blood thinner medications discussed and when to stop (if applicable):  Yes  Wound care:  Yes  Diabetes medication management (if applicable):  Per PCP  Which situations necessitate calling provider and whom to contact:  Discussed how to contact the hospital, nurse, and clinic scheduling staff if necessary    Infection Prevention: Patient demonstrates understanding of the following:  Patient instructed on hand hygiene:  Yes  Surgical procedure site care will be taught and will be reviewed at the time of discharge  Signs and symptoms of infection taught:  Yes  Wound care reviewed and will be taught at the time of discharge  Central venous catheter care will be taught at the time of discharge (if applicable)    Post-op follow-up:  Instructional materials used/given/mailed:  Surgical logistics, post op teaching sheet, and surgical scrub    Logistics:  Date and time of surgery:  TBD  Location of surgery: [facility] 5th [facility]  History and Physical and any other testing necessary prior to surgery:  Yes  Required time line for completion of History and Physical and any pre-op testing:  Yes  Discuss need for someone to drive patient home and stay with them for 24 hours:  Yes  Pre-op showering/scrub information with Surgical Scrub:  Yes  NPO Guidelines:  NPO per Anesthesia Guidelines

## 2024-05-07 NOTE — PROGRESS NOTES
Forest Sandoval is a 63 year old male with a 5 month history of an umbilical hernia with the following symptoms of lump.      Onset did occur with lifting.was at [employer] boat  Obstructive symptoms:  no  Urinary difficulties:  no  Chronic cough: no  Constipation:  no  Current level of activity:  works as banker but very active outside work.    Past medical history, medications, allergies, family history, and social history were reviewed with the patient.    Past Medical History:   Diagnosis Date    NO ACTIVE PROBLEMS      Past Surgical History:   Procedure Laterality Date    COLONOSCOPY N/A 12/28/2015    Procedure: COLONOSCOPY;  Surgeon: Dez Pickard MD;  Location: [facility]    COLONOSCOPY N/A 2/3/2023    Procedure: COLONOSCOPY;  Surgeon: Raghav Lee DO;  Location: [facility]    COLONOSCOPY WITH CO2 INSUFFLATION N/A 12/28/2015    Procedure: COLONOSCOPY WITH CO2 INSUFFLATION;  Surgeon: Dez Pickard MD;  Location:  OR    NO HISTORY OF SURGERY       ROS: 10 point review of systems negative except noted in HPI   PHYSICAL EXAM  General appearance- healthy, alert, and in no distress.  Skin- Skin color and turgor normal.  No obvious rashes.  Lungs- Respiratory effort unlabored.  Gait- Normal.  Abdomen - soft non distended, non tender umbilical hernia.  Impression:  Hernia, umbilical  Recommendation:  open mesh repair umbilical hernia surgery    A full discussion regarding the alternatives, risks, goals, and potential complications for this surgery was completed today.  The patient understood that the potential problems included but are not limited to:  Infection, bleeding, hematoma, seroma, and recurrence.    The patient verbally expressed understanding, was given the opportunity for questions, and gives full informed consent for the procedure.      Today the patient was instructed on the need for a preoperative H&P, NPO status prior to surgery, and the need to stop anticoagulants prior to  surgery.  Additional educational material, soap, and instructions will be mailed out to the patients home.    The total time spent with this patient was 30 minutes.  The total time was spent on the date of encounter doing chart review, history and physical, dressing changes, documentation, patient education, and any further activity as noted above.

## 2024-05-07 NOTE — PATIENT INSTRUCTIONS
You met with Dr. Elver Garza.      Today's visit instructions:    Reminder: Surgery Requirements  Your surgery will be at [facility] [place]- 5th Floor.  You will need to arrive 1.5  hours early.  You will need someone to drive you home (over 18 years old) and stay with you for 24 hours after the procedure.  You will need a preop physical with your regular doctor within 30 days of surgery- closer is always better.  Stop any blood thinners, vitamins, minerals, or herbal supplements 5 days before surgery.  If you are taking a prescribed blood thinner or weight loss medication please let us know for specific instructions.  Fasting- a nurse from [facility] will call you 1-2 days before surgery to confirm your procedure and tell you when to stop eating and drinking.   Wash with the soap given/mailed from the clinic the night before surgery and morning of surgery. See instructions in the Surgery Packet.  If you would like a procedure estimate please call [facility] at [phone] during the hours of 8 AM - 3 PM (option #2 for on-line request and option #3 for a representative).        If you have questions please contact Enma RN or Judi RN during regular clinic hours, Monday through Friday 7:30 AM - 4:00 PM, or you can contact us via [employer] at anytime.       If you have urgent needs after-hours, weekends, or holidays please call the hospital at [phone] and ask to speak with our on-call General Surgery Team.    Appointment [phone]  Nurse Advice (Enma or Judi): [phone]   Surgery Scheduler (Lizette): [phone]  Fax: [phone]    After Your Open Umbilical Hernia Repair          Incision care   You may take a shower the day after surgery. Carefully wash your incision with soap and water. Do not submerge yourself in water (bath, whirlpool, hot tub, pool, lake) for 14 days after surgery. Always wash your hands before touching your incisions or removing  bandages.  Remove the bandage 1-2 days after surgery, but leave the medical tape (Steri-Strips) or glue in place. These will loosen and fall off on their own 1-2 weeks after surgery.     It is not unusual to form a collection of fluid or blood under your incision that may feel firm or squishy- it can take several weeks to months for your body to reabsorb it.  At times, it may even drain.  If that should happen keep the area clean with soap, water,  and cover with a clean gauze dressing. You can change this daily or as needed.      Other medicines   Wait to start aspirin or blood thinners until the day after surgery. You can continue your regular medicines at your normal time the day after surgery.   Your pain medicine may cause constipation (hard, dry stools). To help with this, take the stool softener your doctor gave you or an over-the-counter stool softener or laxative. You can stop taking this when you are no longer taking pain medicine and your bowel movements are back to normal.      For pain or discomfort   Take the narcotic pain medicine your doctor gave you as needed and as instructed on the bottle. If you prefer to use over-the-counter medication, use acetaminophen (Tylenol) or ibuprofen (Advil, Motrin) as instructed on the box. Do not take Tylenol if it is in your narcotic pain medication.   Use an ice pack on your incision (surgical cut) for 20 minutes at a time as needed for the first 24 hours. Be sure to protect your skin by putting a cloth between the ice pack and your skin.   After 24 hours you can switch to heat for 20 minutes as needed. Be sure to protect your skin by putting a cloth between the heat pack and your skin.         Activities   No driving until you feel it s safe to do so. Don t drive while taking narcotic pain medicine.   Don t lift anything heavier than 20 pounds for 3 to 4 weeks after surgery.      Special equipment   If we gave you an abdominal binder, wear it for the first 30 days  after surgery. You don t have to wear it when you re sleeping. You can wear it longer than 30 days if you wish.      Diet   You can eat your regular meals after surgery.     Dental Care  Please avoid dental care for two weeks prior to hernia repair and for 6 weeks after surgery due to the mesh that may be placed.    Research study: Surgical Site Infection  You are eligible for a research study which is examining the causes of surgical site infection. During the study, your surgeon will swab parts of your body at different times. Please let Judi ARELLANO or Enma RN know if you would like to participate.     When to call the doctor   Call your doctor if you have:   A fever above 101 F (38.3 C) (taken under the tongue), or a fever or chills lasting more than a day.   Redness at the incision site.   Any fluid or blood draining from the incision, especially if it smells bad.    Severe pain that doesn t improve with pain medicine.      We will call you 2 to 4 days after surgery to review this handout, answer questions and help arrange after-surgery care. If you have questions or concerns, please call [phone] during regular office hours. If you need to call after business hours, call [phone] and ask to page the surgeon on-call.    IMPORTANT:  Prior to your surgical procedure, a nurse will be contacting you to obtain a health history.   If they do not reach you by noon the day prior to your surgery, your surgery will be cancelled. If you have your Pre-Op Surgical Physical (H&P) with the Anesthesia Team (PAC), you are exempt from this call. Phone:  [phone] ([facility]) or [phone] ([facility]).

## 2024-05-08 ENCOUNTER — TELEPHONE (OUTPATIENT)
Dept: UROLOGY | Facility: CLINIC | Age: 64
End: 2024-05-08
Payer: COMMERCIAL

## 2024-05-08 ENCOUNTER — HOSPITAL ENCOUNTER (OUTPATIENT)
Facility: AMBULATORY SURGERY CENTER | Age: 64
End: 2024-05-08
Attending: SURGERY
Payer: COMMERCIAL

## 2024-05-08 ENCOUNTER — TELEPHONE (OUTPATIENT)
Dept: SURGERY | Facility: CLINIC | Age: 64
End: 2024-05-08
Payer: COMMERCIAL

## 2024-05-08 PROBLEM — K42.9 UMBILICAL HERNIA WITHOUT OBSTRUCTION AND WITHOUT GANGRENE: Status: ACTIVE | Noted: 2023-11-24

## 2024-05-08 NOTE — TELEPHONE ENCOUNTER
Left voicemail for patient regarding scheduling surgery with Dr. Garza.    Provided contact number to discuss.    P: [phone]    __    Lizette Noguera, on 5/8/2024 at 10:02 AM

## 2024-05-08 NOTE — TELEPHONE ENCOUNTER
Patient is scheduled for surgery with Dr. Garza    Spoke with: Bill    Date of Surgery: 5/30/2024    Location: [facility]    Informed patient they will need an adult : Yes    Pre op with Provider n/a    H&P: Scheduled with [facility]    Additional imaging/appointments: n/a    Surgery packet: To be sent via [employer]     Additional comments: n/a        Lizette Noguera on 5/8/2024 at 3:59 PM

## 2024-05-09 ENCOUNTER — PREP FOR PROCEDURE (OUTPATIENT)
Dept: UROLOGY | Facility: CLINIC | Age: 64
End: 2024-05-09
Payer: COMMERCIAL

## 2024-05-09 DIAGNOSIS — R97.20 ELEVATED PROSTATE SPECIFIC ANTIGEN (PSA): Primary | ICD-10-CM

## 2024-05-09 RX ORDER — CEFAZOLIN SODIUM 2 G/50ML
2 SOLUTION INTRAVENOUS
Status: CANCELLED | OUTPATIENT
Start: 2024-05-09

## 2024-05-09 RX ORDER — CEFAZOLIN SODIUM 2 G/50ML
2 SOLUTION INTRAVENOUS SEE ADMIN INSTRUCTIONS
Status: CANCELLED | OUTPATIENT
Start: 2024-05-09

## 2024-05-14 ENCOUNTER — OFFICE VISIT (OUTPATIENT)
Dept: FAMILY MEDICINE | Facility: CLINIC | Age: 64
End: 2024-05-14
Payer: COMMERCIAL

## 2024-05-14 ENCOUNTER — HOSPITAL ENCOUNTER (OUTPATIENT)
Facility: CLINIC | Age: 64
End: 2024-05-14
Attending: UROLOGY | Admitting: UROLOGY
Payer: COMMERCIAL

## 2024-05-14 VITALS
HEIGHT: 72 IN | TEMPERATURE: 97.1 F | BODY MASS INDEX: 28.06 KG/M2 | WEIGHT: 207.2 LBS | HEART RATE: 65 BPM | DIASTOLIC BLOOD PRESSURE: 72 MMHG | SYSTOLIC BLOOD PRESSURE: 120 MMHG | RESPIRATION RATE: 20 BRPM | OXYGEN SATURATION: 97 %

## 2024-05-14 DIAGNOSIS — Z09 FOLLOW-UP EXAM: ICD-10-CM

## 2024-05-14 DIAGNOSIS — R06.83 SNORING: ICD-10-CM

## 2024-05-14 DIAGNOSIS — Z87.891 FORMER SMOKER: ICD-10-CM

## 2024-05-14 DIAGNOSIS — K42.9 UMBILICAL HERNIA WITHOUT OBSTRUCTION AND WITHOUT GANGRENE: ICD-10-CM

## 2024-05-14 DIAGNOSIS — Z01.818 PREOP GENERAL PHYSICAL EXAM: Primary | ICD-10-CM

## 2024-05-14 DIAGNOSIS — K04.7 DENTAL INFECTION: ICD-10-CM

## 2024-05-14 PROCEDURE — 99214 OFFICE O/P EST MOD 30 MIN: CPT | Performed by: NURSE PRACTITIONER

## 2024-05-14 PROCEDURE — 93000 ELECTROCARDIOGRAM COMPLETE: CPT | Performed by: NURSE PRACTITIONER

## 2024-05-14 RX ORDER — CALCIUM CARBONATE 500 MG/1
1 TABLET, CHEWABLE ORAL 2 TIMES DAILY
COMMUNITY
End: 2024-07-02

## 2024-05-14 RX ORDER — CHLORHEXIDINE GLUCONATE ORAL RINSE 1.2 MG/ML
15 SOLUTION DENTAL 2 TIMES DAILY
Qty: 473 ML | Refills: 0 | Status: SHIPPED | OUTPATIENT
Start: 2024-05-14 | End: 2024-07-02

## 2024-05-14 NOTE — RESULT ENCOUNTER NOTE
Chandana Garg,    Thank you for your recent office visit.    Here are your recent results.  Normal EKG    Feel free to contact me via [employer] or call the clinic at [phone].    Sincerely,    DYLAN Alonso, FNP-BC

## 2024-05-14 NOTE — PATIENT INSTRUCTIONS
Preparing for Your Surgery  Getting started  A nurse will call you to review your health history and instructions. They will give you an arrival time based on your scheduled surgery time. Please be ready to share:  Your doctor's clinic name and phone number  Your medical, surgical, and anesthesia history  A list of allergies and sensitivities  A list of medicines, including herbal treatments and over-the-counter drugs  Whether the patient has a legal guardian (ask how to send us the papers in advance)  Please tell us if you're pregnant--or if there's any chance you might be pregnant. Some surgeries may injure a fetus (unborn baby), so they require a pregnancy test. Surgeries that are safe for a fetus don't always need a test, and you can choose whether to have one.   If you have a child who's having surgery, please ask for a copy of Preparing for Your Child's Surgery.    Preparing for surgery  Within 10 to 30 days of surgery: Have a pre-op exam (sometimes called an H&P, or History and Physical). This can be done at a clinic or pre-operative center.  If you're having a , you may not need this exam. Talk to your care team.  At your pre-op exam, talk to your care team about all medicines you take. If you need to stop any medicines before surgery, ask when to start taking them again.  We do this for your safety. Many medicines can make you bleed too much during surgery. Some change how well surgery (anesthesia) drugs work.  Call your insurance company to let them know you're having surgery. (If you don't have insurance, call [phone].)  Call your clinic if there's any change in your health. This includes signs of a cold or flu (sore throat, runny nose, cough, rash, fever). It also includes a scrape or scratch near the surgery site.  If you have questions on the day of surgery, call your hospital or surgery center.  Eating and drinking guidelines  For your safety: Unless your surgeon tells you otherwise,  follow the guidelines below.  Eat and drink as usual until 8 hours before you arrive for surgery. After that, no food or milk.  Drink clear liquids until 2 hours before you arrive. These are liquids you can see through, like water, Gatorade, and Propel Water. They also include plain black coffee and tea (no cream or milk), candy, and breath mints. You can spit out gum when you arrive.  If you drink alcohol: Stop drinking it the night before surgery.  If your care team tells you to take medicine on the morning of surgery, it's okay to take it with a sip of water.  Preventing infection  Shower or bathe the night before and morning of your surgery. Follow the instructions your clinic gave you. (If no instructions, use regular soap.)  Don't shave or clip hair near your surgery site. We'll remove the hair if needed.  Don't smoke or vape the morning of surgery. You may chew nicotine gum up to 2 hours before surgery. A nicotine patch is okay.  Note: Some surgeries require you to completely quit smoking and nicotine. Check with your surgeon.  Your care team will make every effort to keep you safe from infection. We will:  Clean our hands often with soap and water (or an alcohol-based hand rub).  Clean the skin at your surgery site with a special soap that kills germs.  Give you a special gown to keep you warm. (Cold raises the risk of infection.)  Wear special hair covers, masks, gowns and gloves during surgery.  Give antibiotic medicine, if prescribed. Not all surgeries need antibiotics.  What to bring on the day of surgery  Photo ID and insurance card  Copy of your health care directive, if you have one  Glasses and hearing aids (bring cases)  You can't wear contacts during surgery  Inhaler and eye drops, if you use them (tell us about these when you arrive)  CPAP machine or breathing device, if you use them  A few personal items, if spending the night  If you have . . .  A pacemaker, ICD (cardiac defibrillator) or other  implant: Bring the ID card.  An implanted stimulator: Bring the remote control.  A legal guardian: Bring a copy of the certified (court-stamped) guardianship papers.  Please remove any jewelry, including body piercings. Leave jewelry and other valuables at home.  If you're going home the day of surgery  You must have a responsible adult drive you home. They should stay with you overnight as well.  If you don't have someone to stay with you, and you aren't safe to go home alone, we may keep you overnight. Insurance often won't pay for this.  After surgery  If it's hard to control your pain or you need more pain medicine, please call your surgeon's office.  Questions?   If you have any questions for your care team, list them here: _________________________________________________________________________________________________________________________________________________________________________ ____________________________________ ____________________________________ ____________________________________  For informational purposes only. Not to replace the advice of your health care provider. Copyright   2003, 2019 [employer]. All rights reserved. Clinically reviewed by Marlyn Evans MD. [employer] 892068 - REV 12/22.    How to Take Your Medication Before Surgery  - STOP taking all vitamins and herbal supplements 14 days before surgery.  - I no blood thinners :   Aspirin Aleve Excedrin fish oil if in pain use Tylenol

## 2024-05-14 NOTE — PROGRESS NOTES
Preoperative Evaluation  [facility] [place]  [address] [address] [facility]  [place] [address]  Phone: [phone]  Primary Provider: Karen [place]  Pre-op Performing Provider: MARYJANE PAINTER  May 14, 2024       Fabrizio is a 63 year old, presenting for the following:  Pre-Op Exam (Has left infected root canal is swollen would possibly like an antibiotic. Would like contrast dye removed from allergy list.)        5/14/2024     8:34 AM   Additional Questions   Roomed by Janet Bernard MA         5/14/2024     8:34 AM   Patient Reported Additional Medications   Patient reports taking the following new medications No new medications     Surgical Information  Surgery/Procedure: HERNIORRHAPHY, UMBILICAL, OPEN   Surgery Location: [facility] [address]   Surgeon: Dr. Elver Garza  Surgery Date: 5/30/24  Time of Surgery: 8:30am  Where patient plans to recover: At home with family  Fax number for surgical facility: unknown    Assessment & Plan     The proposed surgical procedure is considered INTERMEDIATE risk.    Preop general physical exam    - EKG 12-lead complete w/read - Clinics    Umbilical hernia without obstruction and without gangrene      Follow-up exam      Dental infection    - amoxicillin-clavulanate (AUGMENTIN) 875-125 MG tablet; Take 1 tablet by mouth 2 times daily With daily yogurt or probiotics  - chlorhexidine (PERIDEX) 0.12 % solution; Swish and spit 15 mLs in mouth 2 times daily Use after oral antibiotics are finished      Possible Sleep Apnea: pt declining sleep study, risks of untreated sleep apnea discussed    Risks and Recommendations  The patient has the following additional risks and recommendations for perioperative complications:   - No identified additional risk factors other than previously addressed    Antiplatelet or Anticoagulation Medication Instructions   - Patient is on no antiplatelet or anticoagulation  medications.    Additional Medication Instructions  Patient is to take all scheduled medications on the day of surgery EXCEPT for modifications listed below: Advised to refrain from blood thinners    Recommendation  APPROVAL GIVEN to proceed with proposed procedure, without further diagnostic evaluation.    Review of external notes as documented elsewhere in note  Ordering of each unique test  Prescription drug management  30 minutes spent by me on the date of the encounter doing chart review, history and exam, documentation and further activities per the note    Subjective     He had a partial root canal by his orthodontist, treated dental infection with amoxicillin.  Dental infection improved with completion of antibiotics but now that he stopped antibiotics dental infection has worsened.  Has not gotten back in contact with his orthodontist yet.  Discussed treating with Augmentin, eating daily yogurt or taking probiotics.  Additionally when antibiotics are completed he can use chlorhexidine mouthwash until he has surgery and can follow-up with his orthodontist.  If symptoms are worsening he needs to contact his orthodontist for further evaluation.    HPI related to upcoming procedure: HERNIORRHAPHY, UMBILICAL, OPEN         5/8/2024     4:30 PM   Preop Questions   1. Have you ever had a heart attack or stroke? No   2. Have you ever had surgery on your heart or blood vessels, such as a stent placement, a coronary artery bypass, or surgery on an artery in your head, neck, heart, or legs? No   3. Do you have chest pain with activity? No   4. Do you have a history of heart failure? No   5. Do you currently have a cold, bronchitis or symptoms of other infection? No   6. Do you have a cough, shortness of breath, or wheezing? No   7. Do you or anyone in your family have previous history of blood clots? No   8. Do you or does anyone in your family have a serious bleeding problem such as prolonged bleeding following  surgeries or cuts? No   9. Have you ever had problems with anemia or been told to take iron pills? No   10. Have you had any abnormal blood loss such as black, tarry or bloody stools? No   11. Have you ever had a blood transfusion? No   12. Are you willing to have a blood transfusion if it is medically needed before, during, or after your surgery? Yes   13. Have you or any of your relatives ever had problems with anesthesia? No   14. Do you have sleep apnea, excessive snoring or daytime drowsiness? Unknown-has been told he snores, holds breath at times.  Discussed risks of untreated sleep apnea.  Discussed sleep study he is declining   15. Do you have any artifical heart valves or other implanted medical devices like a pacemaker, defibrillator, or continuous glucose monitor? No   16. Do you have artificial joints? No   17. Are you allergic to latex? No       Health Care Directive  Patient does not have a Health Care Directive or Living Will: Discussed advance care planning with patient; information given to patient to review.    Preoperative Review of    reviewed - no record of controlled substances prescribed.      Status of Chronic Conditions:    HYPERLIPIDEMIA - Patient has a long history of significant Hyperlipidemia requiring medication for treatment with recent fair control. Patient reports no problems or side effects with the medication.     Patient Active Problem List    Diagnosis Date Noted    Family history of ischemic heart disease 11/24/2023     Priority: Medium    Former smoker 11/24/2023     Priority: Medium    Umbilical hernia without obstruction and without gangrene 11/24/2023     Priority: Medium    Generalized anxiety disorder 02/25/2015     Priority: Medium    BPH (benign prostatic hyperplasia) 09/25/2014     Priority: Medium    DJD (degenerative joint disease) of knee 09/25/2014     Priority: Medium    Dermatitis 07/17/2012     Priority: Medium    Hyperlipidemia LDL goal <130 09/16/2010      Priority: Medium      Past Medical History:   Diagnosis Date    NO ACTIVE PROBLEMS      Past Surgical History:   Procedure Laterality Date    COLONOSCOPY N/A 12/28/2015    Procedure: COLONOSCOPY;  Surgeon: Dez Pickard MD;  Location: [facility] OR    COLONOSCOPY N/A 2/3/2023    Procedure: COLONOSCOPY;  Surgeon: Raghav Lee DO;  Location: [facility]    COLONOSCOPY WITH CO2 INSUFFLATION N/A 12/28/2015    Procedure: COLONOSCOPY WITH CO2 INSUFFLATION;  Surgeon: Dez Pickard MD;  Location: [facility] OR    NO HISTORY OF SURGERY       Current Outpatient Medications   Medication Sig Dispense Refill    rosuvastatin (CRESTOR) 10 MG tablet Take 1 tablet (10 mg) by mouth daily 90 tablet 3    AMOXICILLIN PO  (Patient not taking: Reported on 5/7/2024)      methylPREDNISolone (MEDROL) 32 MG tablet Take one tablet 12 hours prior to MRI. Take the other tablet 2 hours prior to MRI. (Patient not taking: Reported on 5/2/2024) 2 tablet 0       Allergies   Allergen Reactions    Contrast Dye Other (See Comments)     LW CM1: >>> NO CONTRAST ADVERSE REACTION <<<     Reaction :    Sulfa Antibiotics Unknown    Tobramycin Unknown        Social History     Tobacco Use    Smoking status: Former     Passive exposure: Past    Smokeless tobacco: Never   Substance Use Topics    Alcohol use: Not Currently     Family History   Problem Relation Age of Onset    Myocardial Infarction Mother 66    Abdominal Aortic Aneurysm Mother     Heart Disease Mother     Alcohol/Drug Father     Heart Failure Father     Heart Disease Father     Cancer Sister         BCC and Melanoma    Myocardial Infarction Sister      History   Drug Use No         Review of Systems    Review of Systems  CONSTITUTIONAL: NEGATIVE for fever, chills, change in weight  INTEGUMENTARY/SKIN: NEGATIVE for worrisome rashes, moles or lesions  EYES: NEGATIVE for vision changes or irritation  ENT/MOUTH: NEGATIVE for ear, mouth and throat problems  RESP: NEGATIVE for significant cough or  "SOB  BREAST: NEGATIVE for masses, tenderness or discharge  CV: NEGATIVE for chest pain, palpitations or peripheral edema  GI: NEGATIVE for nausea, change in bowel habits POSITIVE for stomach aches more often, occasional heart burn treated with OTC PRN  : NEGATIVE for frequency, dysuria, or hematuria  MUSCULOSKELETAL: NEGATIVE for significant arthralgias or myalgia  NEURO: NEGATIVE for weakness, dizziness or paresthesias  ENDOCRINE: NEGATIVE for temperature intolerance, skin/hair changes  HEME: NEGATIVE for bleeding problems  PSYCHIATRIC: NEGATIVE for changes in mood or affect    Objective    /72   Pulse 65   Temp 97.1  F (36.2  C) (Temporal)   Resp 20   Ht 1.83 m (6' 0.05\")   Wt 94 kg (207 lb 3.2 oz)   SpO2 97%   BMI 28.06 kg/m     Estimated body mass index is 28.06 kg/m  as calculated from the following:    Height as of this encounter: 1.83 m (6' 0.05\").    Weight as of this encounter: 94 kg (207 lb 3.2 oz).  Physical Exam  GENERAL: alert and no distress  EYES: Eyes grossly normal to inspection, PERRL and conjunctivae and sclerae normal  HENT: ear canals and TM's normal, nose and mouth without ulcers or lesions  NECK: no adenopathy, no asymmetry, masses, or scars  RESP: lungs clear to auscultation - no rales, rhonchi or wheezes  CV: regular rate and rhythm, normal S1 S2, no S3 or S4, no murmur, click or rub, no peripheral edema  ABDOMEN: soft, nontender, no hepatosplenomegaly, no masses and bowel sounds normal  MS: no gross musculoskeletal defects noted, no edema  SKIN: no suspicious lesions or rashes  NEURO: Normal strength and tone, mentation intact and speech normal  PSYCH: mentation appears normal, affect normal/bright    No results for input(s): \"HGB\", \"PLT\", \"INR\", \"NA\", \"POTASSIUM\", \"CR\", \"A1C\" in the last 80192 hours.     Diagnostics  No labs were ordered during this visit.   EKG: sinus bradycardia, normal axis, normal intervals, no acute ST/T changes c/w ischemia, no LVH by voltage criteria, " unchanged from previous tracings    Revised Cardiac Risk Index (RCRI)  The patient has the following serious cardiovascular risks for perioperative complications:   - No serious cardiac risks = 0 points     RCRI Interpretation: 0 points: Class I (very low risk - 0.4% complication rate)         Signed Electronically by: TEA MEDINA  Copy of this evaluation report is provided to requesting physician.

## 2024-05-29 ENCOUNTER — PATIENT OUTREACH (OUTPATIENT)
Dept: SURGERY | Facility: CLINIC | Age: 64
End: 2024-05-29
Payer: COMMERCIAL

## 2024-05-29 ENCOUNTER — TELEPHONE (OUTPATIENT)
Dept: SURGERY | Facility: CLINIC | Age: 64
End: 2024-05-29
Payer: COMMERCIAL

## 2024-05-29 RX ORDER — HYDRALAZINE HYDROCHLORIDE 20 MG/ML
2.5-5 INJECTION INTRAMUSCULAR; INTRAVENOUS EVERY 10 MIN PRN
Status: CANCELLED | OUTPATIENT
Start: 2024-05-29

## 2024-05-29 RX ORDER — DIPHENHYDRAMINE HYDROCHLORIDE 50 MG/ML
25 INJECTION INTRAMUSCULAR; INTRAVENOUS EVERY 6 HOURS PRN
Status: CANCELLED | OUTPATIENT
Start: 2024-05-29

## 2024-05-29 RX ORDER — DEXAMETHASONE SODIUM PHOSPHATE 10 MG/ML
4 INJECTION, SOLUTION INTRAMUSCULAR; INTRAVENOUS
Status: CANCELLED | OUTPATIENT
Start: 2024-05-29

## 2024-05-29 RX ORDER — ACETAMINOPHEN 325 MG/1
975 TABLET ORAL ONCE
Status: CANCELLED | OUTPATIENT
Start: 2024-05-29 | End: 2024-05-29

## 2024-05-29 RX ORDER — KETOROLAC TROMETHAMINE 30 MG/ML
15 INJECTION, SOLUTION INTRAMUSCULAR; INTRAVENOUS
Status: CANCELLED | OUTPATIENT
Start: 2024-05-29

## 2024-05-29 RX ORDER — OXYCODONE HYDROCHLORIDE 5 MG/1
10 TABLET ORAL
Status: CANCELLED | OUTPATIENT
Start: 2024-05-29

## 2024-05-29 RX ORDER — HYDROMORPHONE HYDROCHLORIDE 1 MG/ML
0.2 INJECTION, SOLUTION INTRAMUSCULAR; INTRAVENOUS; SUBCUTANEOUS EVERY 5 MIN PRN
Status: CANCELLED | OUTPATIENT
Start: 2024-05-29

## 2024-05-29 RX ORDER — LORAZEPAM 2 MG/ML
.5-1 INJECTION INTRAMUSCULAR
Status: CANCELLED | OUTPATIENT
Start: 2024-05-29

## 2024-05-29 RX ORDER — MEPERIDINE HYDROCHLORIDE 25 MG/ML
12.5 INJECTION INTRAMUSCULAR; INTRAVENOUS; SUBCUTANEOUS EVERY 5 MIN PRN
Status: CANCELLED | OUTPATIENT
Start: 2024-05-29

## 2024-05-29 RX ORDER — ONDANSETRON 4 MG/1
4 TABLET, ORALLY DISINTEGRATING ORAL EVERY 30 MIN PRN
Status: CANCELLED | OUTPATIENT
Start: 2024-05-29

## 2024-05-29 RX ORDER — HYDROMORPHONE HYDROCHLORIDE 1 MG/ML
0.4 INJECTION, SOLUTION INTRAMUSCULAR; INTRAVENOUS; SUBCUTANEOUS EVERY 5 MIN PRN
Status: CANCELLED | OUTPATIENT
Start: 2024-05-29

## 2024-05-29 RX ORDER — FENTANYL CITRATE 50 UG/ML
50 INJECTION, SOLUTION INTRAMUSCULAR; INTRAVENOUS EVERY 5 MIN PRN
Status: CANCELLED | OUTPATIENT
Start: 2024-05-29

## 2024-05-29 RX ORDER — ONDANSETRON 2 MG/ML
4 INJECTION INTRAMUSCULAR; INTRAVENOUS EVERY 30 MIN PRN
Status: CANCELLED | OUTPATIENT
Start: 2024-05-29

## 2024-05-29 RX ORDER — LIDOCAINE 40 MG/G
CREAM TOPICAL
Status: CANCELLED | OUTPATIENT
Start: 2024-05-29

## 2024-05-29 RX ORDER — FENTANYL CITRATE 50 UG/ML
25 INJECTION, SOLUTION INTRAMUSCULAR; INTRAVENOUS
Status: CANCELLED | OUTPATIENT
Start: 2024-05-29

## 2024-05-29 RX ORDER — NALOXONE HYDROCHLORIDE 0.4 MG/ML
0.1 INJECTION, SOLUTION INTRAMUSCULAR; INTRAVENOUS; SUBCUTANEOUS
Status: CANCELLED | OUTPATIENT
Start: 2024-05-29

## 2024-05-29 RX ORDER — SODIUM CHLORIDE, SODIUM LACTATE, POTASSIUM CHLORIDE, CALCIUM CHLORIDE 600; 310; 30; 20 MG/100ML; MG/100ML; MG/100ML; MG/100ML
INJECTION, SOLUTION INTRAVENOUS CONTINUOUS
Status: CANCELLED | OUTPATIENT
Start: 2024-05-29

## 2024-05-29 RX ORDER — LABETALOL HYDROCHLORIDE 5 MG/ML
10 INJECTION, SOLUTION INTRAVENOUS
Status: CANCELLED | OUTPATIENT
Start: 2024-05-29

## 2024-05-29 RX ORDER — OXYCODONE HYDROCHLORIDE 5 MG/1
5 TABLET ORAL
Status: CANCELLED | OUTPATIENT
Start: 2024-05-29

## 2024-05-29 RX ORDER — DIPHENHYDRAMINE HCL 25 MG
25 CAPSULE ORAL EVERY 6 HOURS PRN
Status: CANCELLED | OUTPATIENT
Start: 2024-05-29

## 2024-05-29 RX ORDER — FENTANYL CITRATE 50 UG/ML
25 INJECTION, SOLUTION INTRAMUSCULAR; INTRAVENOUS EVERY 5 MIN PRN
Status: CANCELLED | OUTPATIENT
Start: 2024-05-29

## 2024-05-29 RX ORDER — ALBUTEROL SULFATE 0.83 MG/ML
2.5 SOLUTION RESPIRATORY (INHALATION) EVERY 4 HOURS PRN
Status: CANCELLED | OUTPATIENT
Start: 2024-05-29

## 2024-05-29 NOTE — PROGRESS NOTES
05/29/24    3:55 PM      Spoke with the patient and he is scheduled for a hernia repair with Dr. Garza on 5/30.  He has developed a head cold.  He reports rhinorrhea and a slight cough.  He doesn't believe that he is running a fever.  Discussed with the patient that he should postpone his elective procedure until the symptoms resolve.  He is aware that he will need an updated H&P prior to surgery.

## 2024-05-29 NOTE — TELEPHONE ENCOUNTER
Spoke with patient, who says that he's scheduled for surgery with Dr. Garza tomorrow morning. He has a cold, and so he's wondering if he should still come in for surgery tomorrow.    Transferred call to Dr. Garza's RNCC phone number [phone].    RNCC ADAN Norwood, says that she called patient back and asked him to call her back to discuss.    Jennifer Villatoro  Shy-op Coordinator  [place]  Direct Phone: [phone]

## 2024-05-30 NOTE — TELEPHONE ENCOUNTER
Left voicemail for patient regarding rescheduling surgery with Dr. Garza. Patient needed to reschedule due to an illness.     Provided contact number to discuss.    P: [phone]    __    Lizette Noguera, on 5/30/2024 at 11:00 AM

## 2024-06-05 NOTE — TELEPHONE ENCOUNTER
Vm msg received from patient, returning call to reschedule canceled surgery with Dr. Garza.     Patient requested return call on mobile or work number.    Jennifer Villatoro  Shy-op Coordinator  [place]  Direct Phone: [phone]

## 2024-06-06 NOTE — TELEPHONE ENCOUNTER
Left voicemail for patient regarding rescheduling surgery with Dr. Garza.    Provided contact number to discuss.    P: [phone]    __    Lizette Noguera, on 6/6/2024 at 11:02 AM

## 2024-06-07 NOTE — TELEPHONE ENCOUNTER
Patient is rescheduled for surgery with Dr. Garza    Spoke with: Bill    Date of Surgery: 7/29/2024    Location: [facility]    Informed patient they will need an adult : Yes    Pre op with Provider n/a    H&P: Scheduled with [facility]    Additional imaging/appointments: n/a    Surgery packet: Patient already received a packet     Additional comments: n/a        Lizette Noguera on 6/7/2024 at 10:43 AM

## 2024-06-07 NOTE — TELEPHONE ENCOUNTER
Left voicemail for patient regarding rescheduling surgery with Dr. Garza.    Provided contact number to discuss.    P: [phone]    __    Lizette Noguera, on 6/7/2024 at 9:40 AM

## 2024-06-25 ENCOUNTER — PRE VISIT (OUTPATIENT)
Dept: UROLOGY | Facility: CLINIC | Age: 64
End: 2024-06-25
Payer: COMMERCIAL

## 2024-06-25 DIAGNOSIS — R97.20 ELEVATED PROSTATE SPECIFIC ANTIGEN (PSA): Primary | ICD-10-CM

## 2024-06-25 NOTE — TELEPHONE ENCOUNTER
Reason for visit: prostate bx-- 3 targets     Dx/Hx/Sx: elevated PSA    Records/imaging/labs/orders: in epic     At Rooming: go over prep- consent - grab lido- grab gent- print AVS    Called patient to go over prep for biopsy including:    No blood thinning medications for 7 days before procedure, including 325 mg aspirin, anticoagulants, ibuprofen and fish oil.     prophylactic antibiotics sent to primary pharmacy listed in chart:   -take the day before, the day of and the day after the procedure, one tablet, two times daily.    Complete a Fleets enema approximately 2 hours before the scheduled procedure.    Do not come to the appointment fasted.

## 2024-06-28 RX ORDER — CIPROFLOXACIN 500 MG/1
500 TABLET, FILM COATED ORAL 2 TIMES DAILY
Qty: 6 TABLET | Refills: 0 | Status: SHIPPED | OUTPATIENT
Start: 2024-07-01 | End: 2024-07-04

## 2024-06-28 RX ORDER — CEFTRIAXONE SODIUM 1 G
1 VIAL (EA) INJECTION ONCE
Status: DISCONTINUED | OUTPATIENT
Start: 2024-06-28 | End: 2024-07-02

## 2024-07-01 ENCOUNTER — TELEPHONE (OUTPATIENT)
Dept: ONCOLOGY | Facility: CLINIC | Age: 64
End: 2024-07-01
Payer: COMMERCIAL

## 2024-07-01 NOTE — TELEPHONE ENCOUNTER
----- Message from Manny Dominique sent at 7/1/2024 11:33 AM CDT -----    He should start the Cipro this afternoon. As long as it's 81 mg aspirin, it's still fine for us to proceed.    Brian  ----- Message -----  From: Paz Arenas  Sent: 7/1/2024  10:27 AM CDT  To: Manny Dominique MD; #      ----- Message -----  From: Paz Arenas  Sent: 7/1/2024  10:21 AM CDT  To: Manny Dominique MD; #    Good morning,     Patient called about tomorrows procedure. Pt was in the [facility] harry and did not see any pre-op information until yesterday when he came back. Pt did sent in a MyChart with questions as well.     Pt did not start Cipro and did take aspirin, so he is wondering if he can still have procedure tomorrow.     Can someone please reach out to patient to confirm / reschedule. Thank you.     Paz Arenas on 7/1/2024 at 10:19 AM

## 2024-07-01 NOTE — TELEPHONE ENCOUNTER
Patient called about appt on 7/2. Pt is having in clinic procedure with Dr. Dominique. Pt was in the [facility] and did not have access to pre-op instruction and therefore did not take his Cipro and was still taking aspirin. Writer sent high priority staff message to Dr. Dominique and ADAN Simpson for them to reach out to patient directly. Paz Arenas on 7/1/2024 at 10:25 AM

## 2024-07-02 ENCOUNTER — OFFICE VISIT (OUTPATIENT)
Dept: UROLOGY | Facility: CLINIC | Age: 64
End: 2024-07-02
Payer: COMMERCIAL

## 2024-07-02 VITALS
HEIGHT: 72 IN | OXYGEN SATURATION: 96 % | BODY MASS INDEX: 28.04 KG/M2 | WEIGHT: 207 LBS | HEART RATE: 64 BPM | SYSTOLIC BLOOD PRESSURE: 133 MMHG | DIASTOLIC BLOOD PRESSURE: 82 MMHG

## 2024-07-02 DIAGNOSIS — R97.20 ELEVATED PROSTATE SPECIFIC ANTIGEN (PSA): Primary | ICD-10-CM

## 2024-07-02 PROCEDURE — 55700 PR BIOPSY OF PROSTATE,NEEDLE/PUNCH: CPT | Performed by: UROLOGY

## 2024-07-02 PROCEDURE — 88305 TISSUE EXAM BY PATHOLOGIST: CPT | Mod: TC | Performed by: UROLOGY

## 2024-07-02 PROCEDURE — G0416 PROSTATE BIOPSY, ANY MTHD: HCPCS | Mod: 26 | Performed by: PATHOLOGY

## 2024-07-02 ASSESSMENT — PAIN SCALES - GENERAL: PAINLEVEL: NO PAIN (0)

## 2024-07-02 NOTE — PATIENT INSTRUCTIONS
[place] [facility]  Precautions Following a Prostate Biopsy    There are four conditions that you should watch for after a prostate biopsy:    Excessive pain  Bleeding irregularities (passing numerous  dime sized  clots or if your urine looks like cranberry juice)  Fever of 100 degrees or more  If you are unable to urinate      If any of these occur, call the Urology Clinic during normal business hours (M-F, 8:00-4:30) at [phone].  If you experience a problem after normal business hours, call our 24-hour phone number at [phone] and ask for the Urology Resident on call to be paged.    If you experience any discomfort following the biopsy, you may take Tylenol.  DO NOT TAKE ASPIRIN unless specified by your physician.   If the discomfort becomes severe or uncontrolled by medication, contact the Urology Clinic or Urology Resident (after normal business hours).    Do not be alarmed if you have some blood in your stool, in your urine, or ejaculate (semen).  This occurrence is normal and may last up to three (3) or four (4) days, usually intermittently.  Blood in the ejaculate (semen) may last several weeks, up to about a dozen ejaculations.  The blood in your ejaculate may appear as brown streaks, blood tinged, and immediately following a biopsy, it may appear bright red.    If you run a fever above 100 degrees, call the Urology Clinic or Urology Resident (after normal business hours) immediately.  If you are unable to reach your physician or the Resident on call, go to the nearest emergency room.  Explain that you have had a transrectal biopsy of your prostate and what problems you are experiencing.      You should attempt to urinate following your biopsy before you leave the clinic.  If you are unable to urinate four (4) to six (6) hours after you leave the clinic, you will need to contact the Urology Clinic or the Resident on call.  If you are unable to reach your physician or the  Resident on call, go to the nearest emergency room.            If you have any questions or concerns after your biopsy, feel free to contact the Urology Clinic at [phone] during M-F, 8:00-5pm business hours.  If you need to speak with someone after normal business hours, call [phone] and ask for the Resident on call to be paged.

## 2024-07-02 NOTE — LETTER
7/2/2024       RE: Forest Sandoval  [address] [facility] [address]     Dear Colleague,    Thank you for referring your patient, Forest Sand[place], to the [facility] [place] [facility]. Please see a copy of my visit note below.    PREPROCEDURE DIAGNOSIS: Elevated PSA  POSTPROCEDURE DIAGNOSIS: Elevated PSA.   PROCEDURE: Transrectal ultrasound sizing and transrectal ultrasound guided prostatic needle biopsy, including MRI fusion targeting  for Fabrizio [place] who is a 63 year old male. PSA 5.46.  SURGEON: Manny Dominique  ANESTHESIA: 5 mL of 1% periprostatic block bilaterally   We previously obtained an MRI of the prostate and identified all PIRADS 3-5 lesions for targeting    Target Lesion #1 PIRADS 4 - right peripheral zone  Target Lesion #2 PIRADS 3 - right mid gland transition zone  Target Lesion #3 PIRADS 3 - right base peripheral zone    DESCRIPTION OF PROCEDURE: The procedure, the outcome, the anesthesia, and the risks were discussed with the patient. Informed consent was obtained and signed and a timeout was completed prior to the procedure. Digital rectal examination was performed with the below findings noted. Anesthesia was administered as noted above and the transrectal ultrasound probe was inserted, sizing was performed, and the below findings were noted. Two core biopsies were taken from each of the MRI targets, and 12 core biopsies were taken as described below. The probe was then withdrawn. Patient tolerated the procedure well.   FINDINGS: Digital rectal exam reveals normal prostate. Total volume is 53 mL. Hypoechoic lesion bilaterally. US images were obtained and then fused with the MRI images.  The fused images were then used to guide the biopsy of the targeted lesions with 2 cores taken of each lesion.  We then performed random biopsies. 12 cores were taken with 6 on each side, base, mid and apex.  PLAN: Will  follow-up next week to review results.  Manny Dominique MD  Urology  [facility]

## 2024-07-02 NOTE — NURSING NOTE
Chief Complaint   Patient presents with    Prostate Biopsy       There were no vitals taken for this visit. There is no height or weight on file to calculate BMI.    Patient Active Problem List   Diagnosis    Hyperlipidemia LDL goal <130    Dermatitis    BPH (benign prostatic hyperplasia)    DJD (degenerative joint disease) of knee    Generalized anxiety disorder    Family history of ischemic heart disease    Former smoker    Umbilical hernia without obstruction and without gangrene    Snoring    Dental infection       Allergies   Allergen Reactions    Contrast Dye Other (See Comments)     LW CM1: >>> NO CONTRAST ADVERSE REACTION <<<     Reaction :    Sulfa Antibiotics Unknown    Tobramycin Unknown       Current Outpatient Medications   Medication Sig Dispense Refill    amoxicillin-clavulanate (AUGMENTIN) 875-125 MG tablet Take 1 tablet by mouth 2 times daily With daily yogurt or probiotics 14 tablet 0    calcium carbonate (TUMS) 500 MG chewable tablet Take 1 chew tab by mouth 2 times daily      chlorhexidine (PERIDEX) 0.12 % solution Swish and spit 15 mLs in mouth 2 times daily Use after oral antibiotics are finished 473 mL 0    ciprofloxacin (CIPRO) 500 MG tablet Take 1 tablet (500 mg) by mouth 2 times daily for 3 days 6 tablet 0    rosuvastatin (CRESTOR) 10 MG tablet Take 1 tablet (10 mg) by mouth daily 90 tablet 3       Social History     Tobacco Use    Smoking status: Former     Passive exposure: Past    Smokeless tobacco: Never   Vaping Use    Vaping status: Never Used   Substance Use Topics    Alcohol use: Not Currently    Drug use: No       Invasive Procedure Safety Checklist:    Procedure: MRI fusion TRUS prostate biopsy    Action: Complete sections and checkboxes as appropriate.    Pre-procedure:  1. Patient ID Verified with 2 identifiers (Janett and  or MRN) : YES    2. Procedure and site verified with patient/designee (when able) : YES    3. Accurate consent documentation in medical record : YES    4.  H&P (or appropriate assessment) documented in medical record : N/A  H&P must be up to 30 days prior to procedure an updated within 24 hours of                 Procedure as applicable.     5. Relevant diagnostic and radiology test results appropriately labeled and displayed as applicable : YES    6. Blood products, implants, devices, and/or special equipment available for the procedure as applicable : YES    7. Procedure site(s) marked with provider initials [Exclusions: none] : NO    8. Marking not required. Reason : Yes  Procedure does not require site marking    Time Out:     Time-Out performed immediately prior to starting procedure, including verbal and active participation of all team members addressing: YES    1. Correct patient identity.  2. Confirmed that the correct side and site are marked.  3. An accurate procedure to be done.  4. Agreement on the procedure to be done.  5. Correct patient position.  6. Relevant images and results are properly labeled and appropriately displayed.  7. The need to administer antibiotics or fluids for irrigation purposes during the procedure as applicable.  8. Safety precautions based on patient history or medication use.    During Procedure: Verification of correct person, site, and procedure occurs any time the responsibility for care of the patient is transferred to another member of the care team.      The following medication was given:     MEDICATION:  Lidocaine without epinephrine 1%  ROUTE: administered by physician - prostate nerve block   SITE: administered by physician - prostate nerve block    DOSE: 10 mL  LOT #: [id number]  : Culture Jam  EXPIRATION DATE: 05/27  NDC#: 12777-452-33   Was there drug waste? Yes  Amount of drug waste (mL): 20 mL.  Reason for waste:  Single use vial    Prior to injection, verified patient identity using patient's name and date of birth.  Due to injection administration, patient instructed to remain in clinic for 15 minutes   afterwards, and to report any adverse reaction to me immediately.    Drug Amount Wasted:  Yes: 20 mL (10 mg/ml )  Vial/Syringe: Single dose vial    Marly Scott  7/2/2024  1:03 PM

## 2024-07-02 NOTE — PROGRESS NOTES
PREPROCEDURE DIAGNOSIS: Elevated PSA  POSTPROCEDURE DIAGNOSIS: Elevated PSA.   PROCEDURE: Transrectal ultrasound sizing and transrectal ultrasound guided prostatic needle biopsy, including MRI fusion targeting  for Fabrizio Sandoval who is a 63 year old male. PSA 5.46.  SURGEON: Manny Dominique  ANESTHESIA: 5 mL of 1% periprostatic block bilaterally   We previously obtained an MRI of the prostate and identified all PIRADS 3-5 lesions for targeting    Target Lesion #1 PIRADS 4 - right peripheral zone  Target Lesion #2 PIRADS 3 - right mid gland transition zone  Target Lesion #3 PIRADS 3 - right base peripheral zone    DESCRIPTION OF PROCEDURE: The procedure, the outcome, the anesthesia, and the risks were discussed with the patient. Informed consent was obtained and signed and a timeout was completed prior to the procedure. Digital rectal examination was performed with the below findings noted. Anesthesia was administered as noted above and the transrectal ultrasound probe was inserted, sizing was performed, and the below findings were noted. Two core biopsies were taken from each of the MRI targets, and 12 core biopsies were taken as described below. The probe was then withdrawn. Patient tolerated the procedure well.   FINDINGS: Digital rectal exam reveals normal prostate. Total volume is 53 mL. Hypoechoic lesion bilaterally. US images were obtained and then fused with the MRI images.  The fused images were then used to guide the biopsy of the targeted lesions with 2 cores taken of each lesion.  We then performed random biopsies. 12 cores were taken with 6 on each side, base, mid and apex.  PLAN: Will follow-up next week to review results.  Manny Dominique MD  Urology  [facility]

## 2024-07-03 ENCOUNTER — PRE VISIT (OUTPATIENT)
Dept: UROLOGY | Facility: CLINIC | Age: 64
End: 2024-07-03
Payer: COMMERCIAL

## 2024-07-03 LAB
PATH REPORT.COMMENTS IMP SPEC: ABNORMAL
PATH REPORT.COMMENTS IMP SPEC: ABNORMAL
PATH REPORT.COMMENTS IMP SPEC: YES
PATH REPORT.FINAL DX SPEC: ABNORMAL
PATH REPORT.GROSS SPEC: ABNORMAL
PATH REPORT.RELEVANT HX SPEC: ABNORMAL
PHOTO IMAGE: ABNORMAL

## 2024-07-03 NOTE — TELEPHONE ENCOUNTER
Reason for visit: bx results      Dx/Hx/Sx: elevated PSA    Records/imaging/labs/orders: in epic     At Rooming: virtual visit

## 2024-07-09 ENCOUNTER — VIRTUAL VISIT (OUTPATIENT)
Dept: UROLOGY | Facility: CLINIC | Age: 64
End: 2024-07-09
Payer: COMMERCIAL

## 2024-07-09 DIAGNOSIS — C61 PROSTATE CANCER (H): Primary | ICD-10-CM

## 2024-07-09 PROCEDURE — 99215 OFFICE O/P EST HI 40 MIN: CPT | Mod: 95 | Performed by: UROLOGY

## 2024-07-09 ASSESSMENT — PAIN SCALES - GENERAL: PAINLEVEL: NO PAIN (0)

## 2024-07-09 NOTE — LETTER
7/9/2024       RE: Forest Sandoval  [address] [address] [address]     Dear Colleague,    Thank you for referring your patient, Forest Sandoval, to the [facility] [place] at [facility]. Please see a copy of my visit note below.    Forest Sandoval is a 63 year old male who is being evaluated via a billable video visit.      How would you like to obtain your AVS? MyChart  If the video visit is dropped, the invitation should be resent by: Text to cell phone: [phone]  Will anyone else be joining your video visit? No    Video Start Time: 8:37 AM    CHIEF COMPLAINT   It was my pleasure to see Forest Sandoval who is a 63 year old male for follow-up of Prostate Cancer.      HPI   Forest Sandoval is a very pleasant 63 year old male who presents with a history of Prostate Cancer. PSA 5.46. Found to have PIRADS 4 on MRI, and now with biopsy demonstrating Gerry 3+4=7 prostate cancer. No complications from biopsy. At baseline, he notes he does not have good erectile function and has some Peyronie's disease.    Of note, he had some bladder thickening noted on MRI, but cystoscopy with Dr. Argueta demonstrated no suspicious lesions.     PSA 5.46.    TRUS Biopsy 7/2/2024  Final Diagnosis   A. target 1:  -Benign prostate tissue     B. target 2:  -Prostatic adenocarcinoma, grade group 2 ([place] score 3+4), pattern 4: 30%  Involving 2 of 2 cores, 30% of the tissue     C. target 3:  -Benign prostate tissue     D. R base:  -Benign prostate tissue     E. R mid:  -Benign prostate tissue     F. R apex:  -Prostatic adenocarcinoma, grade group 2 (Gerry score 3+4), pattern 4: 30%  Involving 1 of 2 cores, 20% of the tissue     G. L base:  -Benign prostate tissue     H. L mid:  -Benign prostate tissue     I. L apex:  -Benign prostate tissue     MRI Prostate 3/15/2024  Size: 4.7 x 3.6 x 4.9 cm; 43 grams   IMPRESSION:  1. Based on the most  suspicious abnormality, this exam is  characterized as PIRADS 4 - Clinically significant cancer is likely to  be present.  The most suspicious abnormality is located at the right  peripheral zone and there is short segment capsular abutment with low  likelihood of minimal extraprostatic extension.  2. No bulky adenopathy.  3. Asymmetric plaque-like thickening along right posterolateral  urinary bladder wall, consider cystoscopy or CT urogram as clinically  desired for further evaluation.         Allergies:   Contrast dye, Sulfa antibiotics, and Tobramycin         Review of Systems:  From intake questionnaire     Skin: negative  Eyes: negative  Ears/Nose/Throat: negative  Respiratory: No shortness of breath, dyspnea on exertion, cough, or hemoptysis  Cardiovascular: No chest pain or palpitations  Gastrointestinal: negative; no nausea/vomiting, constipation or diarrhea  Genitourinary: as per HPI  Musculoskeletal: negative  Neurologic: negative  Psychiatric: negative  Hematologic/Lymphatic/Immunologic: negative  Endocrine: negative         Physical Exam:     Vitals:  No vitals were obtained today due to virtual visit.    Physical Exam   EYES: Eyes grossly normal to inspection.  No discharge or erythema, or obvious scleral/conjunctival abnormalities.  SKIN: Visible skin clear. No significant rash, abnormal pigmentation or lesions.  NEURO: Cranial nerves grossly intact.  Mentation and speech appropriate for age.  GENERAL: Healthy, alert and no distress  RESP: No audible wheeze, cough, or visible cyanosis.  No visible retractions or increased work of breathing.    PSYCH: Mentation appears normal, affect normal/bright, judgement and insight intact, normal speech and appearance well-groomed.      Outside and Past Medical records:    Review of the result(s) of each unique test - PSA, MRI, pathology         Assessment and Plan:     64 year old male with new diagnosis of Gerry 3+4=7 prostate cancer, PSA 5.46, PIRADS 4 on  MRI. We had an extensive discussion about the significance of localized, prostate cancer. We discussed the options for treatment of a localized prostate cancer including active surveillance, brachytherapy, external beam radiation therapy, and surgical removal. We discussed that based on his [place] score, age, and overall excellent health, he would not be an ideal candidate for active surveillance.       We discussed the relative merits of robotic assisted radical prostatectomy. We also discussed the advantages and disadvantages and roles of open surgery vs. laparoscopic (and Da Erika assisted) surgery. The anticipated post-operative course was explained, including an anticipated 1-2 day hospital stay. Catheter will remain in place 7-10 days.     The risks, benefits, alternatives, and personnel involved with robotic prostatectomy were discussed in detail. Specifically, we discussed that risks include but are not limited to anesthetic complications including stroke, MI, DVT/PE, as well as risk of bleeding requiring transfusion, bowel injury, infection, lymphocele, ureteral injury, nerve injury,urine leak and other potentially unforseen complications. Also discussed the risk of bladder neck contracture and other urinary symptoms after procedure. In addition, we discussed risk long-term of urinary incontinence and erectile dysfunction following the procedure. Finally, we discussed risk for adverse pathologic features, including positive surgical margins and potential for post-surgical radiation, hormone ablation and long-term need for PSA monitoring.       We discussed that there was no clear evidence of advantage between surgery and radiation with regard to risk of recurrence. Regarding radiation, we discussed risks including but not limited to cancer recurrence, exacerbation of voiding symptoms or hematuria, urinary retention, incontinence, stricture of the urinary tract, induction of second malignancy, and risks of  rectal symptoms or bleeding.  We discussed fact that rectal symptoms may be more common with radiation than with other treatment modalities. With radiation therapy, we also discussed the difficulties in diagnosing recurrent disease at an early stage due to variability in PSA levels and the fact that most patients are not candidates for local salvage therapy when biochemical recurrence is declared.  We also discussed the significant morbidity of post-radiation local salvage therapy in terms of perioperative complications, erectile dysfunction and urinary incontinence. With surgery, we also discussed the potential advantage over radiation therapy in that biochemical recurrence can be detected at a relatively earlier stage and that salvage radiotherapy is successful in controlling recurrent disease in a substantial proportion of patients.  We also discussed that salvage radiotherapy was associated with a considerably more favorable morbidity profile compared to local salvage therapies for recurrent disease post-radiation.     We discussed option for further discussion with radiation oncology and he is interested in learning more. Radiation oncology referral placed.      We also reviewed the notion of focal therapy, and discussed some of the pros and cons of this today. We reviewed that our option at [facility] is the VAPOR 2 trial. We discussed the concept of this trial, and that given his pathology, PSA, and imaging findings, he appears to be a candidate. He is interested in learning more, and will have the research team contact him.     Plan:  Radiation oncology consultation  Will contact about information related to VAPOR 2 trial  Follow-up to discuss    Orders  Orders Placed This Encounter   Procedures    Radiation Therapy Referral     Video-Visit Details    Type of service:  Video Visit    Video End Time:9:18 AM    Originating Location (pt. Location): Home    Distant Location (provider location):  On-site    Platform  used for Video Visit: Mckayla    41 minutes spent on the date of the encounter including direct interaction with the patient, performing chart review, history and exam, documentation and further activities as noted above.    Manny Dominique MD  Urology  [facility]

## 2024-07-09 NOTE — NURSING NOTE
Is the patient currently in the state of [place]? YES    Visit mode:VIDEO    If the visit is dropped, the patient can be reconnected by: VIDEO VISIT: Send to e-mail at: [email or web address]    Will anyone else be joining the visit? NO  (If patient encounters technical issues they should call [phone]    How would you like to obtain your AVS? MyChart    Are changes needed to the allergy or medication list? No    Are refills needed on medications prescribed by this physician? NO    Reason for visit: Follow Up    Samara ALEXANDER

## 2024-07-09 NOTE — PROGRESS NOTES
Forest Sandoval is a 63 year old male who is being evaluated via a billable video visit.      How would you like to obtain your AVS? MyChart  If the video visit is dropped, the invitation should be resent by: Text to cell phone: [phone]  Will anyone else be joining your video visit? No    Video Start Time: 8:37 AM    CHIEF COMPLAINT   It was my pleasure to see Forest Sandoval who is a 63 year old male for follow-up of Prostate Cancer.      HPI   Forest Sandoval is a very pleasant 63 year old male who presents with a history of Prostate Cancer. PSA 5.46. Found to have PIRADS 4 on MRI, and now with biopsy demonstrating Gerry 3+4=7 prostate cancer. No complications from biopsy. At baseline, he notes he does not have good erectile function and has some Peyronie's disease.    Of note, he had some bladder thickening noted on MRI, but cystoscopy with Dr. Argueta demonstrated no suspicious lesions.     PSA 5.46.    TRUS Biopsy 7/2/2024  Final Diagnosis   A. target 1:  -Benign prostate tissue     B. target 2:  -Prostatic adenocarcinoma, grade group 2 ([place] score 3+4), pattern 4: 30%  Involving 2 of 2 cores, 30% of the tissue     C. target 3:  -Benign prostate tissue     D. R base:  -Benign prostate tissue     E. R mid:  -Benign prostate tissue     F. R apex:  -Prostatic adenocarcinoma, grade group 2 ([place] score 3+4), pattern 4: 30%  Involving 1 of 2 cores, 20% of the tissue     G. L base:  -Benign prostate tissue     H. L mid:  -Benign prostate tissue     I. L apex:  -Benign prostate tissue     MRI Prostate 3/15/2024  Size: 4.7 x 3.6 x 4.9 cm; 43 grams   IMPRESSION:  1. Based on the most suspicious abnormality, this exam is  characterized as PIRADS 4 - Clinically significant cancer is likely to  be present.  The most suspicious abnormality is located at the right  peripheral zone and there is short segment capsular abutment with low  likelihood of minimal extraprostatic extension.  2. No bulky  adenopathy.  3. Asymmetric plaque-like thickening along right posterolateral  urinary bladder wall, consider cystoscopy or CT urogram as clinically  desired for further evaluation.         Allergies:   Contrast dye, Sulfa antibiotics, and Tobramycin         Review of Systems:  From intake questionnaire     Skin: negative  Eyes: negative  Ears/Nose/Throat: negative  Respiratory: No shortness of breath, dyspnea on exertion, cough, or hemoptysis  Cardiovascular: No chest pain or palpitations  Gastrointestinal: negative; no nausea/vomiting, constipation or diarrhea  Genitourinary: as per HPI  Musculoskeletal: negative  Neurologic: negative  Psychiatric: negative  Hematologic/Lymphatic/Immunologic: negative  Endocrine: negative         Physical Exam:     Vitals:  No vitals were obtained today due to virtual visit.    Physical Exam   EYES: Eyes grossly normal to inspection.  No discharge or erythema, or obvious scleral/conjunctival abnormalities.  SKIN: Visible skin clear. No significant rash, abnormal pigmentation or lesions.  NEURO: Cranial nerves grossly intact.  Mentation and speech appropriate for age.  GENERAL: Healthy, alert and no distress  RESP: No audible wheeze, cough, or visible cyanosis.  No visible retractions or increased work of breathing.    PSYCH: Mentation appears normal, affect normal/bright, judgement and insight intact, normal speech and appearance well-groomed.      Outside and Past Medical records:    Review of the result(s) of each unique test - PSA, MRI, pathology         Assessment and Plan:     64 year old male with new diagnosis of [place]ver[place] 3+4=7 prostate cancer, PSA 5.46, PIRADS 4 on MRI. We had an extensive discussion about the significance of localized, prostate cancer. We discussed the options for treatment of a localized prostate cancer including active surveillance, brachytherapy, external beam radiation therapy, and surgical removal. We discussed that based on his Laver[place] score, age, and  overall excellent health, he would not be an ideal candidate for active surveillance.       We discussed the relative merits of robotic assisted radical prostatectomy. We also discussed the advantages and disadvantages and roles of open surgery vs. laparoscopic (and Da Erika assisted) surgery. The anticipated post-operative course was explained, including an anticipated 1-2 day hospital stay. Catheter will remain in place 7-10 days.     The risks, benefits, alternatives, and personnel involved with robotic prostatectomy were discussed in detail. Specifically, we discussed that risks include but are not limited to anesthetic complications including stroke, MI, DVT/PE, as well as risk of bleeding requiring transfusion, bowel injury, infection, lymphocele, ureteral injury, nerve injury,urine leak and other potentially unforseen complications. Also discussed the risk of bladder neck contracture and other urinary symptoms after procedure. In addition, we discussed risk long-term of urinary incontinence and erectile dysfunction following the procedure. Finally, we discussed risk for adverse pathologic features, including positive surgical margins and potential for post-surgical radiation, hormone ablation and long-term need for PSA monitoring.       We discussed that there was no clear evidence of advantage between surgery and radiation with regard to risk of recurrence. Regarding radiation, we discussed risks including but not limited to cancer recurrence, exacerbation of voiding symptoms or hematuria, urinary retention, incontinence, stricture of the urinary tract, induction of second malignancy, and risks of rectal symptoms or bleeding.  We discussed fact that rectal symptoms may be more common with radiation than with other treatment modalities. With radiation therapy, we also discussed the difficulties in diagnosing recurrent disease at an early stage due to variability in PSA levels and the fact that most patients  are not candidates for local salvage therapy when biochemical recurrence is declared.  We also discussed the significant morbidity of post-radiation local salvage therapy in terms of perioperative complications, erectile dysfunction and urinary incontinence. With surgery, we also discussed the potential advantage over radiation therapy in that biochemical recurrence can be detected at a relatively earlier stage and that salvage radiotherapy is successful in controlling recurrent disease in a substantial proportion of patients.  We also discussed that salvage radiotherapy was associated with a considerably more favorable morbidity profile compared to local salvage therapies for recurrent disease post-radiation.     We discussed option for further discussion with radiation oncology and he is interested in learning more. Radiation oncology referral placed.      We also reviewed the notion of focal therapy, and discussed some of the pros and cons of this today. We reviewed that our option at [facility] is the VAPOR 2 trial. We discussed the concept of this trial, and that given his pathology, PSA, and imaging findings, he appears to be a candidate. He is interested in learning more, and will have the research team contact him.     Plan:  Radiation oncology consultation  Will contact about information related to VAPOR 2 trial  Follow-up to discuss    Orders  Orders Placed This Encounter   Procedures    Radiation Therapy Referral     Video-Visit Details    Type of service:  Video Visit    Video End Time:9:18 AM    Originating Location (pt. Location): Home    Distant Location (provider location):  On-site    Platform used for Video Visit: [employer]    41 minutes spent on the date of the encounter including direct interaction with the patient, performing chart review, history and exam, documentation and further activities as noted above.    Manny Dominique MD  Urology  [facility]

## 2024-07-13 PROBLEM — C61 PROSTATE CANCER (H): Status: ACTIVE | Noted: 2024-07-13

## 2024-07-19 ENCOUNTER — TELEPHONE (OUTPATIENT)
Dept: UROLOGY | Facility: CLINIC | Age: 64
End: 2024-07-19
Payer: COMMERCIAL

## 2024-07-19 NOTE — TELEPHONE ENCOUNTER
Patient confirmed scheduled appointment:  Date: Oct 22nd  Time: 9:30am  Visit type: Return   Provider: Dr. Dominique   Location: [facility]  Additional notes: assist in getting this patient scheduled for a follow up with Dr. Dominique sometime after 8/5  - per staff message

## 2024-07-24 NOTE — TELEPHONE ENCOUNTER
This writer received a staff message that this patient needs to schedule his H&P with his PCP for surgery with Dr. Garza on 7/29. This writer called and left a voicemail to remind the patient to get that scheduled and if he needs help please call [phone].    Lizette Noguera on 7/24/2024 at 3:50 PM

## 2024-07-26 NOTE — TELEPHONE ENCOUNTER
This writer received another message that this patient can not be reached and did not have his H&P for surgery with Dr. Garza on 7/29/2024. This writer called and left the patient a voicemail that surgery for 7/29/2024 with Dr. Garza has been cancelled and to call [phone] with any questions.    Lizette Noguera on 7/26/2024 at 12:19 PM

## 2024-07-29 ENCOUNTER — TELEPHONE (OUTPATIENT)
Dept: SURGERY | Facility: CLINIC | Age: 64
End: 2024-07-29
Payer: COMMERCIAL

## 2024-07-29 NOTE — TELEPHONE ENCOUNTER
Received voicemail from patient on 7/28    Patient states he is very disappointed surgery has to be cancelled, he states he notified the team he will have spotty phone service     States he had a pre-op before his surgery originally scheduled in May, and is very disappointed as he has had no changes in his health    He states again that he is very disappointed about this, and requests a call back to discus at [phone]  __    Karma Holt on 7/29/2024 at 4:46 PM

## 2024-07-30 NOTE — TELEPHONE ENCOUNTER
24    9:39 AM     Spoke with the patient and he now understands why his original surgery was canceled as his H&P had .  In light of his recent prostate cancer diagnosis  is recommending that the patient wait to repair his umbilical hernia until he undergoes cancer treatment.    The patient states that his cancer is not aggressive and he has been given several options for treatment such as radiation, surgery, or watchful waiting.  He does not believe that anything will be done until at least October.  He is requesting a telephone appointment with  to discuss scheduling umbilical hernia repair now.  Appointment arranged on  at 1130.

## 2024-08-01 ENCOUNTER — VIRTUAL VISIT (OUTPATIENT)
Dept: SURGERY | Facility: CLINIC | Age: 64
End: 2024-08-01
Payer: COMMERCIAL

## 2024-08-01 VITALS — BODY MASS INDEX: 27.9 KG/M2 | HEIGHT: 72 IN | WEIGHT: 206 LBS

## 2024-08-01 DIAGNOSIS — K42.9 UMBILICAL HERNIA WITHOUT OBSTRUCTION AND WITHOUT GANGRENE: Primary | ICD-10-CM

## 2024-08-01 PROCEDURE — 99442 PR PHYSICIAN TELEPHONE EVALUATION 11-20 MIN: CPT | Performed by: SURGERY

## 2024-08-01 RX ORDER — CEFAZOLIN SODIUM 2 G/50ML
2 SOLUTION INTRAVENOUS
OUTPATIENT
Start: 2024-08-01

## 2024-08-01 RX ORDER — CEFAZOLIN SODIUM 2 G/50ML
2 SOLUTION INTRAVENOUS SEE ADMIN INSTRUCTIONS
OUTPATIENT
Start: 2024-08-01

## 2024-08-01 ASSESSMENT — PAIN SCALES - GENERAL: PAINLEVEL: NO PAIN (0)

## 2024-08-01 NOTE — PROGRESS NOTES
Fabrizio is a 64 year old who is being evaluated via a billable telephone visit.    What phone number would you like to be contacted at? [phone]  How would you like to obtain your AVS? [place]  Originating Location (pt. Location): Home    Distant Location (provider location):  On-site  Phone call duration: 11 minutes    During this telephone visit the patient is located in [place], patient verifies this as the location during the entirety of this visit.     Patient seen previously, ready to proceed with open mesh repair umbilical hernia.  See note 5/7/24  Orders placed.  Routine pre op.

## 2024-08-01 NOTE — LETTER
8/1/2024       RE: Forest Sandoval  [address] [facility] [address]     Dear Colleague,    Thank you for referring your patient, Forest Sandoval, to the [facility] [place] at [facility]. Please see a copy of my visit note below.    Fabrizio is a 64 year old who is being evaluated via a billable telephone visit.    What phone number would you like to be contacted at? [phone]  How would you like to obtain your AVS? MyChart  Originating Location (pt. Location): Home      During this telephone visit the patient is located in [place], patient verifies this as the location during the entirety of this visit.     Patient seen previously, ready to proceed with open mesh repair umbilical hernia.  See note 5/7/24  Orders placed.  Routine pre op.      Again, thank you for allowing me to participate in the care of your patient.      Sincerely,    Elver Garza MD

## 2024-08-01 NOTE — PATIENT INSTRUCTIONS
You met with Dr. Elver Garza.      Today's visit instructions:    Plan to schedule umbilical hernia repair with Dr. Garza. Our surgery scheduler will reach out to you to get this scheduled.        If you have questions please contact Enma RN or Judi RN during regular clinic hours, Monday through Friday 7:30 AM - 4:00 PM, or you can contact us via [employer] at anytime.       If you have urgent needs after-hours, weekends, or holidays please call the hospital at [phone] and ask to speak with our on-call General Surgery Team.    Appointment [phone]  Nurse Advice (Enma or Judi): [phone]   Surgery Scheduler (Lizette): [phone]  Fax: [phone]

## 2024-08-01 NOTE — TELEPHONE ENCOUNTER
MEDICAL RECORDS REQUEST   Radiation Oncology  [address] [place] [address]  Fax: [phone]          FUTURE VISIT INFORMATION                                                   PANFILO Claudio: 1960 scheduled for future visit at [facility] Radiation Oncology    RECORDS REQUESTED FOR VISIT                                                     PROSTATE     OFFICE NOTE from urologist [facility] 24: Dr. Manny Dominique   OPERATIVE/BIOPSY REPORTS (include if prostate was removed) Epic 24: Prostate Bx   PSA LABS (within 5 years) Epic Most recent 24   MEDICATION LIST [facility]    LABS     PATHOLOGY REPORTS Report in Epic 24: [id number]    ANYTHING RELATED TO DIAGNOSIS Epic Most recent 24   IMAGING (NEED IMAGES & REPORT)     MRI PACS 3/15/24: MR Prostate

## 2024-08-01 NOTE — NURSING NOTE
Chief Complaint   Patient presents with    RECHECK     Discuss hernia repair in light of recent prostate cancer       Vitals:    08/01/24 0840   Weight: 93.4 kg (206 lb)   Height: 1.829 m (6')       Body mass index is 27.94 kg/m .                          Santino Friedman, EMT

## 2024-08-02 NOTE — TELEPHONE ENCOUNTER
Patient is rescheduled for surgery with Dr. Garza    Spoke with: Bill    Date of Surgery: 9/20/2024    Location: ASC    Informed patient they will need an adult : Yes    Pre op with Provider n/a    H&P: Scheduled with [facility]    Additional imaging/appointments: n/a    Surgery packet: To be sent via [facility]     Additional comments: n/a        Lizette Noguera on 8/2/2024 at 9:50 AM

## 2024-08-05 ENCOUNTER — OFFICE VISIT (OUTPATIENT)
Dept: RADIATION THERAPY | Facility: OUTPATIENT CENTER | Age: 64
End: 2024-08-05
Attending: UROLOGY
Payer: COMMERCIAL

## 2024-08-05 ENCOUNTER — PRE VISIT (OUTPATIENT)
Dept: RADIATION THERAPY | Facility: OUTPATIENT CENTER | Age: 64
End: 2024-08-05

## 2024-08-05 VITALS
WEIGHT: 208.5 LBS | SYSTOLIC BLOOD PRESSURE: 130 MMHG | HEART RATE: 71 BPM | RESPIRATION RATE: 12 BRPM | BODY MASS INDEX: 28.28 KG/M2 | OXYGEN SATURATION: 97 % | DIASTOLIC BLOOD PRESSURE: 72 MMHG

## 2024-08-05 DIAGNOSIS — C61 PROSTATE CANCER (H): ICD-10-CM

## 2024-08-05 NOTE — PROGRESS NOTES
Department of Radiation Oncology  Radiation Therapy Center  [facility]  [address], [address]  [place] [address]  [phone]       Consultation Note    Name: Forest Sandoval MRN: [id number]   : 1960   Date of Service: 2024  Referring: Dr. Dominique     Reason for consultation: Prostate adenocarcinoma, favorable intermediate risk, [place] score of 3+4 =l 7, PSA = 6.78, clinical [id number].  Evaluate potential role for radiation therapy.     History of Present Illness   Mr. Sandoval is a 64 year old male with a diagnosis of prostate cancer.    PSA on 2023 demonstrated a value of 6.78.  Repeat PSA on 2024 demonstrated value 5.46.  MRI of the prostate obtained on 3/15/2024 demonstrated a 43 g prostate.  There was noted PI-RADS 4 lesion, in the right peripheral zone.  No extracapsular extension was noted.  There is an asymmetric plaque thickening along the posterior urinary bladder wall, indeterminate.  Patient underwent cystoscopy which was negative.  Prostate biopsy obtained on 2024 demonstrated prostate adenocarcinoma, McDowell 3+4 equal 7, cancer involving 3 of 16 cores obtained.  Patient met with Dr. Dominique who discussed different options including surgery versus radiation therapy.  Patient presents today to discuss potential role of radiation therapy as a part of treatment strategy.    Patient is overall doing okay.  No acute complaints.  AUS = 20.  No prior radiation.  No pacemaker.  No history of inflammatory bowel disease.    Past Medical History:   Past Medical History:   Diagnosis Date    NO ACTIVE PROBLEMS        Past Surgical History:   Past Surgical History:   Procedure Laterality Date    COLONOSCOPY N/A 2015    Procedure: COLONOSCOPY;  Surgeon: Dez Pickard MD;  Location:  OR    COLONOSCOPY N/A 2/3/2023    Procedure: COLONOSCOPY;  Surgeon: Raghav Lee DO;  Location: [facility]    COLONOSCOPY WITH CO2 INSUFFLATION N/A 2015     Procedure: COLONOSCOPY WITH CO2 INSUFFLATION;  Surgeon: Dez Pickard MD;  Location: [facility]    NO HISTORY OF SURGERY         Chemotherapy History:  None    Radiation History:  None    Pregnant: Not Applicable  Implanted Cardiac Devices: No    Medications:  Current Outpatient Medications   Medication Sig Dispense Refill    rosuvastatin (CRESTOR) 10 MG tablet Take 1 tablet (10 mg) by mouth daily 90 tablet 3     No current facility-administered medications for this visit.         Allergies:     Allergies   Allergen Reactions    Contrast Dye Other (See Comments)     LW CM1: >>> NO CONTRAST ADVERSE REACTION <<<     Reaction :    Sulfa Antibiotics Unknown    Tobramycin Unknown           Family History:  Family History   Problem Relation Age of Onset    Myocardial Infarction Mother 66    Abdominal Aortic Aneurysm Mother     Heart Disease Mother     Alcohol/Drug Father     Heart Failure Father     Heart Disease Father     Cancer Sister         BCC and Melanoma    Myocardial Infarction Sister        Review of Systems   A 10-point review of systems was performed. Pertinent findings are noted in the HPI.    Physical Exam   ECOG Status: 1    Vitals:  /72 (BP Location: Left arm, Patient Position: Sitting, Cuff Size: Adult Regular)   Pulse 71   Resp 12   Wt 94.6 kg (208 lb 8 oz)   SpO2 97%   BMI 28.28 kg/m      Gen: Alert, in NAD  Head: NC/AT  Eyes: PERRL, EOMI, sclera anicteric  Ears: No external auricular lesions  Nose/sinus: No rhinorrhea or epistaxis  Oral cavity/oropharynx: MMM, no visible oral cavity lesions, FOM and BOT are soft to palpation  Neck: Full ROM, supple, no palpable adenopathy  Pulm: No wheezing, stridor or respiratory distress  CV: Extremities are warm and well-perfused, no cyanosis, no pedal edema  Abdominal: Normal bowel sounds, soft, nontender, no masses  Musculoskeletal: Normal bulk and tone  Skin: Normal color and turgor  Neuro: A/Ox3, CN II-XII intact, normal gait    Imaging/Path/Labs    Imaging:   3/15/24  MRI prostate  IMPRESSION:  1. Based on the most suspicious abnormality, this exam is  characterized as PIRADS 4 - Clinically significant cancer is likely to  be present.  The most suspicious abnormality is located at the right  peripheral zone and there is short segment capsular abutment with low  likelihood of minimal extraprostatic extension.  2. No bulky adenopathy.  3. Asymmetric plaque-like thickening along right posterolateral  urinary bladder wall, consider cystoscopy or CT urogram as clinically  desired for further evaluation.    Path:   7/2/24  A. target 1:  -Benign prostate tissue     B. target 2:  -Prostatic adenocarcinoma, grade group 2 (Gerry score 3+4), pattern 4: 30%  Involving 2 of 2 cores, 30% of the tissue     C. target 3:  -Benign prostate tissue     D. R base:  -Benign prostate tissue     E. R mid:  -Benign prostate tissue     F. R apex:  -Prostatic adenocarcinoma, grade group 2 ([place] score 3+4), pattern 4: 30%  Involving 1 of 2 cores, 20% of the tissue     G. L base:  -Benign prostate tissue     H. L mid:  -Benign prostate tissue     I. L apex:  -Benign prostate tissue    Labs:   PSA   Date Value Ref Range Status   06/27/2018 2.39 0 - 4 ug/L Final     Comment:     Assay Method:  Chemiluminescence using Siemens Vista analyzer   09/24/2014 1.25 0 - 4 ug/L Final     Prostate Specific Antigen Screen   Date Value Ref Range Status   11/24/2023 6.78 (H) 0.00 - 4.50 ng/mL Final   03/08/2022 5.33 (H) 0.00 - 4.00 ug/L Final     PSA Tumor Marker   Date Value Ref Range Status   02/09/2024 5.46 (H) 0.00 - 4.50 ng/mL Final         Assessment    Mr. Sandoval is a 64 year old male with a diagnosis of prostate adenocarcinoma, favorable intermediate risk, Gerry score of 3+4 =l 7, PSA = 6.78, clinical [id number].  Evaluate potential role for radiation therapy.     Plan   I discussed the natural history of what appears to be favorable intermediate risk prostate cancer.       I discussed  treatment options including proceeding with active surveillance versus surgery vs. radiation therapy.  However, given relatively younger age and multiple cores involved, I favor definitive management. If radiation therapy were pursued, I would tentatively plan to treat with hypofractionated radiation therapy.     Discussed side effects in great detail including but not limited to cystitis, proctitis, and impact on sexual health.     We discussed consideration of Space OAR gel to minimize rectal toxicity prior to RT.     5.   The patient wishes to think about his options.  He will also meet with urology colleagues to discuss surgical options.  He will get back to us on how he wishes to ultimately proceed.    Jayce Gibbs MD  Department of Radiation Oncology  [facility]

## 2024-08-05 NOTE — LETTER
2024      Forest Sandoval  [address] [address]      Dear Colleague,    Thank you for referring your patient, Forest Sandoval, to the [facility] RADIATION TH[facility]APY CLINIC. Please see a copy of my visit note below.       Department of Radiation Oncology  Radiation Therapy Center  [facility]  [address], [address]  [place], [place] [address] [phone]       Consultation Note    Name: Forest Sandoval MRN: [id number]   : 1960   Date of Service: 2024  Referring: Dr. Dominique     Reason for consultation: Prostate adenocarcinoma, favorable intermediate risk, Gerry score of 3+4 =l 7, PSA = 6.78, clinical [id number].  Evaluate potential role for radiation therapy.     History of Present Illness   Mr. Sandoval is a 64 year old male with a diagnosis of prostate cancer.    PSA on 2023 demonstrated a value of 6.78.  Repeat PSA on 2024 demonstrated value 5.46.  MRI of the prostate obtained on 3/15/2024 demonstrated a 43 g prostate.  There was noted PI-RADS 4 lesion, in the right peripheral zone.  No extracapsular extension was noted.  There is an asymmetric plaque thickening along the posterior urinary bladder wall, indeterminate.  Patient underwent cystoscopy which was negative.  Prostate biopsy obtained on 2024 demonstrated prostate adenocarcinoma, Gerry 3+4 equal 7, cancer involving 3 of 16 cores obtained.  Patient met with Dr. Dominique who discussed different options including surgery versus radiation therapy.  Patient presents today to discuss potential role of radiation therapy as a part of treatment strategy.    Patient is overall doing okay.  No acute complaints.  AUS = 20.  No prior radiation.  No pacemaker.  No history of inflammatory bowel disease.    Past Medical History:   Past Medical History:   Diagnosis Date     NO ACTIVE PROBLEMS        Past Surgical History:   Past Surgical History:   Procedure Laterality Date     COLONOSCOPY  N/A 12/28/2015    Procedure: COLONOSCOPY;  Surgeon: Dez Pickard MD;  Location: [facility]     COLONOSCOPY N/A 2/3/2023    Procedure: COLONOSCOPY;  Surgeon: Raghav Lee DO;  Location: [facility]     COLONOSCOPY WITH CO2 INSUFFLATION N/A 12/28/2015    Procedure: COLONOSCOPY WITH CO2 INSUFFLATION;  Surgeon: Dez Pickard MD;  Location: [facility]     NO HISTORY OF SURGERY         Chemotherapy History:  None    Radiation History:  None    Pregnant: Not Applicable  Implanted Cardiac Devices: No    Medications:  Current Outpatient Medications   Medication Sig Dispense Refill     rosuvastatin (CRESTOR) 10 MG tablet Take 1 tablet (10 mg) by mouth daily 90 tablet 3     No current facility-administered medications for this visit.         Allergies:     Allergies   Allergen Reactions     Contrast Dye Other (See Comments)     LW CM1: >>> NO CONTRAST ADVERSE REACTION <<<     Reaction :     Sulfa Antibiotics Unknown     Tobramycin Unknown           Family History:  Family History   Problem Relation Age of Onset     Myocardial Infarction Mother 66     Abdominal Aortic Aneurysm Mother      Heart Disease Mother      Alcohol/Drug Father      Heart Failure Father      Heart Disease Father      Cancer Sister         BCC and Melanoma     Myocardial Infarction Sister        Review of Systems   A 10-point review of systems was performed. Pertinent findings are noted in the HPI.    Physical Exam   ECOG Status: 1    Vitals:  /72 (BP Location: Left arm, Patient Position: Sitting, Cuff Size: Adult Regular)   Pulse 71   Resp 12   Wt 94.6 kg (208 lb 8 oz)   SpO2 97%   BMI 28.28 kg/m      Gen: Alert, in NAD  Head: NC/AT  Eyes: PERRL, EOMI, sclera anicteric  Ears: No external auricular lesions  Nose/sinus: No rhinorrhea or epistaxis  Oral cavity/oropharynx: MMM, no visible oral cavity lesions, FOM and BOT are soft to palpation  Neck: Full ROM, supple, no palpable adenopathy  Pulm: No wheezing, stridor or respiratory distress  CV:  Extremities are warm and well-perfused, no cyanosis, no pedal edema  Abdominal: Normal bowel sounds, soft, nontender, no masses  Musculoskeletal: Normal bulk and tone  Skin: Normal color and turgor  Neuro: A/Ox3, CN II-XII intact, normal gait    Imaging/Path/Labs   Imaging:   3/15/24  MRI prostate  IMPRESSION:  1. Based on the most suspicious abnormality, this exam is  characterized as PIRADS 4 - Clinically significant cancer is likely to  be present.  The most suspicious abnormality is located at the right  peripheral zone and there is short segment capsular abutment with low  likelihood of minimal extraprostatic extension.  2. No bulky adenopathy.  3. Asymmetric plaque-like thickening along right posterolateral  urinary bladder wall, consider cystoscopy or CT urogram as clinically  desired for further evaluation.    Path:   7/2/24  A. target 1:  -Benign prostate tissue     B. target 2:  -Prostatic adenocarcinoma, grade group 2 (Gerry score 3+4), pattern 4: 30%  Involving 2 of 2 cores, 30% of the tissue     C. target 3:  -Benign prostate tissue     D. R base:  -Benign prostate tissue     E. R mid:  -Benign prostate tissue     F. R apex:  -Prostatic adenocarcinoma, grade group 2 ([place] score 3+4), pattern 4: 30%  Involving 1 of 2 cores, 20% of the tissue     G. L base:  -Benign prostate tissue     H. L mid:  -Benign prostate tissue     I. L apex:  -Benign prostate tissue    Labs:   PSA   Date Value Ref Range Status   06/27/2018 2.39 0 - 4 ug/L Final     Comment:     Assay Method:  Chemiluminescence using Siemens Vista analyzer   09/24/2014 1.25 0 - 4 ug/L Final     Prostate Specific Antigen Screen   Date Value Ref Range Status   11/24/2023 6.78 (H) 0.00 - 4.50 ng/mL Final   03/08/2022 5.33 (H) 0.00 - 4.00 ug/L Final     PSA Tumor Marker   Date Value Ref Range Status   02/09/2024 5.46 (H) 0.00 - 4.50 ng/mL Final         Assessment    Mr. Sandoval is a 64 year old male with a diagnosis of prostate adenocarcinoma,  favorable intermediate risk, [place] score of 3+4 =l 7, PSA = 6.78, clinical [id number].  Evaluate potential role for radiation therapy.     Plan   I discussed the natural history of what appears to be favorable intermediate risk prostate cancer.       I discussed treatment options including proceeding with active surveillance versus surgery vs. radiation therapy.  However, given relatively younger age and multiple cores involved, I favor definitive management. If radiation therapy were pursued, I would tentatively plan to treat with hypofractionated radiation therapy.     Discussed side effects in great detail including but not limited to cystitis, proctitis, and impact on sexual health.     We discussed consideration of Space OAR gel to minimize rectal toxicity prior to RT.     5.   The patient wishes to think about his options.  He will also meet with urology colleagues to discuss surgical options.  He will get back to us on how he wishes to ultimately proceed.    Jayce Gibbs MD  Department of Radiation Oncology  [facility]         Again, thank you for allowing me to participate in the care of your patient.        Sincerely,        Jayce Gibbs MD

## 2024-08-05 NOTE — NURSING NOTE
"REASON FOR APPOINTMENT   Prostate cancer, sent by Dr. Dominique to discuss RT  Still a surgical candidate and trial candidate  See epic for details    PERSONAL HISTORY OF CANCER   Previous Cancer ? no   Prior Radiation ? no   Prior Chemotherapy ? no   Prior Hormonal Therapy ? no     REFERRALS NEEDED  Not at this time    VITALS  /72 (BP Location: Left arm, Patient Position: Sitting, Cuff Size: Adult Regular)   Pulse 71   Resp 12   Wt 94.6 kg (208 lb 8 oz)   SpO2 97%   BMI 28.28 kg/m      PACEMAKER/IMPLANTED CARDIAC DEVICE : no    PAIN  Denies    PSYCHOSOCIAL  Marital Status:   Patient lives in [place]   Number of children: 2 grown  Working status: full time investments  Do you feel safe in your home? Yes    REVIEW OF SYSTEMS  Skin: negative  Eyes: negative  Ears/Nose/Throat: negative  Respiratory: occasionally needs to take a deep breath, otherwise no issues \"it may be due to my smoking history\"  Cardiovascular: negative  Gastrointestinal: negative  Genitourinary: nocturia, frequency, and urgency AUA - 20  Musculoskeletal: negative  Neurologic: negative  Psychiatric: negative  Hematologic/Lymphatic/Immunologic: negative  Endocrine: negative    Radiation Oncology Patient Teaching    Current Concern: patient still gathering information of treatment options    Person involved with teaching: Patient  Patient asked Questions: Yes  Patient was cooperative: Yes  Patient was receptive (willing to accept information given): Yes    Education Assessment  Comprehension ability: High  Knowledge level: Medium  Factors affecting teaching: None    Response To Teaching  Verbalizes understanding    Do you have an advanced directive or living will? No  Are you DNR/DNI? No      "

## 2024-08-22 ENCOUNTER — MYC MEDICAL ADVICE (OUTPATIENT)
Dept: FAMILY MEDICINE | Facility: CLINIC | Age: 64
End: 2024-08-22
Payer: COMMERCIAL

## 2024-09-06 ENCOUNTER — PREP FOR PROCEDURE (OUTPATIENT)
Dept: UROLOGY | Facility: CLINIC | Age: 64
End: 2024-09-06
Payer: COMMERCIAL

## 2024-09-06 DIAGNOSIS — C61 PROSTATE CANCER (H): Primary | ICD-10-CM

## 2024-09-06 RX ORDER — HEPARIN SODIUM 10000 [USP'U]/ML
5000 INJECTION, SOLUTION INTRAVENOUS; SUBCUTANEOUS
OUTPATIENT
Start: 2024-09-06

## 2024-09-06 RX ORDER — ACETAMINOPHEN 325 MG/1
975 TABLET ORAL ONCE
OUTPATIENT
Start: 2024-09-06 | End: 2024-09-06

## 2024-09-10 ENCOUNTER — OFFICE VISIT (OUTPATIENT)
Dept: FAMILY MEDICINE | Facility: CLINIC | Age: 64
End: 2024-09-10
Payer: COMMERCIAL

## 2024-09-10 VITALS
TEMPERATURE: 96.9 F | SYSTOLIC BLOOD PRESSURE: 133 MMHG | HEIGHT: 72 IN | HEART RATE: 65 BPM | RESPIRATION RATE: 12 BRPM | OXYGEN SATURATION: 96 % | BODY MASS INDEX: 27.74 KG/M2 | DIASTOLIC BLOOD PRESSURE: 88 MMHG | WEIGHT: 204.8 LBS

## 2024-09-10 DIAGNOSIS — Z85.46 HISTORY OF PROSTATE CANCER: ICD-10-CM

## 2024-09-10 DIAGNOSIS — Z01.818 PREOP GENERAL PHYSICAL EXAM: Primary | ICD-10-CM

## 2024-09-10 DIAGNOSIS — Z12.2 SCREENING FOR LUNG CANCER: ICD-10-CM

## 2024-09-10 DIAGNOSIS — K42.9 UMBILICAL HERNIA WITHOUT OBSTRUCTION AND WITHOUT GANGRENE: ICD-10-CM

## 2024-09-10 DIAGNOSIS — E78.5 HYPERLIPIDEMIA LDL GOAL <130: ICD-10-CM

## 2024-09-10 DIAGNOSIS — Z87.891 FORMER SMOKER: ICD-10-CM

## 2024-09-10 PROCEDURE — 99214 OFFICE O/P EST MOD 30 MIN: CPT | Performed by: NURSE PRACTITIONER

## 2024-09-10 NOTE — PROGRESS NOTES
Preoperative Evaluation  [facility] [place]  [address]  [place] [address]  Phone: [phone]  Primary Provider: Karen Madison MD  Pre-op Performing Provider: TEA MEDINA  Sep 10, 2024             9/4/2024   Surgical Information   What procedure is being done? Umbilical Hernia repair   Facility or Hospital where procedure/surgery will be performed: [facility], [address] [place] [address]   Who is doing the procedure / surgery? Dr. López. (Question spelling)   Date of surgery / procedure: 9/20/24   Time of surgery / procedure: TBD, morning   Where do you plan to recover after surgery? at home with family        Fax number for surgical facility: Note does not need to be faxed, will be available electronically in Epic.    Assessment & Plan     The proposed surgical procedure is considered INTERMEDIATE risk.    Preop general physical exam      Umbilical hernia without obstruction and without gangrene      Former smoker    - CT Chest Lung Cancer Scrn Low Dose wo; Future    Screening for lung cancer    - CT Chest Lung Cancer Scrn Low Dose wo; Future    History of prostate cancer    - CT Chest Lung Cancer Scrn Low Dose wo; Future    Hyperlipidemia LDL goal <130      Risks and Recommendations  The patient has the following additional risks and recommendations for perioperative complications:   - No identified additional risk factors other than previously addressed    Preoperative Medication Instructions  Antiplatelet or Anticoagulation Medication Instructions   - Patient is on no antiplatelet or anticoagulation medications.    Additional Medication Instructions  Take all scheduled medications on the day of surgery EXCEPT for modifications listed below:   - Herbal medications and vitamins: DO NOT TAKE 14 days prior to surgery.   - ibuprofen (Advil, Motrin): DO NOT TAKE 1 day before surgery.    - naproxen (Aleve, Naprosyn): DO NOT TAKE 4 days before surgery.      Recommendation  Approval given to proceed with proposed procedure, without further diagnostic evaluation.    Sergio Dinh is a 64 year old, presenting for the following:  Pre-Op Exam    Wanting lung cancer screening ordered as order . Former smoker        9/10/2024     8:14 AM   Additional Questions   Roomed by Orquidea GUERRA CMA         9/10/2024     8:14 AM   Patient Reported Additional Medications   Patient reports taking the following new medications None     HPI related to upcoming procedure: Umbilical hernia repair, developed cold symptoms prior to last scheduled procedure so needed to reschedule.        2024   Pre-Op Questionnaire   Have you ever had a heart attack or stroke? No   Have you ever had surgery on your heart or blood vessels, such as a stent placement, a coronary artery bypass, or surgery on an artery in your head, neck, heart, or legs? No   Do you have chest pain with activity? No   Do you have a history of heart failure? No   Do you currently have a cold, bronchitis or symptoms of other infection? No   Do you have a cough, shortness of breath, or wheezing? No   Do you or anyone in your family have previous history of blood clots? No   Do you or does anyone in your family have a serious bleeding problem such as prolonged bleeding following surgeries or cuts? No   Have you ever had problems with anemia or been told to take iron pills? No   Have you had any abnormal blood loss such as black, tarry or bloody stools? No   Have you ever had a blood transfusion? No   Are you willing to have a blood transfusion if it is medically needed before, during, or after your surgery? Yes   Have you or any of your relatives ever had problems with anesthesia? No   Do you have sleep apnea, excessive snoring or daytime drowsiness? Denies, declines sleep study   Do you have any artifical heart valves or other implanted medical devices like a pacemaker, defibrillator, or continuous glucose monitor? No    Do you have artificial joints? No   Are you allergic to latex? No        Health Care Directive  Patient does not have a Health Care Directive or Living Will: Discussed advance care planning with patient; information given to patient to review.    Preoperative Review of    reviewed - no record of controlled substances prescribed.      Status of Chronic Conditions:  HYPERLIPIDEMIA - Patient has a long history of significant Hyperlipidemia requiring medication for treatment with recent good control. Patient reports no problems or side effects with the medication.     Patient Active Problem List    Diagnosis Date Noted    Prostate cancer (H) 07/13/2024     Priority: Medium    Snoring 05/14/2024     Priority: Medium    Dental infection 05/14/2024     Priority: Medium    Family history of ischemic heart disease 11/24/2023     Priority: Medium    Former smoker 11/24/2023     Priority: Medium    Umbilical hernia without obstruction and without gangrene 11/24/2023     Priority: Medium    Generalized anxiety disorder 02/25/2015     Priority: Medium    BPH (benign prostatic hyperplasia) 09/25/2014     Priority: Medium    DJD (degenerative joint disease) of knee 09/25/2014     Priority: Medium    Dermatitis 07/17/2012     Priority: Medium    Hyperlipidemia LDL goal <130 09/16/2010     Priority: Medium      Past Medical History:   Diagnosis Date    Cancer (H)     prostate cancer     Past Surgical History:   Procedure Laterality Date    COLONOSCOPY N/A 12/28/2015    Procedure: COLONOSCOPY;  Surgeon: Dez Pickard MD;  Location: [facility]    COLONOSCOPY N/A 2/3/2023    Procedure: COLONOSCOPY;  Surgeon: Raghav Lee DO;  Location: [facility]    COLONOSCOPY WITH CO2 INSUFFLATION N/A 12/28/2015    Procedure: COLONOSCOPY WITH CO2 INSUFFLATION;  Surgeon: Dez Pickard MD;  Location: [facility]    NO HISTORY OF SURGERY       Current Outpatient Medications   Medication Sig Dispense Refill    rosuvastatin (CRESTOR) 10 MG  tablet Take 1 tablet (10 mg) by mouth daily 90 tablet 3       Allergies   Allergen Reactions    Contrast Dye Other (See Comments)     LW CM1: >>> NO CONTRAST ADVERSE REACTION <<<     Reaction :    Sulfa Antibiotics Unknown    Tobramycin Unknown        Social History     Tobacco Use    Smoking status: Former     Current packs/day: 0.00     Average packs/day: 1 pack/day for 24.0 years (24.0 ttl pk-yrs)     Types: Cigarettes     Start date: 1976     Quit date: 2000     Years since quittin.3     Passive exposure: Past    Smokeless tobacco: Never    Tobacco comments:     1 ppd x 24 yr, quit smoking in    Substance Use Topics    Alcohol use: Not Currently     Family History   Problem Relation Age of Onset    Myocardial Infarction Mother 66    Abdominal Aortic Aneurysm Mother     Heart Disease Mother     Alcohol/Drug Father     Heart Failure Father     Heart Disease Father     Cancer Sister         BCC and Melanoma    Myocardial Infarction Sister      History   Drug Use No             Review of Systems  CONSTITUTIONAL: NEGATIVE for fever, chills, change in weight  INTEGUMENTARY/SKIN: NEGATIVE for worrisome rashes, moles or lesions  EYES: NEGATIVE for irritation  EYES: POSITIVE for vision change, wears cheaters, needs to schedule eye exam per pt  ENT/MOUTH: NEGATIVE for ear, mouth and throat problems  RESP: NEGATIVE for significant cough or SOB  BREAST: NEGATIVE for masses, tenderness or discharge  CV: NEGATIVE for chest pain, palpitations or peripheral edema  GI: NEGATIVE for nausea, abdominal pain, heartburn, or change in bowel habits  : NEGATIVE for dysuria, or hematuria   male :POSITIVE for urinary frequency, has prostate cancer recent diagnoses  MUSCULOSKELETAL: NEGATIVE for significant arthralgias or myalgia  NEURO: NEGATIVE for weakness, dizziness or paresthesias  ENDOCRINE: NEGATIVE for temperature intolerance, skin/hair changes  HEME: NEGATIVE for bleeding problems  PSYCHIATRIC: NEGATIVE for  "changes in mood or affect    Objective    /88   Pulse 65   Temp 96.9  F (36.1  C) (Temporal)   Resp 12   Ht 1.83 m (6' 0.05\")   Wt 92.9 kg (204 lb 12.8 oz)   SpO2 96%   BMI 27.74 kg/m     Estimated body mass index is 27.74 kg/m  as calculated from the following:    Height as of this encounter: 1.83 m (6' 0.05\").    Weight as of this encounter: 92.9 kg (204 lb 12.8 oz).  Physical Exam  GENERAL: alert and no distress  EYES: Eyes grossly normal to inspection, PERRL and conjunctivae and sclerae normal  HENT: ear canals and TM's normal, nose and mouth without ulcers or lesions  NECK: no adenopathy, no asymmetry, masses, or scars  RESP: lungs clear to auscultation - no rales, rhonchi or wheezes  CV: regular rate and rhythm, normal S1 S2, no S3 or S4, no murmur, click or rub, no peripheral edema  ABDOMEN: soft, nontender, no hepatosplenomegaly, no masses and bowel sounds normal  MS: no gross musculoskeletal defects noted, no edema  SKIN: no suspicious lesions or rashes  NEURO: Normal strength and tone, mentation intact and speech normal  PSYCH: mentation appears normal, affect normal/bright    No results for input(s): \"HGB\", \"PLT\", \"INR\", \"NA\", \"POTASSIUM\", \"CR\", \"A1C\" in the last 8760 hours.     Diagnostics  No labs were ordered during this visit.   No EKG this visit, completed in the last 90 days.    Revised Cardiac Risk Index (RCRI)  The patient has the following serious cardiovascular risks for perioperative complications:   - No serious cardiac risks = 0 points     RCRI Interpretation: 0 points: Class I (very low risk - 0.4% complication rate)         Signed Electronically by: TEA MEDINA  A copy of this evaluation report is provided to the requesting physician.         "

## 2024-09-10 NOTE — TELEPHONE ENCOUNTER
Patient is rescheduled for surgery with Dr. Garza     Spoke with: Bill     Date of Surgery: 10/10/2024     [facility]     Informed patient they will need an adult : Yes     Pre op with Provider n/a     H&P: Scheduled with [facility]     Additional imaging/appointments: n/a     Surgery packet: To be sent via [facility]      Additional comments: n/a       Lizette Noguera on 9/10/2024 at 3:22 PM

## 2024-09-10 NOTE — PATIENT INSTRUCTIONS

## 2024-09-11 ENCOUNTER — PRE VISIT (OUTPATIENT)
Dept: UROLOGY | Facility: CLINIC | Age: 64
End: 2024-09-11
Payer: COMMERCIAL

## 2024-09-11 NOTE — TELEPHONE ENCOUNTER
Reason for visit: follow up after oncology apt      Dx/Hx/Sx: prostate cancer     Records/imaging/labs/orders: in epic     At Rooming: standard

## 2024-09-12 ENCOUNTER — TELEPHONE (OUTPATIENT)
Dept: ONCOLOGY | Facility: CLINIC | Age: 64
End: 2024-09-12
Payer: COMMERCIAL

## 2024-09-12 NOTE — TELEPHONE ENCOUNTER
0912  [facility] for pt to look at surg dates   [facility]  [phone]

## 2024-09-18 ENCOUNTER — TELEPHONE (OUTPATIENT)
Dept: ONCOLOGY | Facility: CLINIC | Age: 64
End: 2024-09-18
Payer: COMMERCIAL

## 2024-09-18 NOTE — TELEPHONE ENCOUNTER
0918  [facility] for pt to look at surg dates   [facility]  [phone]

## 2024-09-19 ENCOUNTER — TELEPHONE (OUTPATIENT)
Dept: ONCOLOGY | Facility: CLINIC | Age: 64
End: 2024-09-19
Payer: COMMERCIAL

## 2024-09-19 NOTE — TELEPHONE ENCOUNTER
Patient is scheduled for surgery with Dr. jonas    Spoke with: pt    Date of Surgery: 1213    Location: [facility]    Informed patient they will need an adult  y    Pre op with Provider y    H&P: Scheduled with pt to sched with PMD    Additional imaging/appointments: n    Surgery packet: I mailed     Additional comments: post ops made in [facility] on 9/19/2024 at 12:38 PM

## 2024-09-24 ENCOUNTER — OFFICE VISIT (OUTPATIENT)
Dept: UROLOGY | Facility: CLINIC | Age: 64
End: 2024-09-24
Payer: COMMERCIAL

## 2024-09-24 VITALS
SYSTOLIC BLOOD PRESSURE: 120 MMHG | WEIGHT: 204 LBS | DIASTOLIC BLOOD PRESSURE: 79 MMHG | OXYGEN SATURATION: 92 % | BODY MASS INDEX: 27.63 KG/M2 | HEART RATE: 56 BPM | HEIGHT: 72 IN

## 2024-09-24 DIAGNOSIS — C61 PROSTATE CANCER (H): Primary | ICD-10-CM

## 2024-09-24 PROCEDURE — 99214 OFFICE O/P EST MOD 30 MIN: CPT | Performed by: UROLOGY

## 2024-09-24 ASSESSMENT — PAIN SCALES - GENERAL: PAINLEVEL: NO PAIN (0)

## 2024-09-24 NOTE — PROGRESS NOTES
"  CHIEF COMPLAINT   It was my pleasure to see Forest Sandoval who is a 64 year old male for follow-up of Prostate Cancer.      HPI  Forest Sandoval is a very pleasant 64 year old male who presents with a history of Prostate Cancer. PSA 5.46. Found to have PIRADS 4 on MRI, and now with biopsy demonstrating Gerry 3+4=7 prostate cancer. No complications from biopsy. At baseline, he notes he does not have good erectile function and has some Peyronie's disease.     Of note, he had some bladder thickening noted on MRI, but cystoscopy with Dr. Argueta demonstrated no suspicious lesions.      PSA 5.46.    He returns today to discuss treatment. He has met with radiation oncology and has decided to proceed with prostatectomy.      TRUS Biopsy 7/2/2024  Final Diagnosis      B. target 2:  -Prostatic adenocarcinoma, grade group 2 (Gerry score 3+4), pattern 4: 30%  Involving 2 of 2 cores, 30% of the tissue     F. R apex:  -Prostatic adenocarcinoma, grade group 2 ([place] score 3+4), pattern 4: 30%  Involving 1 of 2 cores, 20% of the tissue         MRI Prostate 3/15/2024  Size: 4.7 x 3.6 x 4.9 cm; 43 grams   IMPRESSION:  1. Based on the most suspicious abnormality, this exam is  characterized as PIRADS 4 - Clinically significant cancer is likely to  be present.  The most suspicious abnormality is located at the right  peripheral zone and there is short segment capsular abutment with low  likelihood of minimal extraprostatic extension.  2. No bulky adenopathy.  3. Asymmetric plaque-like thickening along right posterolateral  urinary bladder wall, consider cystoscopy or CT urogram as clinically  desired for further evaluation.    PHYSICAL EXAM  Patient is a 64 year old  male   Vitals: Blood pressure 120/79, pulse 56, height 1.822 m (5' 11.75\"), weight 92.5 kg (204 lb), SpO2 92%.  General Appearance Adult: Body mass index is 27.86 kg/m .  Alert, no acute distress, oriented  Lungs: no respiratory distress, or pursed lip " breathing  Abdomen: soft, nontender, no organomegaly or masses  Back: no CVAT  Neuro: Alert, oriented, speech and mentation normal  Psych: affect and mood normal    Outside and Past Medical records:  Review of the result(s) of each unique test - PSA, MRI, pathology    ASSESSMENT and PLAN  64 year old male with Gerry 3+4=7 prostate cancer, PSA 5.46, PIRADS 4 on MRI. We reviewed our previous discussion about the significance of localized, prostate cancer. We discussed the options for treatment of a localized prostate cancer including active surveillance, brachytherapy, external beam radiation therapy, and surgical removal. We discussed that based on his Shipman score, age, and overall excellent health, he would not be an ideal candidate for active surveillance.       We reviewed the relative merits of robotic assisted radical prostatectomy. We also discussed the advantages and disadvantages and roles of open surgery vs. laparoscopic (and Da Erika assisted) surgery. The anticipated post-operative course was explained, including an anticipated 1-2 day hospital stay. Catheter will remain in place 7-10 days. The risks, benefits, alternatives, and personnel involved with robotic prostatectomy were discussed in detail. Specifically, we discussed that risks include but are not limited to anesthetic complications including stroke, MI, DVT/PE, as well as risk of bleeding requiring transfusion, bowel injury, infection, lymphocele, ureteral injury, nerve injury,urine leak and other potentially unforseen complications. Also discussed the risk of bladder neck contracture and other urinary symptoms after procedure. In addition, we discussed risk long-term of urinary incontinence and erectile dysfunction following the procedure. Finally, we discussed risk for adverse pathologic features, including positive surgical margins and potential for post-surgical radiation, hormone ablation and long-term need for PSA monitoring.       He  has met with radiation oncology and has elected to proceed with surgery. He asked many excellent questions today that were answered in detail.     Plan:  Schedule robotic-assisted laparoscopic radical prostatectomy with pelvic lymphadenectomy    25 minutes spent on the date of the encounter doing chart review, history and exam, documentation and further activities as noted above.    Manny Dominique MD  Urology  [facility]

## 2024-09-24 NOTE — Clinical Note
9/24/2024       RE: Forest Sandoval  [address] [facility] [address]     Dear Colleague,    Thank you for referring your patient, Forest Sandoval, to the [facility] [place] at [facility]. Please see a copy of my visit note below.      CHIEF COMPLAINT   It was my pleasure to see Forest Sandoval who is a 64 year old male for follow-up of Prostate Cancer.      HPI  Forest Sandoval is a very pleasant 64 year old male who presents with a history of Prostate Cancer. PSA 5.46. Found to have PIRADS 4 on MRI, and now with biopsy demonstrating Gerry 3+4=7 prostate cancer. No complications from biopsy. At baseline, he notes he does not have good erectile function and has some Peyronie's disease.     Of note, he had some bladder thickening noted on MRI, but cystoscopy with Dr. Argueta demonstrated no suspicious lesions.      PSA 5.46.     TRUS Biopsy 7/2/2024  Final Diagnosis   A. target 1:  -Benign prostate tissue     B. target 2:  -Prostatic adenocarcinoma, grade group 2 ([place] score 3+4), pattern 4: 30%  Involving 2 of 2 cores, 30% of the tissue     C. target 3:  -Benign prostate tissue     D. R base:  -Benign prostate tissue     E. R mid:  -Benign prostate tissue     F. R apex:  -Prostatic adenocarcinoma, grade group 2 (Gerry score 3+4), pattern 4: 30%  Involving 1 of 2 cores, 20% of the tissue     G. L base:  -Benign prostate tissue     H. L mid:  -Benign prostate tissue     I. L apex:  -Benign prostate tissue      MRI Prostate 3/15/2024  Size: 4.7 x 3.6 x 4.9 cm; 43 grams   IMPRESSION:  1. Based on the most suspicious abnormality, this exam is  characterized as PIRADS 4 - Clinically significant cancer is likely to  be present.  The most suspicious abnormality is located at the right  peripheral zone and there is short segment capsular abutment with low  likelihood of minimal extraprostatic extension.  2. No bulky  "adenopathy.  3. Asymmetric plaque-like thickening along right posterolateral  urinary bladder wall, consider cystoscopy or CT urogram as clinically  desired for further evaluation.    PHYSICAL EXAM  Patient is a 64 year old  male   Vitals: Blood pressure 120/79, pulse 56, height 1.822 m (5' 11.75\"), weight 92.5 kg (204 lb), SpO2 92%.  General Appearance Adult: Body mass index is 27.86 kg/m .  Alert, no acute distress, oriented  Lungs: no respiratory distress, or pursed lip breathing  Abdomen: soft, nontender, no organomegaly or masses; ***  Back: *** CVAT  Neuro: Alert, oriented, speech and mentation normal  Psych: affect and mood normal  : {MG EXAM MALE GENITAL:258888}    Outside and Past Medical records:  Review of the result(s) of each unique test - PSA, MRI, pathology    ASSESSMENT and PLAN  64 year old male with new diagnosis of Gerry 3+4=7 prostate cancer, PSA 5.46, PIRADS 4 on MRI. We had an extensive discussion about the significance of localized, prostate cancer. We discussed the options for treatment of a localized prostate cancer including active surveillance, brachytherapy, external beam radiation therapy, and surgical removal. We discussed that based on his Ashley score, age, and overall excellent health, he would not be an ideal candidate for active surveillance.       We discussed the relative merits of robotic assisted radical prostatectomy. We also discussed the advantages and disadvantages and roles of open surgery vs. laparoscopic (and Da Erika assisted) surgery. The anticipated post-operative course was explained, including an anticipated 1-2 day hospital stay. Catheter will remain in place 7-10 days.      The risks, benefits, alternatives, and personnel involved with robotic prostatectomy were discussed in detail. Specifically, we discussed that risks include but are not limited to anesthetic complications including stroke, MI, DVT/PE, as well as risk of bleeding requiring transfusion, " bowel injury, infection, lymphocele, ureteral injury, nerve injury,urine leak and other potentially unforseen complications. Also discussed the risk of bladder neck contracture and other urinary symptoms after procedure. In addition, we discussed risk long-term of urinary incontinence and erectile dysfunction following the procedure. Finally, we discussed risk for adverse pathologic features, including positive surgical margins and potential for post-surgical radiation, hormone ablation and long-term need for PSA monitoring.       We discussed that there was no clear evidence of advantage between surgery and radiation with regard to risk of recurrence. Regarding radiation, we discussed risks including but not limited to cancer recurrence, exacerbation of voiding symptoms or hematuria, urinary retention, incontinence, stricture of the urinary tract, induction of second malignancy, and risks of rectal symptoms or bleeding.  We discussed fact that rectal symptoms may be more common with radiation than with other treatment modalities. With radiation therapy, we also discussed the difficulties in diagnosing recurrent disease at an early stage due to variability in PSA levels and the fact that most patients are not candidates for local salvage therapy when biochemical recurrence is declared.  We also discussed the significant morbidity of post-radiation local salvage therapy in terms of perioperative complications, erectile dysfunction and urinary incontinence. With surgery, we also discussed the potential advantage over radiation therapy in that biochemical recurrence can be detected at a relatively earlier stage and that salvage radiotherapy is successful in controlling recurrent disease in a substantial proportion of patients.  We also discussed that salvage radiotherapy was associated with a considerably more favorable morbidity profile compared to local salvage therapies for recurrent disease post-radiation.      We  discussed option for further discussion with radiation oncology and he is interested in learning more. Radiation oncology referral placed.      We also reviewed the notion of focal therapy, and discussed some of the pros and cons of this today. We reviewed that our option at [facility] is the VAPOR 2 trial. We discussed the concept of this trial, and that given his pathology, PSA, and imaging findings, he appears to be a candidate. He is interested in learning more, and will have the research team contact him.      Plan:  Radiation oncology consultation  Will contact about information related to VAPOR 2 trial  Follow-up to discuss      *** minutes spent on the date of the encounter doing chart review, history and exam, documentation and further activities as noted above.    Manny Dominique MD  Urology  [facility]        CHIEF COMPLAINT   It was my pleasure to see Forest Sandoval who is a 64 year old male for follow-up of Prostate Cancer.      HPI  Forest Sandoval is a very pleasant 64 year old male who presents with a history of Prostate Cancer. PSA 5.46. Found to have PIRADS 4 on MRI, and now with biopsy demonstrating Glidden 3+4=7 prostate cancer. No complications from biopsy. At baseline, he notes he does not have good erectile function and has some Peyronie's disease.     Of note, he had some bladder thickening noted on MRI, but cystoscopy with Dr. Argueta demonstrated no suspicious lesions.      PSA 5.46.    He returns today to discuss treatment. He has met with radiation oncology and has decided to proceed with prostatectomy.      TRUS Biopsy 7/2/2024  Final Diagnosis      B. target 2:  -Prostatic adenocarcinoma, grade group 2 (Glidden score 3+4), pattern 4: 30%  Involving 2 of 2 cores, 30% of the tissue     F. R apex:  -Prostatic adenocarcinoma, grade group 2 (Gerry score 3+4), pattern 4: 30%  Involving 1 of 2 cores, 20% of the tissue         MRI Prostate 3/15/2024  Size: 4.7 x 3.6 x  "4.9 cm; 43 grams   IMPRESSION:  1. Based on the most suspicious abnormality, this exam is  characterized as PIRADS 4 - Clinically significant cancer is likely to  be present.  The most suspicious abnormality is located at the right  peripheral zone and there is short segment capsular abutment with low  likelihood of minimal extraprostatic extension.  2. No bulky adenopathy.  3. Asymmetric plaque-like thickening along right posterolateral  urinary bladder wall, consider cystoscopy or CT urogram as clinically  desired for further evaluation.    PHYSICAL EXAM  Patient is a 64 year old  male   Vitals: Blood pressure 120/79, pulse 56, height 1.822 m (5' 11.75\"), weight 92.5 kg (204 lb), SpO2 92%.  General Appearance Adult: Body mass index is 27.86 kg/m .  Alert, no acute distress, oriented  Lungs: no respiratory distress, or pursed lip breathing  Abdomen: soft, nontender, no organomegaly or masses  Back: no CVAT  Neuro: Alert, oriented, speech and mentation normal  Psych: affect and mood normal    Outside and Past Medical records:  Review of the result(s) of each unique test - PSA, MRI, pathology    ASSESSMENT and PLAN  64 year old male with Gerry 3+4=7 prostate cancer, PSA 5.46, PIRADS 4 on MRI. We reviewed our previous discussion about the significance of localized, prostate cancer. We discussed the options for treatment of a localized prostate cancer including active surveillance, brachytherapy, external beam radiation therapy, and surgical removal. We discussed that based on his [place] score, age, and overall excellent health, he would not be an ideal candidate for active surveillance.       We reviewed the relative merits of robotic assisted radical prostatectomy. We also discussed the advantages and disadvantages and roles of open surgery vs. laparoscopic (and Da Erika assisted) surgery. The anticipated post-operative course was explained, including an anticipated 1-2 day hospital stay. Catheter will remain in " place 7-10 days. The risks, benefits, alternatives, and personnel involved with robotic prostatectomy were discussed in detail. Specifically, we discussed that risks include but are not limited to anesthetic complications including stroke, MI, DVT/PE, as well as risk of bleeding requiring transfusion, bowel injury, infection, lymphocele, ureteral injury, nerve injury,urine leak and other potentially unforseen complications. Also discussed the risk of bladder neck contracture and other urinary symptoms after procedure. In addition, we discussed risk long-term of urinary incontinence and erectile dysfunction following the procedure. Finally, we discussed risk for adverse pathologic features, including positive surgical margins and potential for post-surgical radiation, hormone ablation and long-term need for PSA monitoring.       He has met with radiation oncology and has elected to proceed with surgery. He asked many excellent questions today that were answered in detail.     Plan:  Schedule robotic-assisted laparoscopic radical prostatectomy with pelvic lymphadenectomy    25 minutes spent on the date of the encounter doing chart review, history and exam, documentation and further activities as noted above.    Manny Dominique MD  Urology  [facility]        Again, thank you for allowing me to participate in the care of your patient.      Sincerely,    Manny Dominique MD

## 2024-09-24 NOTE — NURSING NOTE
"Chief Complaint   Patient presents with    Follow Up       Blood pressure 120/79, pulse 56, height 1.822 m (5' 11.75\"), weight 92.5 kg (204 lb), SpO2 92%. Body mass index is 27.86 kg/m .    Patient Active Problem List   Diagnosis    Hyperlipidemia LDL goal <130    Dermatitis    BPH (benign prostatic hyperplasia)    DJD (degenerative joint disease) of knee    Generalized anxiety disorder    Family history of ischemic heart disease    Former smoker    Umbilical hernia without obstruction and without gangrene    Snoring    Dental infection    Prostate cancer (H)       Allergies   Allergen Reactions    Contrast Dye Other (See Comments)     LW CM1: >>> NO CONTRAST ADVERSE REACTION <<<     Reaction :    Sulfa Antibiotics Unknown    Tobramycin Unknown       Current Outpatient Medications   Medication Sig Dispense Refill    rosuvastatin (CRESTOR) 10 MG tablet Take 1 tablet (10 mg) by mouth daily 90 tablet 3       Social History     Tobacco Use    Smoking status: Former     Current packs/day: 0.00     Average packs/day: 1 pack/day for 24.0 years (24.0 ttl pk-yrs)     Types: Cigarettes     Start date: 1976     Quit date: 2000     Years since quittin.4     Passive exposure: Past    Smokeless tobacco: Never    Tobacco comments:     1 ppd x 24 yr, quit smoking in    Vaping Use    Vaping status: Never Used   Substance Use Topics    Alcohol use: Not Currently    Drug use: No       Marly Scott  2024  8:05 AM     "

## 2024-10-01 ENCOUNTER — ANCILLARY PROCEDURE (OUTPATIENT)
Dept: CT IMAGING | Facility: CLINIC | Age: 64
End: 2024-10-01
Attending: NURSE PRACTITIONER
Payer: COMMERCIAL

## 2024-10-01 DIAGNOSIS — Z12.2 SCREENING FOR LUNG CANCER: ICD-10-CM

## 2024-10-01 DIAGNOSIS — Z87.891 FORMER SMOKER: ICD-10-CM

## 2024-10-01 DIAGNOSIS — Z85.46 HISTORY OF PROSTATE CANCER: ICD-10-CM

## 2024-10-01 PROCEDURE — 71271 CT THORAX LUNG CANCER SCR C-: CPT | Mod: TC | Performed by: RADIOLOGY

## 2024-10-01 NOTE — RESULT ENCOUNTER NOTE
Chandana Garg,    Thank you for your recent office visit.    Here are your recent results.  Per radiology:      FINDINGS:     Lungs: Emphysema. 4 mm nodule pleural-based right middle lobe image  225. Tiny nodule posterior to this. A few additional smaller nodules  noted.    Additional findings:  No pleural or pericardial effusion. Normal  caliber thoracic aorta.  There are mild coronary vascular  calcifications consistent with coronary artery disease.Normal heart  size. Adrenal glands unremarkable. Remaining visualized upper abdomen  unremarkable. No frankly destructive bony lesions.      IMPRESSION:   1. ACR Assessment Category:  Lung-RADS Category 2. Benign appearance  or behavior. Recommendation:  continue annual screening.    2. Significant Incidental Finding(s):  Category S: No.      Feel free to contact me via [employer] or call the clinic at [phone].    Sincerely,    DYLAN Alonso, FNP-BC

## 2024-10-09 ENCOUNTER — ANESTHESIA EVENT (OUTPATIENT)
Dept: SURGERY | Facility: AMBULATORY SURGERY CENTER | Age: 64
End: 2024-10-09
Payer: COMMERCIAL

## 2024-10-09 ENCOUNTER — TELEPHONE (OUTPATIENT)
Dept: SURGERY | Facility: CLINIC | Age: 64
End: 2024-10-09
Payer: COMMERCIAL

## 2024-10-09 NOTE — TELEPHONE ENCOUNTER
Called patient and left voicemail to explain we need to postpone surgery scheduled for tomorrow, 10/10/24 with Dr. Garza due to the IV fluid shortage. Asked for a call back at [phone] to confirm.    Jennifer Gomez on 10/9/2024 at 8:37 AM

## 2024-10-09 NOTE — TELEPHONE ENCOUNTER
Called and left patient a second voicemail explaining his surgery does not need to be postponed, and to please arrive at his normal arrival time. Also sent [employer] message to patient.    Jennifer Gomez on 10/9/2024 at 9:02 AM

## 2024-10-10 ENCOUNTER — ANESTHESIA (OUTPATIENT)
Dept: SURGERY | Facility: AMBULATORY SURGERY CENTER | Age: 64
End: 2024-10-10
Payer: COMMERCIAL

## 2024-10-10 ENCOUNTER — HOSPITAL ENCOUNTER (OUTPATIENT)
Facility: AMBULATORY SURGERY CENTER | Age: 64
Discharge: HOME | End: 2024-10-10
Attending: SURGERY
Payer: COMMERCIAL

## 2024-10-10 VITALS
HEART RATE: 55 BPM | RESPIRATION RATE: 16 BRPM | SYSTOLIC BLOOD PRESSURE: 118 MMHG | OXYGEN SATURATION: 95 % | TEMPERATURE: 97.2 F | BODY MASS INDEX: 27.63 KG/M2 | WEIGHT: 204 LBS | DIASTOLIC BLOOD PRESSURE: 68 MMHG | HEIGHT: 72 IN

## 2024-10-10 DIAGNOSIS — K42.9 UMBILICAL HERNIA WITHOUT OBSTRUCTION AND WITHOUT GANGRENE: Primary | ICD-10-CM

## 2024-10-10 PROCEDURE — 49591 RPR AA HRN 1ST < 3 CM RDC: CPT | Performed by: ANESTHESIOLOGY

## 2024-10-10 PROCEDURE — 49593 RPR AA HRN 1ST 3-10 RDC: CPT | Performed by: SURGERY

## 2024-10-10 PROCEDURE — 49591 RPR AA HRN 1ST < 3 CM RDC: CPT | Performed by: NURSE ANESTHETIST, CERTIFIED REGISTERED

## 2024-10-10 DEVICE — MESH HERNIA ABDOMINAL SELF GRIPPING 15X9CM PROGRIP TEM1509G: Type: IMPLANTABLE DEVICE | Site: UMBILICAL | Status: FUNCTIONAL

## 2024-10-10 RX ORDER — KETAMINE HYDROCHLORIDE 10 MG/ML
INJECTION INTRAMUSCULAR; INTRAVENOUS PRN
Status: DISCONTINUED | OUTPATIENT
Start: 2024-10-10 | End: 2024-10-10

## 2024-10-10 RX ORDER — DEXAMETHASONE SODIUM PHOSPHATE 10 MG/ML
4 INJECTION, SOLUTION INTRAMUSCULAR; INTRAVENOUS
Status: DISCONTINUED | OUTPATIENT
Start: 2024-10-10 | End: 2024-10-12 | Stop reason: HOSPADM

## 2024-10-10 RX ORDER — CEFAZOLIN SODIUM 2 G/50ML
2 SOLUTION INTRAVENOUS SEE ADMIN INSTRUCTIONS
Status: DISCONTINUED | OUTPATIENT
Start: 2024-10-10 | End: 2024-10-10 | Stop reason: HOSPADM

## 2024-10-10 RX ORDER — PROPOFOL 10 MG/ML
INJECTION, EMULSION INTRAVENOUS CONTINUOUS PRN
Status: DISCONTINUED | OUTPATIENT
Start: 2024-10-10 | End: 2024-10-10

## 2024-10-10 RX ORDER — HYDROCODONE BITARTRATE AND ACETAMINOPHEN 5; 325 MG/1; MG/1
1-2 TABLET ORAL EVERY 4 HOURS PRN
Qty: 15 TABLET | Refills: 0 | Status: SHIPPED | OUTPATIENT
Start: 2024-10-10

## 2024-10-10 RX ORDER — HYDROMORPHONE HYDROCHLORIDE 1 MG/ML
0.2 INJECTION, SOLUTION INTRAMUSCULAR; INTRAVENOUS; SUBCUTANEOUS EVERY 5 MIN PRN
Status: DISCONTINUED | OUTPATIENT
Start: 2024-10-10 | End: 2024-10-12 | Stop reason: HOSPADM

## 2024-10-10 RX ORDER — KETOROLAC TROMETHAMINE 30 MG/ML
INJECTION, SOLUTION INTRAMUSCULAR; INTRAVENOUS PRN
Status: DISCONTINUED | OUTPATIENT
Start: 2024-10-10 | End: 2024-10-10

## 2024-10-10 RX ORDER — AMOXICILLIN 250 MG
1-2 CAPSULE ORAL 2 TIMES DAILY
Qty: 15 TABLET | Refills: 0 | Status: SHIPPED | OUTPATIENT
Start: 2024-10-10

## 2024-10-10 RX ORDER — ONDANSETRON 2 MG/ML
4 INJECTION INTRAMUSCULAR; INTRAVENOUS EVERY 30 MIN PRN
Status: DISCONTINUED | OUTPATIENT
Start: 2024-10-10 | End: 2024-10-12 | Stop reason: HOSPADM

## 2024-10-10 RX ORDER — ACETAMINOPHEN 325 MG/1
975 TABLET ORAL ONCE
Status: COMPLETED | OUTPATIENT
Start: 2024-10-10 | End: 2024-10-10

## 2024-10-10 RX ORDER — ONDANSETRON 2 MG/ML
INJECTION INTRAMUSCULAR; INTRAVENOUS PRN
Status: DISCONTINUED | OUTPATIENT
Start: 2024-10-10 | End: 2024-10-10

## 2024-10-10 RX ORDER — LIDOCAINE 40 MG/G
CREAM TOPICAL
Status: DISCONTINUED | OUTPATIENT
Start: 2024-10-10 | End: 2024-10-10 | Stop reason: HOSPADM

## 2024-10-10 RX ORDER — LABETALOL HYDROCHLORIDE 5 MG/ML
10 INJECTION, SOLUTION INTRAVENOUS
Status: DISCONTINUED | OUTPATIENT
Start: 2024-10-10 | End: 2024-10-12 | Stop reason: HOSPADM

## 2024-10-10 RX ORDER — PROPOFOL 10 MG/ML
INJECTION, EMULSION INTRAVENOUS PRN
Status: DISCONTINUED | OUTPATIENT
Start: 2024-10-10 | End: 2024-10-10

## 2024-10-10 RX ORDER — LIDOCAINE HYDROCHLORIDE 20 MG/ML
INJECTION, SOLUTION INFILTRATION; PERINEURAL PRN
Status: DISCONTINUED | OUTPATIENT
Start: 2024-10-10 | End: 2024-10-10

## 2024-10-10 RX ORDER — HYDROMORPHONE HYDROCHLORIDE 1 MG/ML
0.4 INJECTION, SOLUTION INTRAMUSCULAR; INTRAVENOUS; SUBCUTANEOUS EVERY 5 MIN PRN
Status: DISCONTINUED | OUTPATIENT
Start: 2024-10-10 | End: 2024-10-12 | Stop reason: HOSPADM

## 2024-10-10 RX ORDER — ONDANSETRON 4 MG/1
4 TABLET, ORALLY DISINTEGRATING ORAL EVERY 30 MIN PRN
Status: DISCONTINUED | OUTPATIENT
Start: 2024-10-10 | End: 2024-10-12 | Stop reason: HOSPADM

## 2024-10-10 RX ORDER — NALOXONE HYDROCHLORIDE 0.4 MG/ML
0.1 INJECTION, SOLUTION INTRAMUSCULAR; INTRAVENOUS; SUBCUTANEOUS
Status: DISCONTINUED | OUTPATIENT
Start: 2024-10-10 | End: 2024-10-12 | Stop reason: HOSPADM

## 2024-10-10 RX ORDER — OXYCODONE HYDROCHLORIDE 5 MG/1
5 TABLET ORAL
Status: DISCONTINUED | OUTPATIENT
Start: 2024-10-10 | End: 2024-10-12 | Stop reason: HOSPADM

## 2024-10-10 RX ORDER — BUPIVACAINE HYDROCHLORIDE 2.5 MG/ML
INJECTION, SOLUTION INFILTRATION; PERINEURAL PRN
Status: DISCONTINUED | OUTPATIENT
Start: 2024-10-10 | End: 2024-10-10 | Stop reason: HOSPADM

## 2024-10-10 RX ORDER — FENTANYL CITRATE 50 UG/ML
25 INJECTION, SOLUTION INTRAMUSCULAR; INTRAVENOUS EVERY 5 MIN PRN
Status: DISCONTINUED | OUTPATIENT
Start: 2024-10-10 | End: 2024-10-12 | Stop reason: HOSPADM

## 2024-10-10 RX ORDER — OXYCODONE HYDROCHLORIDE 5 MG/1
10 TABLET ORAL
Status: DISCONTINUED | OUTPATIENT
Start: 2024-10-10 | End: 2024-10-12 | Stop reason: HOSPADM

## 2024-10-10 RX ORDER — CEFAZOLIN SODIUM 2 G/50ML
2 SOLUTION INTRAVENOUS
Status: COMPLETED | OUTPATIENT
Start: 2024-10-10 | End: 2024-10-10

## 2024-10-10 RX ORDER — FENTANYL CITRATE 50 UG/ML
50 INJECTION, SOLUTION INTRAMUSCULAR; INTRAVENOUS EVERY 5 MIN PRN
Status: DISCONTINUED | OUTPATIENT
Start: 2024-10-10 | End: 2024-10-12 | Stop reason: HOSPADM

## 2024-10-10 RX ADMIN — ACETAMINOPHEN 975 MG: 325 TABLET ORAL at 08:01

## 2024-10-10 RX ADMIN — PROPOFOL 100 MCG/KG/MIN: 10 INJECTION, EMULSION INTRAVENOUS at 09:27

## 2024-10-10 RX ADMIN — PROPOFOL 20 MG: 10 INJECTION, EMULSION INTRAVENOUS at 09:49

## 2024-10-10 RX ADMIN — KETOROLAC TROMETHAMINE 30 MG: 30 INJECTION, SOLUTION INTRAMUSCULAR; INTRAVENOUS at 09:01

## 2024-10-10 RX ADMIN — LIDOCAINE HYDROCHLORIDE 100 MG: 20 INJECTION, SOLUTION INFILTRATION; PERINEURAL at 08:59

## 2024-10-10 RX ADMIN — PROPOFOL 30 MG: 10 INJECTION, EMULSION INTRAVENOUS at 09:02

## 2024-10-10 RX ADMIN — PROPOFOL 20 MG: 10 INJECTION, EMULSION INTRAVENOUS at 09:47

## 2024-10-10 RX ADMIN — KETAMINE HYDROCHLORIDE 10 MG: 10 INJECTION INTRAMUSCULAR; INTRAVENOUS at 09:00

## 2024-10-10 RX ADMIN — CEFAZOLIN SODIUM 2 G: 2 SOLUTION INTRAVENOUS at 08:57

## 2024-10-10 RX ADMIN — ONDANSETRON 4 MG: 2 INJECTION INTRAMUSCULAR; INTRAVENOUS at 09:01

## 2024-10-10 RX ADMIN — PROPOFOL 20 MG: 10 INJECTION, EMULSION INTRAVENOUS at 09:11

## 2024-10-10 RX ADMIN — PROPOFOL 150 MCG/KG/MIN: 10 INJECTION, EMULSION INTRAVENOUS at 09:00

## 2024-10-10 RX ADMIN — KETAMINE HYDROCHLORIDE 10 MG: 10 INJECTION INTRAMUSCULAR; INTRAVENOUS at 09:12

## 2024-10-10 ASSESSMENT — LIFESTYLE VARIABLES: TOBACCO_USE: 1

## 2024-10-10 NOTE — OP NOTE
Operative report    Preop diagnosis: umbilical hernia  Postop diagnosis: same    Operative procedure: open mesh repair umbilical hernia    Surgeon: SNEHAL Garza  Assistant: JOHNNY Fairchild  Anesthesia: mac    Indications for procedure: Patient presents with symptomatic umbilical hernia.  Full informed consent was obtain in clinic and reconfirmed in today's preoperative discussion.    Operative findings: 2 x 3 cm umbilical hernia    Procedure: Patient brought to the operating room and placed under IV sedation.  The abdomen was widely prepped and draped in usual sterile fashion.  Injected with Marcaine throughout for adequate anesthetic.  Umbilical skin incision was made and dissection carried down to the fascia.  Fascia was circumferentially cleared around the hernia defect.  The preperitoneal space was then entered and this allowed for reduction of the hernia sac into the preperitoneal space.  The preperitoneal space was then widely created to allow for placement of the pariah Antony ProGrip mesh.  Sizing of the defect was 2 x 3 cm and the mesh was 6 x 6 cm.  Was secured out laterally from the defect using a 2-0 PDS.  Fascial defect was then closed with a running 0 PDS.  Umbilical skin tacked down using a 3-0 Vicryl.  Then closed subcuticular.    Estimated blood loss: min  Pathology: None  The patient was taken to the recovery room where he was without difficulty or apparent complication.

## 2024-10-10 NOTE — ANESTHESIA CARE TRANSFER NOTE
Patient: Forest Sandoval    Procedure: Procedure(s):  HERNIORRHAPHY, UMBILICAL, OPEN       Diagnosis: Umbilical hernia without obstruction and without gangrene [K42.9]  Diagnosis Additional Information: No value filed.    Anesthesia Type:   MAC     Note:    Oropharynx: oropharynx clear of all foreign objects and spontaneously breathing  Level of Consciousness: awake  Oxygen Supplementation: room air    Independent Airway: airway patency satisfactory and stable    Vital Signs Stable: post-procedure vital signs reviewed and stable  Report to RN Given: handoff report given  Patient transferred to: Phase II    Handoff Report: Identifed the Patient, Identified the Reponsible Provider, Reviewed the pertinent medical history, Discussed the surgical course, Reviewed Intra-OP anesthesia mangement and issues during anesthesia, Set expectations for post-procedure period and Allowed opportunity for questions and acknowledgement of understanding      Vitals:  Vitals Value Taken Time   /71 10/10/24 1000   Temp 36.1  C (96.9  F) 10/10/24 1000   Pulse 68 10/10/24 1000   Resp 16 10/10/24 1000   SpO2 93 % 10/10/24 1000       Electronically Signed By: DYLAN Ng CRNA  October 10, 2024  10:03 AM

## 2024-10-10 NOTE — ANESTHESIA POSTPROCEDURE EVALUATION
Patient: Forest Sandoval    Procedure: Procedure(s):  HERNIORRHAPHY, UMBILICAL, OPEN       Anesthesia Type:  MAC    Note:  Disposition: Outpatient   Postop Pain Control: Uneventful            Sign Out: Well controlled pain   PONV: No   Neuro/Psych: Uneventful            Sign Out: Acceptable/Baseline neuro status   Airway/Respiratory: Uneventful            Sign Out: Acceptable/Baseline resp. status   CV/Hemodynamics: Uneventful            Sign Out: Acceptable CV status   Other NRE: NONE   DID A NON-ROUTINE EVENT OCCUR? No           Last vitals:  Vitals Value Taken Time   /68 10/10/24 1015   Temp 36.2  C (97.2  F) 10/10/24 1015   Pulse 55 10/10/24 1015   Resp 16 10/10/24 1015   SpO2 95 % 10/10/24 1015       Electronically Signed By: Frantz Briggs MD  October 10, 2024  12:31 PM

## 2024-10-10 NOTE — ANESTHESIA PREPROCEDURE EVALUATION
Anesthesia Pre-Procedure Evaluation    Patient: Forest Sandoval   MRN: [id number] : 1960        Procedure : Procedure(s):  HERNIORRHAPHY, UMBILICAL, OPEN          Past Medical History:   Diagnosis Date     Cancer (H)     prostate cancer      Past Surgical History:   Procedure Laterality Date     COLONOSCOPY N/A 2015    Procedure: COLONOSCOPY;  Surgeon: Dez Pickard MD;  Location: [facility]     COLONOSCOPY N/A 2/3/2023    Procedure: COLONOSCOPY;  Surgeon: Raghav Lee DO;  Location: [facility]     COLONOSCOPY WITH CO2 INSUFFLATION N/A 2015    Procedure: COLONOSCOPY WITH CO2 INSUFFLATION;  Surgeon: Dez Pickard MD;  Location: [facility] OR     NO HISTORY OF SURGERY        Allergies   Allergen Reactions     Contrast Dye Other (See Comments)     LW CM1: >>> NO CONTRAST ADVERSE REACTION <<<     Reaction :     Sulfa Antibiotics Unknown     Tobramycin Unknown      Social History     Tobacco Use     Smoking status: Former     Current packs/day: 0.00     Average packs/day: 1 pack/day for 24.0 years (24.0 ttl pk-yrs)     Types: Cigarettes     Start date: 1976     Quit date: 2000     Years since quittin.4     Passive exposure: Past     Smokeless tobacco: Never     Tobacco comments:     1 ppd x 24 yr, quit smoking in    Substance Use Topics     Alcohol use: Not Currently      Wt Readings from Last 1 Encounters:   10/10/24 92.5 kg (204 lb)        Anesthesia Evaluation   Pt has had prior anesthetic. Type: MAC.        ROS/MED HX  ENT/Pulmonary:     (+)                tobacco use, Past use,                       Neurologic:  - neg neurologic ROS     Cardiovascular:     (+) Dyslipidemia - -   -  - -                                      METS/Exercise Tolerance:     Hematologic:  - neg hematologic  ROS     Musculoskeletal:   (+)  arthritis,             GI/Hepatic:  - neg GI/hepatic ROS     Renal/Genitourinary:     (+)        BPH,      Endo:       Psychiatric/Substance Use:     (+) psychiatric  "history anxiety       Infectious Disease:  - neg infectious disease ROS     Malignancy:   (+) Malignancy, History of Prostate.    Other:  - neg other ROS          Physical Exam    Airway        Mallampati: I   TM distance: > 3 FB   Neck ROM: full   Mouth opening: > 3 cm    Respiratory Devices and Support         Dental       (+) Modest Abnormalities - crowns, retainers, 1 or 2 missing teeth      Cardiovascular   cardiovascular exam normal          Pulmonary   pulmonary exam normal              OUTSIDE LABS:  CBC:   Lab Results   Component Value Date    WBC 6.3 10/18/2019    HGB 14.2 10/18/2019    HCT 42.6 10/18/2019     10/18/2019     BMP:   Lab Results   Component Value Date     03/08/2022     10/18/2019    POTASSIUM 5.1 03/08/2022    POTASSIUM 4.0 10/18/2019    CHLORIDE 104 03/08/2022    CHLORIDE 106 10/18/2019    CO2 28 03/08/2022    CO2 26 10/18/2019    BUN 23 03/08/2022    BUN 22 10/18/2019    CR 1.02 03/08/2022    CR 0.98 10/18/2019     (H) 11/24/2023    GLC 86 03/08/2022     COAGS: No results found for: \"PTT\", \"INR\", \"FIBR\"  POC: No results found for: \"BGM\", \"HCG\", \"HCGS\"  HEPATIC:   Lab Results   Component Value Date    ALBUMIN 3.7 03/08/2022    PROTTOTAL 7.6 03/08/2022    ALT 33 04/14/2022    AST 29 04/14/2022    ALKPHOS 76 03/08/2022    BILITOTAL 0.4 03/08/2022     OTHER:   Lab Results   Component Value Date    KIKA 9.1 03/08/2022    TSH 3.11 11/24/2023       Anesthesia Plan    ASA Status:  3    NPO Status:  NPO Appropriate    Anesthesia Type: MAC.     - Reason for MAC: immobility needed, straight local not clinically adequate   Induction: Propofol, Intravenous.   Maintenance: TIVA.        Consents    Anesthesia Plan(s) and associated risks, benefits, and realistic alternatives discussed. Questions answered and patient/representative(s) expressed understanding.     - Discussed:     - Discussed with:  Patient      - Extended Intubation/Ventilatory Support Discussed: No.      - " Patient is DNR/DNI Status: No     Use of blood products discussed: No .     Postoperative Care    Pain management: IV analgesics, Oral pain medications, Multi-modal analgesia.   PONV prophylaxis: Ondansetron (or other 5HT-3), Background Propofol Infusion     Comments:               Jose Ragsdale MD    I have reviewed the pertinent notes and labs in the chart from the past 30 days and (re)examined the patient.  Any updates or changes from those notes are reflected in this note.                       # Overweight: Estimated body mass index is 27.67 kg/m  as calculated from the following:    Height as of this encounter: 1.829 m (6').    Weight as of this encounter: 92.5 kg (204 lb).

## 2024-10-10 NOTE — DISCHARGE INSTRUCTIONS
"[facility]  Home Care Following Anesthesia  For 24 hours after surgery:  Get plenty of rest.  A responsible adult must stay with you for at least 24 hours after you leave the surgery center.  Do not drive or use heavy equipment.  If you have weakness or tingling, don't drive or use heavy equipment until this feeling goes away.   Do not drink alcohol.   Avoid strenuous or risky activities.  Ask for help when climbing stairs.  You may feel lightheaded.  IF so, sit for a few minutes before standing.  Have someone help you get up.   If you have nausea (feel sick to your stomach): Drink only clear liquids such as apple juice, ginger ale, broth or 7-Up.  Rest may also help.  Be sure to drink enough fluids.  Move to a regular diet as you feel able.   You may have a slight fever.  Call the doctor if your fever is over 100 F (37.7 C) (taken under the tongue) or lasts longer than 24 hours.  You may have a dry mouth, a sore throat, muscle aches or trouble sleeping. These should go away after 24 hours.  Do not make important or legal decisions.   It is recommended to avoid smoking.        Today you received a Marcaine or bupivacaine block to numb the nerves near your surgery site.  This is a block using local anesthetic or \"numbing\" medication injected around the nerves to anesthetize or \"numb\" the area supplied by those nerves.  This block is injected into the muscle layer near your surgical site.  The medication may numb the location where you had surgery for 6-18 hours, but may last up to 24 hours.  If your surgical site is an arm or leg you should be careful with your affected limb, since it is possible to injure your limb without being aware of it due to the numbing.  Until full feeling returns, you should guard against bumping or hitting your limb, and avoid extreme hot or cold temperatures on the skin.  As the block wears off, the feeling will return as a tingling or prickly " sensation near your surgical site.  You will experience more discomfort from your incision as the feeling returns.  You may want to take a pain pill (a narcotic or Tylenol if this was prescribed by your surgeon) when you start to experience mild pain before the pain beccomes more severe.  If your pain medications do not control your pain you should notifiy your surgeon.    Tips for taking pain medications  To get the best pain relief possible, remember these points:  Take pain medications as directed, before pain becomes severe.  Pain medication can upset your stomach: taking it with food may help.  Constipation is a common side effect of pain medication. Drink plenty of  fluids.  Eat foods high in fiber. Take a stool softener if recommended by your doctor or pharmacist.  Do not drink alcohol, drive or operate machinery while taking pain medications.  Ask about other ways to control pain, such as with heat, ice or relaxation.    Tylenol/Acetaminophen Consumption    If you feel your pain relief is insufficient, you may take Tylenol/Acetaminophen in addition to your narcotic pain medication.   Be careful not to exceed 4,000 mg of Tylenol/Acetaminophen in a 24 hour period from all sources.  If you are taking extra strength Tylenol/acetaminophen (500 mg), the maximum dose is 8 tablets in 24 hours.  If you are taking regular strength acetaminophen (325 mg), the maximum dose is 12 tablets in 24 hours.    Tylenol 975 mg given at 8:00 AM.  Ok to take more after 2:00 PM.   Toradol 30 mg given at  9:00 AM.  Do not take any NSAIDs for 6 hours.  OK after 3:00 PM.  (Ibuprofen, Advil, Motrin, Naproxen, Aleve).         Call a doctor for any of the following:  Signs of infection (fever, growing tenderness at the surgery site, a large amount of drainage or bleeding, severe pain, foul-smelling drainage, redness, swelling).  It has been over 8 to 10 hours since surgery and you are still not able to urinate (pass water).  Headache for  over 24 hours.  Signs of Covid-19 infection (temperature over 100 degrees, shortness of breath, cough, loss of taste/smell, generalized body aches, persistent headache, chills, sore throat, nausea/vomiting/diarrhea)  Your doctor is:  Dr. Elver Garza, General Surgery: [phone]  Or dial [phone] and ask for the resident on call for:  General Surgery  For emergency care, call the:  [facility]:  [phone] (TTY for hearing impaired: [phone])

## 2024-10-14 ENCOUNTER — PATIENT OUTREACH (OUTPATIENT)
Dept: SURGERY | Facility: CLINIC | Age: 64
End: 2024-10-14
Payer: COMMERCIAL

## 2024-10-14 NOTE — PROGRESS NOTES
RN Post-Op/Post-Discharge Care Coordination Note    Mr. Forest Sandoval is a 64 year old male who underwent open ventral/umbilical hernia repair on 10/10 with  Dr. Elver Garza.  Spoke with Patient.    Support  Patient able to care for self independently.     Health Status  Nausea/Vomiting: Patient denies nausea/vomiting.  Eating/drinking: Patient is able to eat and drink without any complaints.  Diet:  Regular  Bowel habits: Patient reports having a normal bowel movement.  Fevers/chills: Patient denies any fever or chills.  Incisions: Patient denies any signs and symptoms of infection. Wound closure: Steri-strips. He does have some swelling above his umbilicus. Discussed use of heat for 20min intervals protecting skin from injury.   Pain: tolerable without the use of pain medications.   New Medications:  Norco, Senokot S    Activity/Restrictions  No lifting in excess of 15-20 pounds for 3-4 weeks    Equipment  Abdominal binder    Pathology reviewed with patient:  N/A    Forms/Letters  No    All of his questions were answered including reviewing restrictions and wound care.  He will call this office if he has any further questions and/or concerns.      In lieu of a post-op clinic patient that patient would like to be contacted in approximately 7-10 days via telephone.    A copy of this note was routed to the primary surgeon.    Whom and When to Call  Patient acknowledges understanding of how to manage any medication changes and when to seek medical care.     Patient advised that if after hour medical concerns arise to please call [phone] and choose option 4 to speak to the physician on call.

## 2024-10-21 ENCOUNTER — PATIENT OUTREACH (OUTPATIENT)
Dept: SURGERY | Facility: CLINIC | Age: 64
End: 2024-10-21
Payer: COMMERCIAL

## 2024-10-21 NOTE — PROGRESS NOTES
Forest Sandoval was contacted several days post procedure via telephone for a status update and elected to have a telephone follow -up call approximately 7-10 days after original contact in lieu of a clinic visit with Dr. Elver Garza.  He continues to do well and the steri-strips  have not peeled off.  The patients wounds are healed and the area is C/D/I.  He is afebrile, pain free, and jose m po; the patient is slowly resuming his normal activity.   Discussed restrictions including no lifting in excess of 15-20 pounds for 3 weeks.    Pathology was reviewed with the patient: N/A    All of Forest Sandoval question's were answered and  he would like to return to the clinic on a PRN basis.  The patient is aware that  he may schedule a post op appointment at any time.    A copy of this note was routed to the patients surgeon.

## 2024-10-21 NOTE — PROGRESS NOTES
10/21/24    9:52 AM      Forest Sandoval is a patient of Dr. Elver Garza that recently underwent a surgical procedure (open ventral/umbilical hernia repair).  The patient was contacted via telephone approximately 7-10 days ago for a status update and post-op teaching.  In lieu of a clinic visit, the patient requested to be contacted at a later date by an RN for an assessment.    Attempted to contact patient via telephone for a status update.  LM on VM to call office.

## 2024-10-25 ENCOUNTER — PATIENT OUTREACH (OUTPATIENT)
Dept: CARE COORDINATION | Facility: CLINIC | Age: 64
End: 2024-10-25
Payer: COMMERCIAL

## 2024-11-08 ENCOUNTER — PATIENT OUTREACH (OUTPATIENT)
Dept: CARE COORDINATION | Facility: CLINIC | Age: 64
End: 2024-11-08
Payer: COMMERCIAL

## 2024-11-12 ASSESSMENT — ANXIETY QUESTIONNAIRES
GAD7 TOTAL SCORE: 0
3. WORRYING TOO MUCH ABOUT DIFFERENT THINGS: NOT AT ALL
6. BECOMING EASILY ANNOYED OR IRRITABLE: NOT AT ALL
IF YOU CHECKED OFF ANY PROBLEMS ON THIS QUESTIONNAIRE, HOW DIFFICULT HAVE THESE PROBLEMS MADE IT FOR YOU TO DO YOUR WORK, TAKE CARE OF THINGS AT HOME, OR GET ALONG WITH OTHER PEOPLE: NOT DIFFICULT AT ALL
2. NOT BEING ABLE TO STOP OR CONTROL WORRYING: NOT AT ALL
GAD7 TOTAL SCORE: 0
7. FEELING AFRAID AS IF SOMETHING AWFUL MIGHT HAPPEN: NOT AT ALL
5. BEING SO RESTLESS THAT IT IS HARD TO SIT STILL: NOT AT ALL
7. FEELING AFRAID AS IF SOMETHING AWFUL MIGHT HAPPEN: NOT AT ALL
GAD7 TOTAL SCORE: 0
8. IF YOU CHECKED OFF ANY PROBLEMS, HOW DIFFICULT HAVE THESE MADE IT FOR YOU TO DO YOUR WORK, TAKE CARE OF THINGS AT HOME, OR GET ALONG WITH OTHER PEOPLE?: NOT DIFFICULT AT ALL
4. TROUBLE RELAXING: NOT AT ALL
1. FEELING NERVOUS, ANXIOUS, OR ON EDGE: NOT AT ALL

## 2024-11-15 ENCOUNTER — OFFICE VISIT (OUTPATIENT)
Dept: FAMILY MEDICINE | Facility: CLINIC | Age: 64
End: 2024-11-15
Payer: COMMERCIAL

## 2024-11-15 VITALS
DIASTOLIC BLOOD PRESSURE: 70 MMHG | HEART RATE: 73 BPM | TEMPERATURE: 97.7 F | WEIGHT: 204.4 LBS | BODY MASS INDEX: 28.61 KG/M2 | SYSTOLIC BLOOD PRESSURE: 114 MMHG | HEIGHT: 71 IN | OXYGEN SATURATION: 95 % | RESPIRATION RATE: 18 BRPM

## 2024-11-15 DIAGNOSIS — E78.5 HYPERLIPIDEMIA LDL GOAL <130: ICD-10-CM

## 2024-11-15 DIAGNOSIS — Z01.818 PREOP GENERAL PHYSICAL EXAM: Primary | ICD-10-CM

## 2024-11-15 DIAGNOSIS — Z85.46 HISTORY OF PROSTATE CANCER: ICD-10-CM

## 2024-11-15 NOTE — PATIENT INSTRUCTIONS
How to Take Your Medication Before Surgery  Preoperative Medication Instructions   Antiplatelet or Anticoagulation Medication Instructions   - Patient is on no antiplatelet or anticoagulation medications.    Additional Medication Instructions  Take all scheduled medications on the day of surgery EXCEPT for modifications listed below:   - Herbal medications and vitamins: DO NOT TAKE 14 days prior to surgery.   - ibuprofen (Advil, Motrin): DO NOT TAKE 1 day before surgery.    - naproxen (Aleve, Naprosyn): DO NOT TAKE 4 days before surgery.        Patient Education   Preparing for Your Surgery  For Adults  Getting started  In most cases, a nurse will call to review your health history and instructions. They will give you an arrival time based on your scheduled surgery time. Please be ready to share:  Your doctor's clinic name and phone number  Your medical, surgical, and anesthesia history  A list of allergies and sensitivities  A list of medicines, including herbal treatments and over-the-counter drugs  Whether the patient has a legal guardian (ask how to send us the papers in advance)  Note: You may not receive a call if you were seen at our PAC (Preoperative Assessment Center).  Please tell us if you're pregnant--or if there's any chance you might be pregnant. Some surgeries may injure a fetus (unborn baby), so they require a pregnancy test. Surgeries that are safe for a fetus don't always need a test, and you can choose whether to have one.   Preparing for surgery  Within 10 to 30 days of surgery: Have a pre-op exam (sometimes called an H&P, or History and Physical). This can be done at a clinic or pre-operative center.  If you're having a , you may not need this exam. Talk to your care team.  At your pre-op exam, talk to your care team about all medicines you take. (This includes CBD oil and any drugs, such as THC, marijuana, and other forms of cannabis.) If you need to stop any medicine before surgery,  ask when to start taking it again.  This is for your safety. Many medicines and drugs can make you bleed too much during surgery. Some change how well surgery (anesthesia) drugs work.  Call your insurance company to let them know you're having surgery. (If you don't have insurance, call [phone].)  Call your clinic if there's any change in your health. This includes a scrape or scratch near the surgery site, or any signs of a cold (sore throat, runny nose, cough, rash, fever).  Eating and drinking guidelines  For your safety: Unless your surgeon tells you otherwise, follow the guidelines below.  Eat and drink as normal until 8 hours before you arrive for surgery. After that, no food or milk. You can spit out gum when you arrive.  Drink clear liquids until 2 hours before you arrive. These are liquids you can see through, like water, Gatorade, and Propel Water. They also include plain black coffee and tea (no cream or milk).  No alcohol for 24 hours before you arrive. The night before surgery, stop any drinks that contain THC.  If your care team tells you to take medicine on the morning of surgery, it's okay to take it with a sip of water. No other medicines or drugs are allowed (including CBD oil)--follow your care team's instructions.  If you have questions the day of surgery, call your hospital or surgery center.   Preventing infection  Shower or bathe the night before and the morning of surgery. Follow the instructions your clinic gave you. (If no instructions, use regular soap.)  Don't shave or clip hair near your surgery site. We'll remove the hair if needed.  Don't smoke or vape the morning of surgery. No chewing tobacco for 6 hours before you arrive. A nicotine patch is okay. You may spit out nicotine gum when you arrive.  For some surgeries, the surgeon will tell you to fully quit smoking and nicotine.  We will make every effort to keep you safe from infection. We will:  Clean our hands often with soap  and water (or an alcohol-based hand rub).  Clean the skin at your surgery site with a special soap that kills germs.  Give you a special gown to keep you warm. (Cold raises the risk of infection.)  Wear hair covers, masks, gowns, and gloves during surgery.  Give antibiotic medicine, if prescribed. Not all surgeries need this medicine.  What to bring on the day of surgery  Photo ID and insurance card  Copy of your health care directive, if you have one  Glasses and hearing aids (bring cases)  You can't wear contacts during surgery  Inhaler and eye drops, if you use them (tell us about these when you arrive)  CPAP machine or breathing device, if you use them  A few personal items, if spending the night  If you have . . .  A pacemaker, ICD (cardiac defibrillator), or other implant: Bring the ID card.  An implanted stimulator: Bring the remote control.  A legal guardian: Bring a copy of the certified (court-stamped) guardianship papers.  Please remove any jewelry, including body piercings. Leave jewelry and other valuables at home.  If you're going home the day of surgery  You must have a responsible adult drive you home. They should stay with you overnight as well.  If you don't have someone to stay with you, and you aren't safe to go home alone, we may keep you overnight. Insurance often won't pay for this.  After surgery  If it's hard to control your pain or you need more pain medicine, please call your surgeon's office.  Questions?   If you have any questions for your care team, list them here:   ____________________________________________________________________________________________________________________________________________________________________________________________________________________________________________________________  For informational purposes only. Not to replace the advice of your health care provider. Copyright   2003, 2019 [facility]. All rights reserved. Clinically  reviewed by Franck Weinberg MD. [employer] [id number] - REV 08/24.

## 2024-11-20 ENCOUNTER — CARE COORDINATION (OUTPATIENT)
Dept: UROLOGY | Facility: CLINIC | Age: 64
End: 2024-11-20
Payer: COMMERCIAL

## 2024-12-08 DIAGNOSIS — K42.9 UMBILICAL HERNIA WITHOUT OBSTRUCTION AND WITHOUT GANGRENE: ICD-10-CM

## 2024-12-08 DIAGNOSIS — E78.5 HYPERLIPIDEMIA LDL GOAL <130: ICD-10-CM

## 2024-12-09 NOTE — PROGRESS NOTES
PTA medications updated by Medication Scribe prior to surgery via phone call with patient (last doses completed by Nurse)     Medication history sources: Patient, Surescripts, and H&P  In the past week, patient estimated taking medication this percent of the time: Greater than 90%      Significant changes made to the medication list:  None      Additional medication history information:   None    Medication reconciliation completed by provider prior to medication history? No    Time spent in this activity: 10 minutes    The information provided in this note is only as accurate as the sources available at the time of update(s)      Prior to Admission medications    Medication Sig Last Dose Taking? Auth Provider Long Term End Date   melatonin 3 MG tablet Take 3 mg by mouth nightly as needed for sleep.  Yes Reported, Patient     rosuvastatin (CRESTOR) 10 MG tablet Take 1 tablet (10 mg) by mouth daily Morning Yes Roxy Montoya NP Yes        Medication history completed by: Seth Cotto

## 2024-12-10 RX ORDER — ROSUVASTATIN CALCIUM 10 MG/1
10 TABLET, COATED ORAL DAILY
Qty: 30 TABLET | Refills: 11 | Status: SHIPPED | OUTPATIENT
Start: 2024-12-10

## 2024-12-13 ENCOUNTER — HOSPITAL ENCOUNTER (OUTPATIENT)
Facility: CLINIC | Age: 64
Discharge: HOME | End: 2024-12-14
Attending: UROLOGY | Admitting: UROLOGY
Payer: COMMERCIAL

## 2024-12-13 DIAGNOSIS — Z85.46 HISTORY OF PROSTATE CANCER: Primary | ICD-10-CM

## 2024-12-13 LAB
ABO + RH BLD: NORMAL
ANION GAP SERPL CALCULATED.3IONS-SCNC: 9 MMOL/L (ref 7–15)
BLD GP AB SCN SERPL QL: NEGATIVE
BUN SERPL-MCNC: 18.2 MG/DL (ref 8–23)
CALCIUM SERPL-MCNC: 9 MG/DL (ref 8.8–10.4)
CHLORIDE SERPL-SCNC: 102 MMOL/L (ref 98–107)
CREAT SERPL-MCNC: 0.94 MG/DL (ref 0.67–1.17)
EGFRCR SERPLBLD CKD-EPI 2021: >90 ML/MIN/1.73M2
GLUCOSE SERPL-MCNC: 136 MG/DL (ref 70–99)
GLUCOSE SERPL-MCNC: 98 MG/DL (ref 70–99)
HCO3 SERPL-SCNC: 24 MMOL/L (ref 22–29)
HGB BLD-MCNC: 12.4 G/DL (ref 13.3–17.7)
HGB BLD-MCNC: 13.7 G/DL (ref 13.3–17.7)
PLATELET # BLD AUTO: 219 10E3/UL (ref 150–450)
POTASSIUM SERPL-SCNC: 4.6 MMOL/L (ref 3.4–5.3)
SODIUM SERPL-SCNC: 135 MMOL/L (ref 135–145)
SPECIMEN EXP DATE BLD: NORMAL

## 2024-12-13 PROCEDURE — 85018 HEMOGLOBIN: CPT | Performed by: ANESTHESIOLOGY

## 2024-12-13 PROCEDURE — 258N000001 HC RX 258: Performed by: UROLOGY

## 2024-12-13 PROCEDURE — 999N000141 HC STATISTIC PRE-PROCEDURE NURSING ASSESSMENT: Performed by: UROLOGY

## 2024-12-13 PROCEDURE — 250N000011 HC RX IP 250 OP 636: Performed by: UROLOGY

## 2024-12-13 PROCEDURE — 360N000080 HC SURGERY LEVEL 7, PER MIN: Performed by: UROLOGY

## 2024-12-13 PROCEDURE — 84520 ASSAY OF UREA NITROGEN: CPT

## 2024-12-13 PROCEDURE — 86900 BLOOD TYPING SEROLOGIC ABO: CPT | Performed by: UROLOGY

## 2024-12-13 PROCEDURE — 85018 HEMOGLOBIN: CPT

## 2024-12-13 PROCEDURE — 85049 AUTOMATED PLATELET COUNT: CPT

## 2024-12-13 PROCEDURE — 82565 ASSAY OF CREATININE: CPT

## 2024-12-13 PROCEDURE — 710N000009 HC RECOVERY PHASE 1, LEVEL 1, PER MIN: Performed by: UROLOGY

## 2024-12-13 PROCEDURE — 250N000011 HC RX IP 250 OP 636: Performed by: ANESTHESIOLOGY

## 2024-12-13 PROCEDURE — 250N000013 HC RX MED GY IP 250 OP 250 PS 637: Performed by: UROLOGY

## 2024-12-13 PROCEDURE — 80048 BASIC METABOLIC PNL TOTAL CA: CPT | Performed by: ANESTHESIOLOGY

## 2024-12-13 PROCEDURE — 258N000003 HC RX IP 258 OP 636: Performed by: ANESTHESIOLOGY

## 2024-12-13 PROCEDURE — 370N000017 HC ANESTHESIA TECHNICAL FEE, PER MIN: Performed by: UROLOGY

## 2024-12-13 PROCEDURE — 38571 LAPAROSCOPY LYMPHADENECTOMY: CPT | Performed by: UROLOGY

## 2024-12-13 PROCEDURE — 250N000009 HC RX 250: Performed by: UROLOGY

## 2024-12-13 PROCEDURE — 82947 ASSAY GLUCOSE BLOOD QUANT: CPT

## 2024-12-13 PROCEDURE — 258N000003 HC RX IP 258 OP 636

## 2024-12-13 PROCEDURE — 55866 LAPS SURG PRST8ECT RPBIC RAD: CPT | Performed by: UROLOGY

## 2024-12-13 PROCEDURE — 36415 COLL VENOUS BLD VENIPUNCTURE: CPT | Performed by: ANESTHESIOLOGY

## 2024-12-13 PROCEDURE — 88309 TISSUE EXAM BY PATHOLOGIST: CPT | Mod: TC | Performed by: UROLOGY

## 2024-12-13 PROCEDURE — 250N000013 HC RX MED GY IP 250 OP 250 PS 637

## 2024-12-13 PROCEDURE — 250N000025 HC SEVOFLURANE, PER MIN: Performed by: UROLOGY

## 2024-12-13 PROCEDURE — 36415 COLL VENOUS BLD VENIPUNCTURE: CPT

## 2024-12-13 PROCEDURE — 88307 TISSUE EXAM BY PATHOLOGIST: CPT | Mod: TC | Performed by: UROLOGY

## 2024-12-13 PROCEDURE — 272N000001 HC OR GENERAL SUPPLY STERILE: Performed by: UROLOGY

## 2024-12-13 RX ORDER — OXYCODONE HYDROCHLORIDE 5 MG/1
10 TABLET ORAL EVERY 4 HOURS PRN
Status: DISCONTINUED | OUTPATIENT
Start: 2024-12-13 | End: 2024-12-14 | Stop reason: HOSPADM

## 2024-12-13 RX ORDER — HEPARIN SODIUM 5000 [USP'U]/.5ML
5000 INJECTION, SOLUTION INTRAVENOUS; SUBCUTANEOUS EVERY 8 HOURS
Status: DISCONTINUED | OUTPATIENT
Start: 2024-12-14 | End: 2024-12-14 | Stop reason: HOSPADM

## 2024-12-13 RX ORDER — ACETAMINOPHEN 325 MG/1
975 TABLET ORAL ONCE
Status: COMPLETED | OUTPATIENT
Start: 2024-12-13 | End: 2024-12-13

## 2024-12-13 RX ORDER — LABETALOL HYDROCHLORIDE 5 MG/ML
10 INJECTION, SOLUTION INTRAVENOUS
Status: DISCONTINUED | OUTPATIENT
Start: 2024-12-13 | End: 2024-12-13 | Stop reason: HOSPADM

## 2024-12-13 RX ORDER — HYDROMORPHONE HCL IN WATER/PF 6 MG/30 ML
0.4 PATIENT CONTROLLED ANALGESIA SYRINGE INTRAVENOUS EVERY 5 MIN PRN
Status: DISCONTINUED | OUTPATIENT
Start: 2024-12-13 | End: 2024-12-13 | Stop reason: HOSPADM

## 2024-12-13 RX ORDER — ONDANSETRON 2 MG/ML
4 INJECTION INTRAMUSCULAR; INTRAVENOUS EVERY 30 MIN PRN
Status: DISCONTINUED | OUTPATIENT
Start: 2024-12-13 | End: 2024-12-13 | Stop reason: HOSPADM

## 2024-12-13 RX ORDER — ONDANSETRON 4 MG/1
4 TABLET, ORALLY DISINTEGRATING ORAL EVERY 6 HOURS PRN
Status: DISCONTINUED | OUTPATIENT
Start: 2024-12-13 | End: 2024-12-14 | Stop reason: HOSPADM

## 2024-12-13 RX ORDER — ONDANSETRON 4 MG/1
4 TABLET, ORALLY DISINTEGRATING ORAL EVERY 30 MIN PRN
Status: DISCONTINUED | OUTPATIENT
Start: 2024-12-13 | End: 2024-12-13 | Stop reason: HOSPADM

## 2024-12-13 RX ORDER — LIDOCAINE 40 MG/G
CREAM TOPICAL
Status: DISCONTINUED | OUTPATIENT
Start: 2024-12-13 | End: 2024-12-14 | Stop reason: HOSPADM

## 2024-12-13 RX ORDER — SIMETHICONE 80 MG
80 TABLET,CHEWABLE ORAL 4 TIMES DAILY PRN
Status: DISCONTINUED | OUTPATIENT
Start: 2024-12-13 | End: 2024-12-14 | Stop reason: HOSPADM

## 2024-12-13 RX ORDER — NALOXONE HYDROCHLORIDE 0.4 MG/ML
0.4 INJECTION, SOLUTION INTRAMUSCULAR; INTRAVENOUS; SUBCUTANEOUS
Status: DISCONTINUED | OUTPATIENT
Start: 2024-12-13 | End: 2024-12-14 | Stop reason: HOSPADM

## 2024-12-13 RX ORDER — HEPARIN SODIUM 5000 [USP'U]/.5ML
5000 INJECTION, SOLUTION INTRAVENOUS; SUBCUTANEOUS
Status: COMPLETED | OUTPATIENT
Start: 2024-12-13 | End: 2024-12-13

## 2024-12-13 RX ORDER — OXYCODONE HYDROCHLORIDE 5 MG/1
5 TABLET ORAL EVERY 6 HOURS PRN
Qty: 12 TABLET | Refills: 0 | Status: SHIPPED | OUTPATIENT
Start: 2024-12-13 | End: 2024-12-16

## 2024-12-13 RX ORDER — CALCIUM CARBONATE 500 MG/1
1000 TABLET, CHEWABLE ORAL 3 TIMES DAILY PRN
Status: DISCONTINUED | OUTPATIENT
Start: 2024-12-13 | End: 2024-12-14 | Stop reason: HOSPADM

## 2024-12-13 RX ORDER — OXYCODONE HYDROCHLORIDE 5 MG/1
5 TABLET ORAL EVERY 4 HOURS PRN
Status: DISCONTINUED | OUTPATIENT
Start: 2024-12-13 | End: 2024-12-14 | Stop reason: HOSPADM

## 2024-12-13 RX ORDER — SODIUM CHLORIDE, SODIUM LACTATE, POTASSIUM CHLORIDE, CALCIUM CHLORIDE 600; 310; 30; 20 MG/100ML; MG/100ML; MG/100ML; MG/100ML
INJECTION, SOLUTION INTRAVENOUS CONTINUOUS
Status: DISCONTINUED | OUTPATIENT
Start: 2024-12-13 | End: 2024-12-13 | Stop reason: HOSPADM

## 2024-12-13 RX ORDER — HYDROXYZINE HYDROCHLORIDE 25 MG/1
25 TABLET, FILM COATED ORAL EVERY 6 HOURS PRN
Status: DISCONTINUED | OUTPATIENT
Start: 2024-12-13 | End: 2024-12-13 | Stop reason: HOSPADM

## 2024-12-13 RX ORDER — HYDROMORPHONE HCL IN WATER/PF 6 MG/30 ML
0.2 PATIENT CONTROLLED ANALGESIA SYRINGE INTRAVENOUS
Status: DISCONTINUED | OUTPATIENT
Start: 2024-12-13 | End: 2024-12-14 | Stop reason: HOSPADM

## 2024-12-13 RX ORDER — HYDROMORPHONE HCL IN WATER/PF 6 MG/30 ML
0.4 PATIENT CONTROLLED ANALGESIA SYRINGE INTRAVENOUS
Status: DISCONTINUED | OUTPATIENT
Start: 2024-12-13 | End: 2024-12-14 | Stop reason: HOSPADM

## 2024-12-13 RX ORDER — NALOXONE HYDROCHLORIDE 0.4 MG/ML
0.2 INJECTION, SOLUTION INTRAMUSCULAR; INTRAVENOUS; SUBCUTANEOUS
Status: DISCONTINUED | OUTPATIENT
Start: 2024-12-13 | End: 2024-12-14 | Stop reason: HOSPADM

## 2024-12-13 RX ORDER — HYDROMORPHONE HCL IN WATER/PF 6 MG/30 ML
0.2 PATIENT CONTROLLED ANALGESIA SYRINGE INTRAVENOUS EVERY 5 MIN PRN
Status: DISCONTINUED | OUTPATIENT
Start: 2024-12-13 | End: 2024-12-13 | Stop reason: HOSPADM

## 2024-12-13 RX ORDER — ACETAMINOPHEN 650 MG/1
650 SUPPOSITORY RECTAL ONCE
Status: COMPLETED | OUTPATIENT
Start: 2024-12-13 | End: 2024-12-13

## 2024-12-13 RX ORDER — FENTANYL CITRATE 0.05 MG/ML
25 INJECTION, SOLUTION INTRAMUSCULAR; INTRAVENOUS EVERY 5 MIN PRN
Status: DISCONTINUED | OUTPATIENT
Start: 2024-12-13 | End: 2024-12-13 | Stop reason: HOSPADM

## 2024-12-13 RX ORDER — BUPIVACAINE HYDROCHLORIDE 2.5 MG/ML
INJECTION, SOLUTION INFILTRATION; PERINEURAL PRN
Status: DISCONTINUED | OUTPATIENT
Start: 2024-12-13 | End: 2024-12-13 | Stop reason: HOSPADM

## 2024-12-13 RX ORDER — AMOXICILLIN 250 MG
1 CAPSULE ORAL 2 TIMES DAILY
Status: DISCONTINUED | OUTPATIENT
Start: 2024-12-13 | End: 2024-12-14 | Stop reason: HOSPADM

## 2024-12-13 RX ORDER — FENTANYL CITRATE 0.05 MG/ML
50 INJECTION, SOLUTION INTRAMUSCULAR; INTRAVENOUS EVERY 5 MIN PRN
Status: DISCONTINUED | OUTPATIENT
Start: 2024-12-13 | End: 2024-12-13 | Stop reason: HOSPADM

## 2024-12-13 RX ORDER — AMOXICILLIN 250 MG
1 CAPSULE ORAL DAILY
Qty: 30 TABLET | Refills: 0 | Status: SHIPPED | OUTPATIENT
Start: 2024-12-13

## 2024-12-13 RX ORDER — HYDRALAZINE HYDROCHLORIDE 20 MG/ML
2.5-5 INJECTION INTRAMUSCULAR; INTRAVENOUS EVERY 10 MIN PRN
Status: DISCONTINUED | OUTPATIENT
Start: 2024-12-13 | End: 2024-12-13 | Stop reason: HOSPADM

## 2024-12-13 RX ORDER — FAMOTIDINE 20 MG/1
20 TABLET, FILM COATED ORAL 2 TIMES DAILY PRN
Status: DISCONTINUED | OUTPATIENT
Start: 2024-12-13 | End: 2024-12-14 | Stop reason: HOSPADM

## 2024-12-13 RX ORDER — SODIUM CHLORIDE 9 MG/ML
INJECTION, SOLUTION INTRAVENOUS CONTINUOUS
Status: ACTIVE | OUTPATIENT
Start: 2024-12-13 | End: 2024-12-14

## 2024-12-13 RX ORDER — POLYETHYLENE GLYCOL 3350 17 G/17G
17 POWDER, FOR SOLUTION ORAL DAILY
Status: DISCONTINUED | OUTPATIENT
Start: 2024-12-14 | End: 2024-12-14 | Stop reason: HOSPADM

## 2024-12-13 RX ORDER — CEFAZOLIN SODIUM/WATER 2 G/20 ML
2 SYRINGE (ML) INTRAVENOUS SEE ADMIN INSTRUCTIONS
Status: DISCONTINUED | OUTPATIENT
Start: 2024-12-13 | End: 2024-12-13 | Stop reason: HOSPADM

## 2024-12-13 RX ORDER — DEXAMETHASONE SODIUM PHOSPHATE 4 MG/ML
4 INJECTION, SOLUTION INTRA-ARTICULAR; INTRALESIONAL; INTRAMUSCULAR; INTRAVENOUS; SOFT TISSUE
Status: DISCONTINUED | OUTPATIENT
Start: 2024-12-13 | End: 2024-12-13 | Stop reason: HOSPADM

## 2024-12-13 RX ORDER — NALOXONE HYDROCHLORIDE 0.4 MG/ML
0.1 INJECTION, SOLUTION INTRAMUSCULAR; INTRAVENOUS; SUBCUTANEOUS
Status: DISCONTINUED | OUTPATIENT
Start: 2024-12-13 | End: 2024-12-13 | Stop reason: HOSPADM

## 2024-12-13 RX ORDER — PROCHLORPERAZINE MALEATE 10 MG
10 TABLET ORAL EVERY 6 HOURS PRN
Status: DISCONTINUED | OUTPATIENT
Start: 2024-12-13 | End: 2024-12-14 | Stop reason: HOSPADM

## 2024-12-13 RX ORDER — ACETAMINOPHEN 325 MG/1
650 TABLET ORAL EVERY 4 HOURS PRN
Status: DISCONTINUED | OUTPATIENT
Start: 2024-12-16 | End: 2024-12-14 | Stop reason: HOSPADM

## 2024-12-13 RX ORDER — ONDANSETRON 2 MG/ML
4 INJECTION INTRAMUSCULAR; INTRAVENOUS EVERY 6 HOURS PRN
Status: DISCONTINUED | OUTPATIENT
Start: 2024-12-13 | End: 2024-12-14 | Stop reason: HOSPADM

## 2024-12-13 RX ORDER — LABETALOL HYDROCHLORIDE 5 MG/ML
20 INJECTION, SOLUTION INTRAVENOUS EVERY 6 HOURS PRN
Status: DISCONTINUED | OUTPATIENT
Start: 2024-12-13 | End: 2024-12-14 | Stop reason: HOSPADM

## 2024-12-13 RX ORDER — ACETAMINOPHEN 325 MG/1
975 TABLET ORAL EVERY 8 HOURS
Status: DISCONTINUED | OUTPATIENT
Start: 2024-12-13 | End: 2024-12-14 | Stop reason: HOSPADM

## 2024-12-13 RX ORDER — CEFAZOLIN SODIUM/WATER 2 G/20 ML
2 SYRINGE (ML) INTRAVENOUS
Status: DISCONTINUED | OUTPATIENT
Start: 2024-12-13 | End: 2024-12-13 | Stop reason: HOSPADM

## 2024-12-13 RX ADMIN — OXYCODONE HYDROCHLORIDE 5 MG: 5 TABLET ORAL at 20:52

## 2024-12-13 RX ADMIN — ACETAMINOPHEN 975 MG: 325 TABLET, FILM COATED ORAL at 06:32

## 2024-12-13 RX ADMIN — SODIUM CHLORIDE: 9 INJECTION, SOLUTION INTRAVENOUS at 14:53

## 2024-12-13 RX ADMIN — HEPARIN SODIUM 5000 UNITS: 5000 INJECTION, SOLUTION INTRAVENOUS; SUBCUTANEOUS at 06:32

## 2024-12-13 RX ADMIN — OXYCODONE HYDROCHLORIDE 5 MG: 5 TABLET ORAL at 15:38

## 2024-12-13 RX ADMIN — SODIUM CHLORIDE, POTASSIUM CHLORIDE, SODIUM LACTATE AND CALCIUM CHLORIDE: 600; 310; 30; 20 INJECTION, SOLUTION INTRAVENOUS at 06:52

## 2024-12-13 RX ADMIN — SENNOSIDES AND DOCUSATE SODIUM 1 TABLET: 50; 8.6 TABLET ORAL at 20:52

## 2024-12-13 RX ADMIN — FENTANYL CITRATE 50 MCG: 50 INJECTION, SOLUTION INTRAMUSCULAR; INTRAVENOUS at 11:18

## 2024-12-13 RX ADMIN — ACETAMINOPHEN 975 MG: 325 TABLET, FILM COATED ORAL at 14:46

## 2024-12-13 RX ADMIN — FENTANYL CITRATE 50 MCG: 50 INJECTION, SOLUTION INTRAMUSCULAR; INTRAVENOUS at 12:10

## 2024-12-13 RX ADMIN — ACETAMINOPHEN 975 MG: 325 TABLET, FILM COATED ORAL at 22:56

## 2024-12-13 ASSESSMENT — ACTIVITIES OF DAILY LIVING (ADL)
ADLS_ACUITY_SCORE: 18
ADLS_ACUITY_SCORE: 22
ADLS_ACUITY_SCORE: 18
ADLS_ACUITY_SCORE: 24
ADLS_ACUITY_SCORE: 18
ADLS_ACUITY_SCORE: 22
ADLS_ACUITY_SCORE: 18
ADLS_ACUITY_SCORE: 18
ADLS_ACUITY_SCORE: 22
ADLS_ACUITY_SCORE: 18
ADLS_ACUITY_SCORE: 22
ADLS_ACUITY_SCORE: 18
ADLS_ACUITY_SCORE: 41
ADLS_ACUITY_SCORE: 22
ADLS_ACUITY_SCORE: 18
ADLS_ACUITY_SCORE: 18
ADLS_ACUITY_SCORE: 24
ADLS_ACUITY_SCORE: 18

## 2024-12-13 NOTE — OP NOTE
OPERATIVE REPORT  DATE OF PROCEDURE: 12/13/2024  PRE-PROCEDURE DIAGNOSIS: Prostate cancer.   POST-PROCEDURE DIAGNOSIS: Prostate cancer.   PROCEDURES PERFORMED:   1) Robotic-assisted laparoscopic radical prostatectomy  2) Bilateral pelvic lymphadenectomy  SURGEON: Manny Dominique MD - Present for the entire procedure  ASSISTANT: Ziggy Monge MD  SECOND ASSISTANT: Felisha Ibrahim  ANESTHESIA: General with endotracheal intubation.   INDICATIONS FOR PROCEDURE: Fabrizio Sandoval is a 64 year-old gentleman with a history of Jackson 3+4 = 7 adenocarcinoma of the prostate. He has been counseled regarding treatment options and presents to undergo surgery.   Findings: Prostate and seminal vesicles removed without complication. Lymph nodes grossly negative. Water-tight anastomosis.  Specimens:    ID Type Source Tests Collected by Time Destination   1 : PROSTATE AND SEMINAL VESICLES Tissue Prostate SURGICAL PATHOLOGY EXAM Manny Dominique MD 12/13/2024 10:24 AM    2 : BILATERAL PELVIC LYMPH NODES Tissue Lymph Nodes, Pelvis, Regional SURGICAL PATHOLOGY EXAM Manny Dominique MD 12/13/2024 10:24 AM      DESCRIPTION OF PROCEDURE: After fully informed voluntary consent was obtained, the patient was brought into the operating room, properly identified and placed in a supine position on the table. General anesthesia was induced, endotracheal intubation performed, IV Ancef administered. The patient was then positioned appropriately on the table and all pressure points were padded and he was secured to the table with tape. Once the positioning was performed, we proceeded with shaving, prepping and draping the abdomen in the usual sterile fashion. Yeung was placed in the bladder in a sterile manner on the field. A transverse midline incision about 2 cm above the umbilicus was made and a Veress needle introduced into the abdomen through this incision. Once the abdomen was accessed, it was confirmed using a saline  drop test and the abdomen was insufflated. Once the abdomen was insufflated, additional robotic ports, 2 on the left and 1 on the right, were placed. A 12mm right-sided assistant port lateral to the robotic ports. Robot was then docked and surgery was commenced.   The sigmoid was adherent to the lateral pelvic sidewall and these adhesions were taken down sharply. A transverse incision was made posterior to the seminal vesicles. Denonvilliers' fascia was incised the rectum was mobilized way from the posterior prostate. The vas deferens on both sides was identified and divided with electrocautery and the seminal vesicles were carefully dissected out with the vasculature being controlled using Lapro clips. Once seminal vesicle and vas deferens were dissected all the way down to the prostate, attention was turned to bringing the bladder down off the anterior wall of the abdomen. Incisions were made lateral the medial umbilical ligaments and carried all the way up to the umbilicus and the urachus was divided horizontally. The bladder was mobilized away from the anterior abdominal wall and the the pelvic sidewalls on both sides. Endopelvic fascia was opened on each side and lateral prostate was dissected away from the levator musculature. An 0-Vicryl suture was placed to ligate the dorsal venous complex.  The anterior bladder neck was then incised at the base of the prostate and dissection was carried through the anterior urethra to identify the Yeung catheter. The Yeung was then pulled out through this opening in the urethra to give upward traction on the prostate. Posterior bladder neck was then divided. There was no median lobe. Attention was then turned to the prostatic pedicles. A bilateral nerve-sparing procedure was performed by sharply mobilizing the neurovascular bundles away from the posterior-lateral aspect of the prostate. The prostatic pedicles were then divided using clips on both sides all the way to the  apex. The dorsal venous complex was then divided with cautery. The anterior urethra and apex of prostate were dissected free. Urethra was divided and prostate was free. It was placed in an endocatch bag.  Prostatic fossa was carefully inspected for hemostasis. Pelvic lymph node dissection was performed removing all greg tissue between the external iliac vein, the pelvic floor, inguinal ligament up to the bifurcation of the common iliac vessels on both sides. Vesicourethral anastomosis was then performed in a standard fashion using two 3-0 V-lock sutures, the tail ends of which had been tied together. The first suture was taken at the 5 o'clock position through the bladder neck and taken through the posterior urethral plate and up the left side of the urethra and bladder to the 11 o'clock position. We then brought the right sided anastomotic suture through the urethra and up the right side of the urethra and bladder to the midline. The sutures were then tied down. A final Yeung catheter was then placed and the bladder irrigated.  The anastomosis was tested with saline and found it to be leak free. An EndoCatch bag was used to retrieve the specimen and a 19 Manpreet drain was placed in the pelvis. Robot was undocked and ports removed. Midline camera port incision was enlarged in a transverse fashion and the specimen retrieved. The fascia was reapproximated using interrupted figure-of-eight 0 Vicryl sutures on UR-6 needle. The skin of all the incisions was closed using running subcuticular closure with 4-0 Monocryl. Skin glue was placed.      The patient tolerated the procedure well. There were no complications. Blood loss was 100 mL. All sponge, needle and instrument counts were correct and doubly verified prior to closure. I was present and involved in the entire procedure.      Manny Dominique MD  Urology  [facility]

## 2024-12-13 NOTE — PLAN OF CARE
Goal Outcome Evaluation: Progressing    Reason for Admission: BPH    Evaluation of Goal:   Patient is A&Ox 4. Vitals are stable on RA. Patient is up with SBA. Patient reported moderate pain for which tylenol and oxycodone was given. Lap sites CDI and MAITE site is leaking serosanguineous with minimal output. Yeung patent with adequate output. Patient tolerating PO. IVF running. Patient scoring green on the Aggression Stoplight Tool. Pt calm and cooperative with cares, able to make needs known, call light within reach, bed alarm on for safety. Discharge disposition is pending.     Eleonora Zuluaga RN on 12/13/2024 at 6:52 PM

## 2024-12-14 VITALS
HEART RATE: 68 BPM | OXYGEN SATURATION: 92 % | SYSTOLIC BLOOD PRESSURE: 108 MMHG | HEIGHT: 71 IN | WEIGHT: 206 LBS | DIASTOLIC BLOOD PRESSURE: 64 MMHG | RESPIRATION RATE: 15 BRPM | BODY MASS INDEX: 28.84 KG/M2 | TEMPERATURE: 97.9 F

## 2024-12-14 LAB
ANION GAP SERPL CALCULATED.3IONS-SCNC: 7 MMOL/L (ref 7–15)
BUN SERPL-MCNC: 12.3 MG/DL (ref 8–23)
CALCIUM SERPL-MCNC: 9 MG/DL (ref 8.8–10.4)
CHLORIDE SERPL-SCNC: 106 MMOL/L (ref 98–107)
CREAT SERPL-MCNC: 0.86 MG/DL (ref 0.67–1.17)
EGFRCR SERPLBLD CKD-EPI 2021: >90 ML/MIN/1.73M2
ERYTHROCYTE [DISTWIDTH] IN BLOOD BY AUTOMATED COUNT: 12.7 % (ref 10–15)
GLUCOSE SERPL-MCNC: 108 MG/DL (ref 70–99)
HCO3 SERPL-SCNC: 26 MMOL/L (ref 22–29)
HCT VFR BLD AUTO: 32.9 % (ref 40–53)
HGB BLD-MCNC: 11.5 G/DL (ref 13.3–17.7)
MCH RBC QN AUTO: 31.8 PG (ref 26.5–33)
MCHC RBC AUTO-ENTMCNC: 35 G/DL (ref 31.5–36.5)
MCV RBC AUTO: 91 FL (ref 78–100)
PLATELET # BLD AUTO: 211 10E3/UL (ref 150–450)
PLATELET # BLD AUTO: 211 10E3/UL (ref 150–450)
POTASSIUM SERPL-SCNC: 4.4 MMOL/L (ref 3.4–5.3)
RBC # BLD AUTO: 3.62 10E6/UL (ref 4.4–5.9)
SODIUM SERPL-SCNC: 139 MMOL/L (ref 135–145)
WBC # BLD AUTO: 8.2 10E3/UL (ref 4–11)

## 2024-12-14 PROCEDURE — 96372 THER/PROPH/DIAG INJ SC/IM: CPT

## 2024-12-14 PROCEDURE — 250N000013 HC RX MED GY IP 250 OP 250 PS 637

## 2024-12-14 PROCEDURE — 85014 HEMATOCRIT: CPT

## 2024-12-14 PROCEDURE — 250N000011 HC RX IP 250 OP 636

## 2024-12-14 PROCEDURE — 82374 ASSAY BLOOD CARBON DIOXIDE: CPT

## 2024-12-14 PROCEDURE — 36415 COLL VENOUS BLD VENIPUNCTURE: CPT | Performed by: UROLOGY

## 2024-12-14 PROCEDURE — 85041 AUTOMATED RBC COUNT: CPT

## 2024-12-14 PROCEDURE — 80048 BASIC METABOLIC PNL TOTAL CA: CPT

## 2024-12-14 RX ORDER — CIPROFLOXACIN 500 MG/1
500 TABLET, FILM COATED ORAL ONCE
Qty: 1 TABLET | Refills: 0 | Status: SHIPPED | OUTPATIENT
Start: 2024-12-14 | End: 2024-12-14

## 2024-12-14 RX ADMIN — HEPARIN SODIUM 5000 UNITS: 5000 INJECTION, SOLUTION INTRAVENOUS; SUBCUTANEOUS at 13:47

## 2024-12-14 RX ADMIN — OXYCODONE HYDROCHLORIDE 10 MG: 5 TABLET ORAL at 14:45

## 2024-12-14 RX ADMIN — ACETAMINOPHEN 975 MG: 325 TABLET, FILM COATED ORAL at 14:45

## 2024-12-14 RX ADMIN — OXYCODONE HYDROCHLORIDE 10 MG: 5 TABLET ORAL at 10:48

## 2024-12-14 RX ADMIN — HEPARIN SODIUM 5000 UNITS: 5000 INJECTION, SOLUTION INTRAVENOUS; SUBCUTANEOUS at 06:37

## 2024-12-14 RX ADMIN — OXYCODONE HYDROCHLORIDE 5 MG: 5 TABLET ORAL at 06:37

## 2024-12-14 RX ADMIN — ACETAMINOPHEN 975 MG: 325 TABLET, FILM COATED ORAL at 06:37

## 2024-12-14 RX ADMIN — POLYETHYLENE GLYCOL 3350 17 G: 17 POWDER, FOR SOLUTION ORAL at 08:08

## 2024-12-14 RX ADMIN — SENNOSIDES AND DOCUSATE SODIUM 1 TABLET: 50; 8.6 TABLET ORAL at 08:08

## 2024-12-14 ASSESSMENT — ACTIVITIES OF DAILY LIVING (ADL)
ADLS_ACUITY_SCORE: 35
ADLS_ACUITY_SCORE: 24
ADLS_ACUITY_SCORE: 31
ADLS_ACUITY_SCORE: 31
ADLS_ACUITY_SCORE: 35
ADLS_ACUITY_SCORE: 31
ADLS_ACUITY_SCORE: 31
ADLS_ACUITY_SCORE: 35
ADLS_ACUITY_SCORE: 31
ADLS_ACUITY_SCORE: 35
ADLS_ACUITY_SCORE: 35
ADLS_ACUITY_SCORE: 24
ADLS_ACUITY_SCORE: 31
ADLS_ACUITY_SCORE: 35

## 2024-12-14 NOTE — PROGRESS NOTES
"Urology  Progress Note    Feels well  Pain controlled   Passing gas  Eating     Exam  /64 (BP Location: Right arm)   Pulse 68   Temp 97.9  F (36.6  C) (Oral)   Resp 15   Ht 1.803 m (5' 11\")   Wt 93.4 kg (206 lb)   SpO2 92%   BMI 28.73 kg/m    No acute distress  Unlabored breathing on room air  Abdomen soft, appropriately tender, nondistended. Incisions c/d/i  MAITE serosanguinous  Yeung with clear yellow urine in tubing    Assessment/Plan  64 year old y/o male POD#0 s/p RALP    - home today with catheter, MAITE out prior     Discussed with Dr. Trip Monge MD   Urology Resident PGY-5       "

## 2024-12-14 NOTE — PROGRESS NOTES
Patient discharged to home.  IV was discontinued. Pain at time of discharge was 0/10. Belongings returned to patient.  Discharge instructions and medications reviewed with patient and S.O.  Patient verbalized understanding and all questions were answered.  Prescriptions given to patient.  At time of discharge, patient condition was stable and left the unit via WC escorted by NA and S.O ().

## 2024-12-14 NOTE — PLAN OF CARE
Goal Outcome Evaluation:      Plan of Care Reviewed With: patient    Overall Patient Progress: improvingOverall Patient Progress: improving    Surgery: POD #1 Robotic Assisted Laparoscopic Radical Prostatectomy with Pelvic Lymphadenectomy   Behavior & Aggression: Green  Fall Risk: Yes  Orientation: A&Ox4  ABNL VS/O2: VSS on RA- needed 2L NC overnight  ABNL Labs: See results  Pain Management: Scheduled Tylenol, PRN Oxycodone, & ice packs  Bowel/Bladder: Yeung in place- adequate output. BS hypoactive- passing gas/ no stools overnight  IV: PIV SL  Wounds/incisions: Abdominal incisions intact with liquid bandage. MAITE drain with bloody output- dressing changed x1.  Diet: Low fiber- tolerating. Denies N/V  Activity Level: Ax1 gait belt   Anticipated  DC Date: Pending

## 2024-12-17 ENCOUNTER — CARE COORDINATION (OUTPATIENT)
Dept: UROLOGY | Facility: CLINIC | Age: 64
End: 2024-12-17
Payer: COMMERCIAL

## 2024-12-18 NOTE — PROGRESS NOTES
Carolina Dinh,     How are you doing after your surgical procedure on 12/13/24?     Any fever, chills, nausea or vomiting?  How is your appetite?  Are you drinking enough fluids?  Have you had a bowel movement since you have been home?  Any problems with your catheter?  Is your urine clear yellow? If not, what color is it?  How does your incision look? Is there any drainage? What color is the drainage?  Is there any redness, warmth or tenderness around the incision?  How is your pain? On a scale of 0-10, what number would you rate the pain?  What medications are you taking for your pain? Are you able to control the pain?        Do you have any questions or concerns?     We have your post-operative appointment scheduled for 12/20/24 at 1:00pm with one of our nurses at the [facility] to have your catheter removed.     We have your post-operative appointment scheduled for 12/26/24 at 12:45pm with Dr. Dominique at the [facility].     Please feel free to reply via MyChart or at the number below.        Hannah Stack RN, BSN  Care Coordinator  [facility]  [phone]

## 2024-12-18 NOTE — PROGRESS NOTES
Carolina Dinh,     How are you doing after your surgical procedure on 12/13/24?     Any fever, chills, nausea or vomiting?  None  How is your appetite?  Good, eating lighter meals  Are you drinking enough fluids?  Yes  Have you had a bowel movement since you have been home?  Yes  Any problems with your catheter?  None  Is your urine clear yellow? If not, what color is it?  Yellow  How does your incision look? Is there any drainage? What color is the drainage?  Good, no drainage  Is there any redness, warmth or tenderness around the incision?  None  How is your pain? On a scale of 0-10, what number would you rate the pain? 0/10.  Tenderness in abdomen, catheter irritation, suggested bacitracin around catheter at tip of penis TID.  What medications are you taking for your pain? Are you able to control the pain?  No pain meds today        Do you have any questions or concerns?     We have your post-operative appointment scheduled for 12/20/24 at 1:00pm with one of our nurses at the [facility] to have your catheter removed.     We have your post-operative appointment scheduled for 12/26/24 at 12:45pm with Dr. Dominique at the [facility].     Please feel free to reply via MyChart or at the number below.

## 2024-12-19 LAB
PATH REPORT.COMMENTS IMP SPEC: ABNORMAL
PATH REPORT.COMMENTS IMP SPEC: ABNORMAL
PATH REPORT.COMMENTS IMP SPEC: YES
PATH REPORT.FINAL DX SPEC: ABNORMAL
PATH REPORT.GROSS SPEC: ABNORMAL
PATH REPORT.MICROSCOPIC SPEC OTHER STN: ABNORMAL
PATH REPORT.RELEVANT HX SPEC: ABNORMAL
PATHOLOGY SYNOPTIC REPORT: ABNORMAL
PHOTO IMAGE: ABNORMAL

## 2024-12-19 PROCEDURE — 88309 TISSUE EXAM BY PATHOLOGIST: CPT | Mod: 26 | Performed by: PATHOLOGY

## 2024-12-19 PROCEDURE — 88307 TISSUE EXAM BY PATHOLOGIST: CPT | Mod: 26 | Performed by: PATHOLOGY

## 2024-12-26 ENCOUNTER — OFFICE VISIT (OUTPATIENT)
Dept: UROLOGY | Facility: CLINIC | Age: 64
End: 2024-12-26
Payer: COMMERCIAL

## 2024-12-26 VITALS
SYSTOLIC BLOOD PRESSURE: 136 MMHG | DIASTOLIC BLOOD PRESSURE: 88 MMHG | WEIGHT: 206 LBS | HEIGHT: 71 IN | BODY MASS INDEX: 28.84 KG/M2 | HEART RATE: 69 BPM | OXYGEN SATURATION: 99 %

## 2024-12-26 DIAGNOSIS — N52.31 ERECTILE DYSFUNCTION AFTER RADICAL PROSTATECTOMY: ICD-10-CM

## 2024-12-26 DIAGNOSIS — C61 PROSTATE CANCER (H): Primary | ICD-10-CM

## 2024-12-26 RX ORDER — CIPROFLOXACIN 500 MG/1
TABLET, FILM COATED ORAL
COMMUNITY
Start: 2024-12-14

## 2024-12-26 RX ORDER — SILDENAFIL CITRATE 20 MG/1
20 TABLET ORAL DAILY PRN
Qty: 30 TABLET | Refills: 11 | Status: SHIPPED | OUTPATIENT
Start: 2024-12-26

## 2024-12-26 ASSESSMENT — PAIN SCALES - GENERAL: PAINLEVEL_OUTOF10: NO PAIN (0)

## 2024-12-26 NOTE — LETTER
12/26/2024       RE: Forest Sandoval  [address] [address] [address]     Dear Colleague,    Thank you for referring your patient, Forest Sandoval, to the [facility] [place] [facility]. Please see a copy of my visit note below.      CHIEF COMPLAINT   It was my pleasure to see Forest Sandoval who is a 64 year old male for follow-up of Prostate Cancer.      HPI   Forest Sandoval is a very pleasant 64 year old male who presents with a history of Prostate Cancer. RALP 12/13/2024 and has recovered well and without complication.     RALP 12/13/2024  PECIMEN   Procedure  Radical prostatectomy   TUMOR   Histologic Type  Acinar adenocarcinoma, conventional (usual)   Histologic Grade     Grade  Grade group 2 ([place] Score 3 + 4 = 7)   Minor Tertiary Pattern 5 (less than 5%)  Present   Intraductal Carcinoma (IDC)  Not identified   Cribriform Glands  Not identified   Treatment Effect  No known presurgical therapy   TUMOR QUANTITATION     Estimated Percentage of Prostate Involved by Tumor  1 - 5%   Extraprostatic Extension (EPE)  Not identified   Urinary Bladder Neck Invasion  Not identified   Seminal Vesicle Invasion  Not identified   Lymphatic and / or Vascular Invasion  Not Identified   MARGINS   Margin Status  All margins negative for invasive carcinoma   REGIONAL LYMPH NODES   Regional Lymph Node Status  All regional lymph nodes negative for tumor   Number of Lymph Nodes Examined  7   pTNM CLASSIFICATION (AJCC 8th Edition)   Reporting of pT, pN, and (when applicable) pM categories is based on information available to the pathologist at the time the report is issued. As per the AJCC (Chapter 1, 8th Ed.) it is the managing physician s responsibility to establish the final pathologic stage based upon all pertinent information, including but potentially not limited to this pathology report.   pT Category  pT2   pN Category  pN0  "    PHYSICAL EXAM  Patient is a 64 year old  male   Vitals: Blood pressure 136/88, pulse 69, height 1.803 m (5' 11\"), weight 93.4 kg (206 lb), SpO2 99%.  General Appearance Adult: Body mass index is 28.73 kg/m .  Alert, no acute distress, oriented  Lungs: no respiratory distress, or pursed lip breathing  Abdomen: soft, nontender, no organomegaly or masses  Incisions healing well  Back: no CVAT  Neuro: Alert, oriented, speech and mentation normal  Psych: affect and mood normal    ASSESSMENT and PLAN  64 year old male with stage pT2N0, Gerry 3+4=7 prostate cancer, s/p RALP completed 12/13/2024. Negative margins. Recovering well. Yeung removed last week. Discussed expectations for recovery and ongoing activity restrictions. We discussed his pathology in detail today. We discussed the importance of ongoing PSA surveillance and risk of recurrence.     - Sildenafil prescribed today  - Pelvic floor PT referral  - Follow-up 3 months with PSA    10 minutes spent on the date of the encounter doing chart review, history and exam, documentation and further activities as noted above.    Manny Dominique MD  Urology  [facility]      Again, thank you for allowing me to participate in the care of your patient.      Sincerely,    Manny Dominique MD    "

## 2024-12-26 NOTE — NURSING NOTE
Chief Complaint   Patient presents with    hx prostate cancer     Post op visit from 12-    Healing and doing well   Neville Barron, CMA

## 2024-12-26 NOTE — PROGRESS NOTES
"  CHIEF COMPLAINT   It was my pleasure to see Forest Sandoval who is a 64 year old male for follow-up of Prostate Cancer.      HPI   Forest Sandoval is a very pleasant 64 year old male who presents with a history of Prostate Cancer. RALP 12/13/2024 and has recovered well and without complication.     RALP 12/13/2024  PECIMEN   Procedure  Radical prostatectomy   TUMOR   Histologic Type  Acinar adenocarcinoma, conventional (usual)   Histologic Grade     Grade  Grade group 2 ([place] Score 3 + 4 = 7)   Minor Tertiary Pattern 5 (less than 5%)  Present   Intraductal Carcinoma (IDC)  Not identified   Cribriform Glands  Not identified   Treatment Effect  No known presurgical therapy   TUMOR QUANTITATION     Estimated Percentage of Prostate Involved by Tumor  1 - 5%   Extraprostatic Extension (EPE)  Not identified   Urinary Bladder Neck Invasion  Not identified   Seminal Vesicle Invasion  Not identified   Lymphatic and / or Vascular Invasion  Not Identified   MARGINS   Margin Status  All margins negative for invasive carcinoma   REGIONAL LYMPH NODES   Regional Lymph Node Status  All regional lymph nodes negative for tumor   Number of Lymph Nodes Examined  7   pTNM CLASSIFICATION (AJCC 8th Edition)   Reporting of pT, pN, and (when applicable) pM categories is based on information available to the pathologist at the time the report is issued. As per the AJCC (Chapter 1, 8th Ed.) it is the managing physician s responsibility to establish the final pathologic stage based upon all pertinent information, including but potentially not limited to this pathology report.   pT Category  pT2   pN Category  pN0     PHYSICAL EXAM  Patient is a 64 year old  male   Vitals: Blood pressure 136/88, pulse 69, height 1.803 m (5' 11\"), weight 93.4 kg (206 lb), SpO2 99%.  General Appearance Adult: Body mass index is 28.73 kg/m .  Alert, no acute distress, oriented  Lungs: no respiratory distress, or pursed lip breathing  Abdomen: soft, " nontender, no organomegaly or masses  Incisions healing well  Back: no CVAT  Neuro: Alert, oriented, speech and mentation normal  Psych: affect and mood normal    ASSESSMENT and PLAN  64 year old male with stage pT2N0, Gerry 3+4=7 prostate cancer, s/p RALP completed 12/13/2024. Negative margins. Recovering well. Yeung removed last week. Discussed expectations for recovery and ongoing activity restrictions. We discussed his pathology in detail today. We discussed the importance of ongoing PSA surveillance and risk of recurrence.     - Sildenafil prescribed today  - Pelvic floor PT referral  - Follow-up 3 months with PSA    10 minutes spent on the date of the encounter doing chart review, history and exam, documentation and further activities as noted above.    Manny Dominique MD  Urology  [facility]

## 2024-12-29 ENCOUNTER — HEALTH MAINTENANCE LETTER (OUTPATIENT)
Age: 64
End: 2024-12-29

## 2025-01-02 ENCOUNTER — MYC MEDICAL ADVICE (OUTPATIENT)
Dept: UROLOGY | Facility: CLINIC | Age: 65
End: 2025-01-02
Payer: COMMERCIAL

## 2025-01-02 DIAGNOSIS — R22.9 LOCALIZED SUPERFICIAL SWELLING, MASS, OR LUMP: Primary | ICD-10-CM
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
## 2025-01-06 ENCOUNTER — ANCILLARY PROCEDURE (OUTPATIENT)
Dept: ULTRASOUND IMAGING | Facility: CLINIC | Age: 65
End: 2025-01-06
Attending: UROLOGY
Payer: COMMERCIAL

## 2025-01-06 ENCOUNTER — TELEPHONE (OUTPATIENT)
Dept: UROLOGY | Facility: CLINIC | Age: 65
End: 2025-01-06
Payer: COMMERCIAL

## 2025-01-06 DIAGNOSIS — R22.9 LOCALIZED SUPERFICIAL SWELLING, MASS, OR LUMP: ICD-10-CM

## 2025-01-06 PROCEDURE — 93976 VASCULAR STUDY: CPT | Mod: TC | Performed by: RADIOLOGY

## 2025-01-06 PROCEDURE — 76870 US EXAM SCROTUM: CPT | Mod: TC | Performed by: RADIOLOGY

## 2025-01-06 NOTE — TELEPHONE ENCOUNTER
Patient had a radical prostatectomy with lymphadenectomy on 12/13/24. He is stating that he hasn't had any issues, until 4 days ago. Suddenly swelled to softball size. Today grapefruit size. No other symptoms.   Voiding without issues. No drainage. Pain is 1 out of 10. Afebrile.    I spoke to Dr. Dominique. He ordered a scrotal ultrasound.

## 2025-01-09 ENCOUNTER — OFFICE VISIT (OUTPATIENT)
Dept: UROLOGY | Facility: CLINIC | Age: 65
End: 2025-01-09
Payer: COMMERCIAL

## 2025-01-09 VITALS
HEIGHT: 71 IN | OXYGEN SATURATION: 98 % | WEIGHT: 206 LBS | SYSTOLIC BLOOD PRESSURE: 123 MMHG | HEART RATE: 61 BPM | DIASTOLIC BLOOD PRESSURE: 85 MMHG | BODY MASS INDEX: 28.84 KG/M2

## 2025-01-09 DIAGNOSIS — C61 PROSTATE CANCER (H): Primary | ICD-10-CM

## 2025-01-09 DIAGNOSIS — R22.9 LOCALIZED SUPERFICIAL SWELLING, MASS, OR LUMP: ICD-10-CM

## 2025-01-09 ASSESSMENT — PAIN SCALES - GENERAL: PAINLEVEL_OUTOF10: NO PAIN (0)

## 2025-01-09 NOTE — PROGRESS NOTES
CHIEF COMPLAINT   It was my pleasure to see Forest Sandoval who is a 64 year old male for follow-up of Prostate Cancer.      HPI  Forest Sandoval is a very pleasant 64 year old male who presents with a history of Prostate Cancer. RALP 12/13/2024 and has recovered well. Starting about 2 weeks after surgery, he started noting increased scrotal edema. This has been gradually worsening over the past week. Ultrasound notes edema with small hydroceles, but no acute pathology. He notes some right groin discomfort, particularly later in the day.  In terms of urination, noting relatively weak stream, but otherwise recovering as expected.    US Scrotum 1/6/2025  IMPRESSION:  1.  Severe scrotal edema with small bilateral hydroceles.  2.  Normal sonographic appearance of the testicles themselves.  3.  Questionable fat-containing right inguinal hernia.     RALP 12/13/2024       PECIMEN   Procedure   Radical prostatectomy   TUMOR   Histologic Type   Acinar adenocarcinoma, conventional (usual)   Histologic Grade       Grade   Grade group 2 ([place] Score 3 + 4 = 7)   Minor Tertiary Pattern 5 (less than 5%)   Present   Intraductal Carcinoma (IDC)   Not identified   Cribriform Glands   Not identified   Treatment Effect   No known presurgical therapy   TUMOR QUANTITATION       Estimated Percentage of Prostate Involved by Tumor   1 - 5%   Extraprostatic Extension (EPE)   Not identified   Urinary Bladder Neck Invasion   Not identified   Seminal Vesicle Invasion   Not identified   Lymphatic and / or Vascular Invasion   Not Identified   MARGINS   Margin Status   All margins negative for invasive carcinoma   REGIONAL LYMPH NODES   Regional Lymph Node Status   All regional lymph nodes negative for tumor   Number of Lymph Nodes Examined   7   pTNM CLASSIFICATION (AJCC 8th Edition)   Reporting of pT, pN, and (when applicable) pM categories is based on information available to the pathologist at the time the report is issued. As per the  "AJCC (Chapter 1, 8th Ed.) it is the managing physician s responsibility to establish the final pathologic stage based upon all pertinent information, including but potentially not limited to this pathology report.   pT Category   pT2   pN Category   pN0     PHYSICAL EXAM  Patient is a 64 year old  male   Vitals: Blood pressure 123/85, pulse 61, height 1.803 m (5' 11\"), weight 93.4 kg (206 lb), SpO2 98%.  General Appearance Adult: Body mass index is 28.73 kg/m .  Alert, no acute distress, oriented  Lungs: no respiratory distress, or pursed lip breathing  Abdomen: soft, nontender, no organomegaly or masses  Back: no CVAT  Neuro: Alert, oriented, speech and mentation normal  Psych: affect and mood normal  : Scrotal edema noted bilaterally with some penile edema as well. Meatus patent. No palpable hernia. Right groin mildly tender to palpation.     ASSESSMENT and PLAN  64 year old male with stage pT2N0, Gerry 3+4=7 prostate cancer, s/p RALP completed 12/13/2024. Negative margins.   We discussed his scrotal edema and that this could be simply post-op changes and advised scrotal elevation. Given delayed onset of symptoms and some groin/pelvic pain, we also discussed the possibility of a lymphocele and will plan CT to assess for this.     - CT abd/pelvis - will call with results    15 minutes spent on the date of the encounter doing chart review, history and exam, documentation and further activities as noted above.    Manny Dominique MD  Urology  [facility]    "

## 2025-01-09 NOTE — Clinical Note
1/9/2025       RE: Forest Sandoval  [address] [facility] [address]     Dear Colleague,    Thank you for referring your patient, Forest Sandoval, to the [facility] UROLOGY CLINIC [place] at [facility]. Please see a copy of my visit note below.      CHIEF COMPLAINT   It was my pleasure to see Forest Sandoval who is a 64 year old male for follow-up of Prostate Cancer.      HPI  Forest Sandoval is a very pleasant 64 year old male who presents with a history of Prostate Cancer. RALP 12/13/2024 and has recovered well and without complication.     US Scrotum 1/6/2025  IMPRESSION:  1.  Severe scrotal edema with small bilateral hydroceles.  2.  Normal sonographic appearance of the testicles themselves.  3.  Questionable fat-containing right inguinal hernia.     RALP 12/13/2024       PECIMEN   Procedure   Radical prostatectomy   TUMOR   Histologic Type   Acinar adenocarcinoma, conventional (usual)   Histologic Grade       Grade   Grade group 2 (Toone Score 3 + 4 = 7)   Minor Tertiary Pattern 5 (less than 5%)   Present   Intraductal Carcinoma (IDC)   Not identified   Cribriform Glands   Not identified   Treatment Effect   No known presurgical therapy   TUMOR QUANTITATION       Estimated Percentage of Prostate Involved by Tumor   1 - 5%   Extraprostatic Extension (EPE)   Not identified   Urinary Bladder Neck Invasion   Not identified   Seminal Vesicle Invasion   Not identified   Lymphatic and / or Vascular Invasion   Not Identified   MARGINS   Margin Status   All margins negative for invasive carcinoma   REGIONAL LYMPH NODES   Regional Lymph Node Status   All regional lymph nodes negative for tumor   Number of Lymph Nodes Examined   7   pTNM CLASSIFICATION (AJCC 8th Edition)   Reporting of pT, pN, and (when applicable) pM categories is based on information available to the pathologist at the time the report is issued. As per the AJCC (Chapter 1, 8th Ed.)  "it is the managing physician s responsibility to establish the final pathologic stage based upon all pertinent information, including but potentially not limited to this pathology report.   pT Category   pT2   pN Category   pN0       PHYSICAL EXAM  Patient is a 64 year old  male   Vitals: Blood pressure 123/85, pulse 61, height 1.803 m (5' 11\"), weight 93.4 kg (206 lb), SpO2 98%.  General Appearance Adult: Body mass index is 28.73 kg/m .  Alert, no acute distress, oriented  Lungs: no respiratory distress, or pursed lip breathing  Abdomen: soft, nontender, no organomegaly or masses; ***  Back: *** CVAT  Neuro: Alert, oriented, speech and mentation normal  Psych: affect and mood normal  : {MG EXAM MALE GENITAL:706945}    Outside and Past Medical records:  Assessment requiring an independent historian(s) - {:214700}  Review of prior external note(s) from - {note reviewed source:875276}  Review of the result(s) of each unique test - ***    ASSESSMENT and PLAN  64 year old male with stage pT2N0, Gerry 3+4=7 prostate cancer, s/p RALP completed 12/13/2024. Negative margins. Recovering well. Yeung removed last week. Discussed expectations for recovery and ongoing activity restrictions. We discussed his pathology in detail today. We discussed the importance of ongoing PSA surveillance and risk of recurrence.     - Sildenafil prescribed today  - Pelvic floor PT referral  - Follow-up 3 months with PSA      *** minutes spent on the date of the encounter doing chart review, history and exam, documentation and further activities as noted above.    Manny Dominique MD  Urology  [facility]        Again, thank you for allowing me to participate in the care of your patient.      Sincerely,    Manny Dominique MD    " PAST SURGICAL HISTORY:  H/O arthroscopy of knee     H/O hernia repair on 17    S/P      S/P cataract surgery, right

## 2025-01-09 NOTE — NURSING NOTE
Chief Complaint   Patient presents with    hx prostate cancer     01- ultrasound of the testicular    Talk about the results swollen and tender groin  Neville Barron, CMA

## 2025-01-17 ENCOUNTER — DOCUMENTATION ONLY (OUTPATIENT)
Dept: FAMILY MEDICINE | Facility: CLINIC | Age: 65
End: 2025-01-17
Payer: COMMERCIAL

## 2025-01-17 DIAGNOSIS — Z12.5 SCREENING FOR PROSTATE CANCER: ICD-10-CM

## 2025-01-17 DIAGNOSIS — Z13.1 SCREENING FOR DIABETES MELLITUS: Primary | ICD-10-CM

## 2025-01-17 DIAGNOSIS — Z13.220 SCREENING CHOLESTEROL LEVEL: ICD-10-CM

## 2025-01-17 NOTE — PROGRESS NOTES
Patient has upcoming lab only appointment for Fasting lipid on 1/20/25 at 1015, please review and place future orders that may needed. If the lab is not needed, please let your care team follow up with patient.        Thank you  Chapito wood

## 2025-03-17 ENCOUNTER — LAB (OUTPATIENT)
Dept: LAB | Facility: CLINIC | Age: 65
End: 2025-03-17
Payer: COMMERCIAL

## 2025-03-17 DIAGNOSIS — C61 PROSTATE CANCER (H): ICD-10-CM

## 2025-03-17 LAB — PSA SERPL DL<=0.01 NG/ML-MCNC: <0.01 NG/ML (ref 0–4.5)

## 2025-03-17 PROCEDURE — 84153 ASSAY OF PSA TOTAL: CPT

## 2025-03-17 PROCEDURE — 36415 COLL VENOUS BLD VENIPUNCTURE: CPT

## 2025-03-20 ENCOUNTER — VIRTUAL VISIT (OUTPATIENT)
Dept: UROLOGY | Facility: CLINIC | Age: 65
End: 2025-03-20
Payer: COMMERCIAL

## 2025-03-20 DIAGNOSIS — C61 PROSTATE CANCER (H): Primary | ICD-10-CM

## 2025-03-20 NOTE — PROGRESS NOTES
Forest Sandoval is a 64 year old male who is being evaluated via a billable video visit.      How would you like to obtain your AVS? MyChart  If the video visit is dropped, the invitation should be resent by: Text to cell phone: [phone]  Will anyone else be joining your video visit? No    Video Start Time: 4:24 PM    CHIEF COMPLAINT   It was my pleasure to see Forest Sandoval who is a 64 year old male for follow-up of Prostate Cancer.      HPI  Forest Sandoval is a very pleasant 64 year old male who presents with a history of Prostate Cancer. RALP 12/13/2024 and has recovered well. Had some post-op edema that resolved spontaneously. Doing very well with urinary control. Still using pads, but getting much better. No significant erectile function return to date. Has not tried sildenafil.      PSA  3/17/2025 - <0.01     RALP 12/13/2024          PECIMEN   Procedure   Radical prostatectomy   TUMOR   Histologic Type   Acinar adenocarcinoma, conventional (usual)   Histologic Grade       Grade   Grade group 2 (Gerry Score 3 + 4 = 7)   Minor Tertiary Pattern 5 (less than 5%)   Present   Intraductal Carcinoma (IDC)   Not identified   Cribriform Glands   Not identified   Treatment Effect   No known presurgical therapy   TUMOR QUANTITATION       Estimated Percentage of Prostate Involved by Tumor   1 - 5%   Extraprostatic Extension (EPE)   Not identified   Urinary Bladder Neck Invasion   Not identified   Seminal Vesicle Invasion   Not identified   Lymphatic and / or Vascular Invasion   Not Identified   MARGINS   Margin Status   All margins negative for invasive carcinoma   REGIONAL LYMPH NODES   Regional Lymph Node Status   All regional lymph nodes negative for tumor   Number of Lymph Nodes Examined   7   pTNM CLASSIFICATION (AJCC 8th Edition)   Reporting of pT, pN, and (when applicable) pM categories is based on information available to the pathologist at the time the report is issued. As per the AJCC (Chapter 1,  8th Ed.) it is the managing physician s responsibility to establish the final pathologic stage based upon all pertinent information, including but potentially not limited to this pathology report.   pT Category   pT2   pN Category   pN0          Allergies:   Sulfa antibiotics and Tobramycin         Review of Systems:  From intake questionnaire     Skin: negative  Eyes: negative  Ears/Nose/Throat: negative  Respiratory: No shortness of breath, dyspnea on exertion, cough, or hemoptysis  Cardiovascular: No chest pain or palpitations  Gastrointestinal: negative; no nausea/vomiting, constipation or diarrhea  Genitourinary: as per HPI  Musculoskeletal: negative  Neurologic: negative  Psychiatric: negative  Hematologic/Lymphatic/Immunologic: negative  Endocrine: negative         Physical Exam:     Vitals:  No vitals were obtained today due to virtual visit.    Physical Exam   EYES: Eyes grossly normal to inspection.  No discharge or erythema, or obvious scleral/conjunctival abnormalities.  SKIN: Visible skin clear. No significant rash, abnormal pigmentation or lesions.  NEURO: Cranial nerves grossly intact.  Mentation and speech appropriate for age.  GENERAL: Healthy, alert and no distress  RESP: No audible wheeze, cough, or visible cyanosis.  No visible retractions or increased work of breathing.    PSYCH: Mentation appears normal, affect normal/bright, judgement and insight intact, normal speech and appearance well-groomed.      Outside and Past Medical records:    Review of the result(s) of each unique test - PSA         Assessment and Plan:     64 year old male with stage pT2N0, Gerry 3+4=7 prostate cancer, s/p RALP completed 12/13/2024. Negative margins. PSA undetectable. Continence improving well. No significant erectile function to date, but has not yet tried sildenafil.     - Trial of sildenafil  - Follow-up 3 months with PSA    Orders  Orders Placed This Encounter   Procedures    PSA tumor marker [[id number]]      Video-Visit Details    Type of service:  Video Visit    Video End Time:4:34 PM    Originating Location (pt. Location): Home    Distant Location (provider location):  On-site    Platform used for Video Visit: [facility]    11 minutes spent on the date of the encounter including direct interaction with the patient, performing chart review, history and exam, documentation and further activities as noted above.    Manny Dominique MD  Urology  [facility]

## 2025-03-20 NOTE — NURSING NOTE
Current patient location:  [place]    Is the patient currently in the state of [place]? YES    Visit mode: VIDEO    If the visit is dropped, the patient can be reconnected by:VIDEO VISIT: Text to cell phone:   Telephone Information:   Mobile [phone]       Will anyone else be joining the visit? NO  (If patient encounters technical issues they should call [phone])    Are changes needed to the allergy or medication list? No    Are refills needed on medications prescribed by this physician? NO    Rooming Documentation:  Questionnaire(s) completed    Reason for visit: RECHECK    Esther ALEXANDER

## 2025-03-20 NOTE — Clinical Note
3/20/2025       RE: Forest Sandoval  [address] [facility] [address]     Dear Colleague,    Thank you for referring your patient, Forest Sandoval, to the [facility]OLOGY [facility] [place] at [facility]. Please see a copy of my visit note below.    Forest Sandoval is a 64 year old male who is being evaluated via a billable video visit.      How would you like to obtain your AVS? MyChart  If the video visit is dropped, the invitation should be resent by: Text to cell phone: [phone]  Will anyone else be joining your video visit? No    Video Start Time: 4:24 PM    CHIEF COMPLAINT   It was my pleasure to see Forest Sandoval who is a 64 year old male for follow-up of Prostate Cancer.      HPI  Forest Sandoval is a very pleasant 64 year old male who presents with a history of Prostate Cancer. RALP 12/13/2024 and has recovered well. Had some post-op edema that resolved spontaneously. Doing very well with urinary control. Still using pads, but getting much better.     PSA  3/17/2025 - <0.01     RALP 12/13/2024          PECIMEN   Procedure   Radical prostatectomy   TUMOR   Histologic Type   Acinar adenocarcinoma, conventional (usual)   Histologic Grade       Grade   Grade group 2 (Beaumont Score 3 + 4 = 7)   Minor Tertiary Pattern 5 (less than 5%)   Present   Intraductal Carcinoma (IDC)   Not identified   Cribriform Glands   Not identified   Treatment Effect   No known presurgical therapy   TUMOR QUANTITATION       Estimated Percentage of Prostate Involved by Tumor   1 - 5%   Extraprostatic Extension (EPE)   Not identified   Urinary Bladder Neck Invasion   Not identified   Seminal Vesicle Invasion   Not identified   Lymphatic and / or Vascular Invasion   Not Identified   MARGINS   Margin Status   All margins negative for invasive carcinoma   REGIONAL LYMPH NODES   Regional Lymph Node Status   All regional lymph nodes negative for tumor   Number of  Lymph Nodes Examined   7   pTNM CLASSIFICATION (AJCC 8th Edition)   Reporting of pT, pN, and (when applicable) pM categories is based on information available to the pathologist at the time the report is issued. As per the AJCC (Chapter 1, 8th Ed.) it is the managing physician s responsibility to establish the final pathologic stage based upon all pertinent information, including but potentially not limited to this pathology report.   pT Category   pT2   pN Category   pN0          Allergies:   Sulfa antibiotics and Tobramycin         Review of Systems:  From intake questionnaire     Skin: negative  Eyes: negative  Ears/Nose/Throat: negative  Respiratory: No shortness of breath, dyspnea on exertion, cough, or hemoptysis  Cardiovascular: No chest pain or palpitations  Gastrointestinal: negative; no nausea/vomiting, constipation or diarrhea  Genitourinary: as per HPI  Musculoskeletal: negative  Neurologic: negative  Psychiatric: negative  Hematologic/Lymphatic/Immunologic: negative  Endocrine: negative         Physical Exam:     Vitals:  No vitals were obtained today due to virtual visit.    Physical Exam   EYES: Eyes grossly normal to inspection.  No discharge or erythema, or obvious scleral/conjunctival abnormalities.  SKIN: Visible skin clear. No significant rash, abnormal pigmentation or lesions.  NEURO: Cranial nerves grossly intact.  Mentation and speech appropriate for age.  GENERAL: Healthy, alert and no distress  RESP: No audible wheeze, cough, or visible cyanosis.  No visible retractions or increased work of breathing.    PSYCH: Mentation appears normal, affect normal/bright, judgement and insight intact, normal speech and appearance well-groomed.      Outside and Past Medical records:    Review of the result(s) of each unique test - PSA         Assessment and Plan:     64 year old male with stage pT2N0, Rhodell 3+4=7 prostate cancer, s/p RALP completed 12/13/2024. Negative margins.      - Follow-up 3 months  with PSA    Orders  Orders Placed This Encounter   Procedures    PSA tumor marker [[id number]]     Video-Visit Details    Type of service:  Video Visit    Video End Time:4:34 PM    Originating Location (pt. Location): Home    Distant Location (provider location):  On-site    Platform used for Video Visit: [employer]    *** minutes spent on the date of the encounter including direct interaction with the patient, performing chart review, history and exam, documentation and further activities as noted above.    Manny Dominique MD  Urology  [facility]        Again, thank you for allowing me to participate in the care of your patient.      Sincerely,    Manny Dominique MD

## 2025-06-16 ENCOUNTER — LAB (OUTPATIENT)
Dept: LAB | Facility: CLINIC | Age: 65
End: 2025-06-16
Payer: COMMERCIAL

## 2025-06-16 DIAGNOSIS — C61 PROSTATE CANCER (H): ICD-10-CM

## 2025-06-16 LAB — PSA SERPL DL<=0.01 NG/ML-MCNC: <0.01 NG/ML (ref 0–4.5)

## 2025-06-16 PROCEDURE — 84153 ASSAY OF PSA TOTAL: CPT

## 2025-06-16 PROCEDURE — 36415 COLL VENOUS BLD VENIPUNCTURE: CPT

## 2025-06-19 ENCOUNTER — VIRTUAL VISIT (OUTPATIENT)
Dept: UROLOGY | Facility: CLINIC | Age: 65
End: 2025-06-19
Payer: COMMERCIAL

## 2025-06-19 DIAGNOSIS — C61 PROSTATE CANCER (H): Primary | ICD-10-CM

## 2025-06-19 NOTE — PROGRESS NOTES
Forest Sandoval is a 64 year old male who is being evaluated via a billable video visit.      How would you like to obtain your AVS? MyChart  If the video visit is dropped, the invitation should be resent by: Text to cell phone: [phone]  Will anyone else be joining your video visit? No    Video Start Time: 4:44 PM    CHIEF COMPLAINT   It was my pleasure to see Forest Sandoval who is a 64 year old male for follow-up of Prostate Cancer.      HPI  Forest Sandoval is a very pleasant 64 year old male who presents with a history of Prostate Cancer. RALP 12/13/2024 and has recovered well. Had some post-op edema that resolved spontaneously. Doing very well with urinary control. No longer using pads. No significant erectile function return to date. Has not tried sildenafil.      PSA  6/16/2025 - <0.01  3/17/2025 - <0.01     RALP 12/13/2024          PECIMEN   Procedure   Radical prostatectomy   TUMOR   Histologic Type   Acinar adenocarcinoma, conventional (usual)   Histologic Grade       Grade   Grade group 2 (Abingdon Score 3 + 4 = 7)   Minor Tertiary Pattern 5 (less than 5%)   Present   Intraductal Carcinoma (IDC)   Not identified   Cribriform Glands   Not identified   Treatment Effect   No known presurgical therapy   TUMOR QUANTITATION       Estimated Percentage of Prostate Involved by Tumor   1 - 5%   Extraprostatic Extension (EPE)   Not identified   Urinary Bladder Neck Invasion   Not identified   Seminal Vesicle Invasion   Not identified   Lymphatic and / or Vascular Invasion   Not Identified   MARGINS   Margin Status   All margins negative for invasive carcinoma   REGIONAL LYMPH NODES   Regional Lymph Node Status   All regional lymph nodes negative for tumor   Number of Lymph Nodes Examined   7   pTNM CLASSIFICATION (AJCC 8th Edition)   Reporting of pT, pN, and (when applicable) pM categories is based on information available to the pathologist at the time the report is issued. As per the AJCC (Chapter 1, 8th  Ed.) it is the managing physician s responsibility to establish the final pathologic stage based upon all pertinent information, including but potentially not limited to this pathology report.   pT Category   pT2   pN Category   pN0          Allergies:   Sulfa antibiotics and Tobramycin         Review of Systems:  From intake questionnaire     Skin: negative  Eyes: negative  Ears/Nose/Throat: negative  Respiratory: No shortness of breath, dyspnea on exertion, cough, or hemoptysis  Cardiovascular: No chest pain or palpitations  Gastrointestinal: negative; no nausea/vomiting, constipation or diarrhea  Genitourinary: as per HPI  Musculoskeletal: negative  Neurologic: negative  Psychiatric: negative  Hematologic/Lymphatic/Immunologic: negative  Endocrine: negative         Physical Exam:     Vitals:  No vitals were obtained today due to virtual visit.    Physical Exam   EYES: Eyes grossly normal to inspection.  No discharge or erythema, or obvious scleral/conjunctival abnormalities.  SKIN: Visible skin clear. No significant rash, abnormal pigmentation or lesions.  NEURO: Cranial nerves grossly intact.  Mentation and speech appropriate for age.  GENERAL: Healthy, alert and no distress  RESP: No audible wheeze, cough, or visible cyanosis.  No visible retractions or increased work of breathing.    PSYCH: Mentation appears normal, affect normal/bright, judgement and insight intact, normal speech and appearance well-groomed.      Outside and Past Medical records:    Review of the result(s) of each unique test - PSA         Assessment and Plan:     64 year old male with stage pT2N0, Gerry 3+4=7 prostate cancer, s/p RALP completed 12/13/2024. Negative margins. PSA undetectable. Continence improving well. No significant erectile function to date, but has not yet tried sildenafil.     - Follow-up 3 months with PSA    Orders  Orders Placed This Encounter   Procedures    PSA tumor marker     Video-Visit Details    Type of  service:  Video Visit    Video End Time:5:00 PM    Originating Location (pt. Location): Home    Distant Location (provider location):  On-site    Platform used for Video Visit: [employer]    14 minutes spent on the date of the encounter including direct interaction with the patient, performing chart review, history and exam, documentation and further activities as noted above.    Manny Dominique MD  Urology  [facility]

## 2025-06-19 NOTE — Clinical Note
6/19/2025       RE: Forest Sandoval  [address] [facility] [address]     Dear Colleague,    Thank you for referring your patient, Forest Sandoval, to the [facility]OLOGY [facility] [place] at [facility]. Please see a copy of my visit note below.    Forest Sandoval is a 64 year old male who is being evaluated via a billable video visit.      How would you like to obtain your AVS? MyChart  If the video visit is dropped, the invitation should be resent by: Text to cell phone: [phone]  Will anyone else be joining your video visit? No    Video Start Time: 4:44 PM    CHIEF COMPLAINT   It was my pleasure to see Forest Sandoval who is a 64 year old male for follow-up of Prostate Cancer.      HPI  Forest Sandoval is a very pleasant 64 year old male who presents with a history of Prostate Cancer. RALP 12/13/2024 and has recovered well. Had some post-op edema that resolved spontaneously. Doing very well with urinary control. No longer using pads. No significant erectile function return to date. Has not tried sildenafil.      PSA  6/16/2025 - <0.01  3/17/2025 - <0.01     RALP 12/13/2024          PECIMEN   Procedure   Radical prostatectomy   TUMOR   Histologic Type   Acinar adenocarcinoma, conventional (usual)   Histologic Grade       Grade   Grade group 2 (Gerry Score 3 + 4 = 7)   Minor Tertiary Pattern 5 (less than 5%)   Present   Intraductal Carcinoma (IDC)   Not identified   Cribriform Glands   Not identified   Treatment Effect   No known presurgical therapy   TUMOR QUANTITATION       Estimated Percentage of Prostate Involved by Tumor   1 - 5%   Extraprostatic Extension (EPE)   Not identified   Urinary Bladder Neck Invasion   Not identified   Seminal Vesicle Invasion   Not identified   Lymphatic and / or Vascular Invasion   Not Identified   MARGINS   Margin Status   All margins negative for invasive carcinoma   REGIONAL LYMPH NODES   Regional  "Lymph Node Status   All regional lymph nodes negative for tumor   Number of Lymph Nodes Examined   7   pTNM CLASSIFICATION (AJCC 8th Edition)   Reporting of pT, pN, and (when applicable) pM categories is based on information available to the pathologist at the time the report is issued. As per the AJCC (Chapter 1, 8th Ed.) it is the managing physician s responsibility to establish the final pathologic stage based upon all pertinent information, including but potentially not limited to this pathology report.   pT Category   pT2   pN Category   pN0          Allergies:   Sulfa antibiotics and Tobramycin         Review of Systems:  From intake questionnaire     Skin: negative  Eyes: negative  Ears/Nose/Throat: negative  Respiratory: No shortness of breath, dyspnea on exertion, cough, or hemoptysis  Cardiovascular: No chest pain or palpitations  Gastrointestinal: negative; no nausea/vomiting, constipation or diarrhea  Genitourinary: as per HPI  Musculoskeletal: negative  Neurologic: negative  Psychiatric: negative  Hematologic/Lymphatic/Immunologic: negative  Endocrine: negative         Physical Exam:     Vitals:  No vitals were obtained today due to virtual visit.    Physical Exam   {video visit exam brief selected:[id number]::\"GENERAL: Healthy, alert and no distress\",\"EYES: Eyes grossly normal to inspection.  No discharge or erythema, or obvious scleral/conjunctival abnormalities.\",\"RESP: No audible wheeze, cough, or visible cyanosis.  No visible retractions or increased work of breathing.  \",\"SKIN: Visible skin clear. No significant rash, abnormal pigmentation or lesions.\",\"NEURO: Cranial nerves grossly intact.  Mentation and speech appropriate for age.\",\"PSYCH: Mentation appears normal, affect normal/bright, judgement and insight intact, normal speech and appearance well-groomed.\"}      Outside and Past Medical records:    Review of the result(s) of each unique test - PSA         Assessment and Plan:     64 year old male " with stage pT2N0, Gerry 3+4=7 prostate cancer, s/p RALP completed 12/13/2024. Negative margins. PSA undetectable. Continence improving well. No significant erectile function to date, but has not yet tried sildenafil.     - Trial of sildenafil  - Follow-up 3 months with PSA    Orders  Orders Placed This Encounter   Procedures    PSA tumor marker     Video-Visit Details    Type of service:  Video Visit    Video End Time:5:00 PM    Originating Location (pt. Location): Home    Distant Location (provider location):  On-site    Platform used for Video Visit: [employer]    *** minutes spent on the date of the encounter including direct interaction with the patient, performing chart review, history and exam, documentation and further activities as noted above.    Manny Dominique MD  Urology  [facility]        Again, thank you for allowing me to participate in the care of your patient.      Sincerely,    Manny Dominique MD

## 2025-06-19 NOTE — NURSING NOTE
Current patient location: [address] [place] [id number]    Is the patient currently in the state of [place]? YES    Visit mode: VIDEO    If the visit is dropped, the patient can be reconnected by:VIDEO VISIT: Text to cell phone:   Telephone Information:   Mobile [phone]       Will anyone else be joining the visit? NO  (If patient encounters technical issues they should call [phone]    Are changes needed to the allergy or medication list? No, Pt stated no changes to allergies, and Pt stated no med changes    Are refills needed on medications prescribed by this physician? NO    Rooming Documentation:  Not applicable    Reason for visit: RECHDARIO ALEXANDER

## (undated) DEVICE — SU VICRYL 4-0 RB-1 27" J304

## (undated) DEVICE — DAVINCI XI DRAPE COLUMN 470341

## (undated) DEVICE — DAVINCI XI MONOPOLAR SCISSORS HOT SHEARS 8MM 470179

## (undated) DEVICE — SU MONOCRYL 4-0 PS-2 27" UND Y426H

## (undated) DEVICE — TAPE MEDIPORE 4"X2YD 2864

## (undated) DEVICE — SU VICRYL 0 UR-6 27" J603H

## (undated) DEVICE — ENDO POUCH UNIV RETRIEVAL SYSTEM INZII 10MM CD001

## (undated) DEVICE — ESU GROUND PAD ADULT W/CORD E7507

## (undated) DEVICE — DRSG GAUZE 4X4" 3033

## (undated) DEVICE — SPONGE RAY-TEC 4X8" 7318

## (undated) DEVICE — DAVINCI XI GRASPER ENDOWRIST PROGRASP 470093

## (undated) DEVICE — SU PDS II 2-0 SH 27" Z317H

## (undated) DEVICE — RULER SURGICAL PLASTIC STRL LF CS628

## (undated) DEVICE — SU PDS II 0 CT-2 27" Z334H

## (undated) DEVICE — DAVINCI XI SEAL UNIVERSAL 5-12MM 470500

## (undated) DEVICE — CLEANER INST PRE-KLENZ SOAK SHIELD TUBE 6 ML MEDIUM 2D66J4

## (undated) DEVICE — TUBING CONMED AIRSEAL SMOKE EVAC INSUFFLATION ASM-EVAC

## (undated) DEVICE — GLOVE BIOGEL PI ULTRATOUCH G SZ 7.5 42175

## (undated) DEVICE — KIT PATIENT POSITIONING PIGAZZI LATEX FREE 40580

## (undated) DEVICE — ANTIFOG SOLUTION SEE SHARP 150M TROCAR SWABS 30978 (COI)

## (undated) DEVICE — DAVINCI XI NDL DRIVER LARGE 470006

## (undated) DEVICE — SU MONOCRYL 4-0 PS-2 18" UND Y496G

## (undated) DEVICE — LINEN TOWEL PACK X5 5464

## (undated) DEVICE — DRAPE LAP W/ARMBOARD 29410

## (undated) DEVICE — SUCTION IRR STRYKERFLOW II W/TIP 250-070-520

## (undated) DEVICE — SOL WATER IRRIG 1000ML BOTTLE 2F7114

## (undated) DEVICE — PACK DAVINCI UROLOGY SBA15UDFSG

## (undated) DEVICE — GOWN XXL 9575

## (undated) DEVICE — VIAL DECANTER STERILE WHITE DYNJDEC06

## (undated) DEVICE — GLOVE SENSICARE PI ORTHO PF 6.5 LATEX FREE MSG9465

## (undated) DEVICE — SOL NACL 0.9% IRRIG 500ML BOTTLE 2F7123

## (undated) DEVICE — BLADE KNIFE SURG 10 371110

## (undated) DEVICE — LINEN ORTHO PACK 5446

## (undated) DEVICE — DRAIN JACKSON PRATT CHANNEL 19FR ROUND HUBLESS SIL JP-2230

## (undated) DEVICE — SU WND CLOSURE V-LOC 3-0 CV-23 6" VLOCM1904

## (undated) DEVICE — DAVINCI XI DRAPE ARM 470015

## (undated) DEVICE — SU SILK 2-0 FSL 18" 677G

## (undated) DEVICE — DAVINCI XI ESU FCP BIPOLAR MARYLAND 470172

## (undated) DEVICE — PACK MINOR CUSTOM ASC

## (undated) DEVICE — CATH FOLEY 18FR 5ML LATEX 0165SI18

## (undated) DEVICE — SYR 10ML FINGER CONTROL W/O NDL 309695

## (undated) DEVICE — SOL NACL 0.9% IRRIG 1000ML BOTTLE 2F7124

## (undated) DEVICE — ESU GROUND PAD UNIVERSAL W/O CORD

## (undated) DEVICE — PREP CHLORAPREP 26ML TINTED HI-LITE ORANGE 930815

## (undated) DEVICE — DRAIN JACKSON PRATT RESERVOIR 100ML SU130-1305

## (undated) DEVICE — DAVINCI HOT SHEARS TIP COVER  400180

## (undated) DEVICE — SU VICRYL+ 3-0 27IN SH UND VCP416H

## (undated) DEVICE — DRSG STERI STRIP 1/2X4" R1547

## (undated) DEVICE — GLOVE BIOGEL PI SZ 6.5 40865

## (undated) DEVICE — SOL NACL 0.9% INJ 1000ML BAG 2B1324X

## (undated) DEVICE — CLIP ENDO HEMO-LOC PURPLE LG 544240

## (undated) DEVICE — SU WND CLOSURE VLOC 90 ABS 3-0 VIOLET 6" CV-23 VLOCM0804

## (undated) DEVICE — GLOVE BIOGEL PI MICRO SZ 7.5 48575

## (undated) DEVICE — ENDO TROCAR CONMED AIRSEAL BLADELESS 12X120MM IAS12-120LP

## (undated) DEVICE — NDL INSUFFLATION 13GA 120MM C2201

## (undated) DEVICE — PROTECTOR ARM ONE-STEP TRENDELENBURG 40418

## (undated) DEVICE — SU VICRYL 0 CT-1 27" J340H

## (undated) RX ORDER — BUPIVACAINE HYDROCHLORIDE 2.5 MG/ML
INJECTION, SOLUTION EPIDURAL; INFILTRATION; INTRACAUDAL
Status: DISPENSED
Start: 2024-12-13

## (undated) RX ORDER — FENTANYL CITRATE 0.05 MG/ML
INJECTION, SOLUTION INTRAMUSCULAR; INTRAVENOUS
Status: DISPENSED
Start: 2024-12-13

## (undated) RX ORDER — CEFAZOLIN SODIUM/WATER 2 G/20 ML
SYRINGE (ML) INTRAVENOUS
Status: DISPENSED
Start: 2024-12-13

## (undated) RX ORDER — ACETAMINOPHEN 325 MG/1
TABLET ORAL
Status: DISPENSED
Start: 2024-12-13

## (undated) RX ORDER — HYDROMORPHONE HYDROCHLORIDE 1 MG/ML
INJECTION, SOLUTION INTRAMUSCULAR; INTRAVENOUS; SUBCUTANEOUS
Status: DISPENSED
Start: 2024-12-13

## (undated) RX ORDER — KETOROLAC TROMETHAMINE 30 MG/ML
INJECTION, SOLUTION INTRAMUSCULAR; INTRAVENOUS
Status: DISPENSED
Start: 2024-10-10

## (undated) RX ORDER — BUPIVACAINE HYDROCHLORIDE 2.5 MG/ML
INJECTION, SOLUTION EPIDURAL; INFILTRATION; INTRACAUDAL
Status: DISPENSED
Start: 2024-10-10

## (undated) RX ORDER — PROPOFOL 10 MG/ML
INJECTION, EMULSION INTRAVENOUS
Status: DISPENSED
Start: 2024-10-10

## (undated) RX ORDER — CEFAZOLIN SODIUM 2 G/50ML
SOLUTION INTRAVENOUS
Status: DISPENSED
Start: 2024-10-10

## (undated) RX ORDER — LIDOCAINE HYDROCHLORIDE 10 MG/ML
INJECTION, SOLUTION EPIDURAL; INFILTRATION; INTRACAUDAL; PERINEURAL
Status: DISPENSED
Start: 2024-07-02

## (undated) RX ORDER — PROPOFOL 10 MG/ML
INJECTION, EMULSION INTRAVENOUS
Status: DISPENSED
Start: 2023-02-03

## (undated) RX ORDER — CEFAZOLIN SODIUM 1 G/3ML
INJECTION, POWDER, FOR SOLUTION INTRAMUSCULAR; INTRAVENOUS
Status: DISPENSED
Start: 2024-10-10

## (undated) RX ORDER — HEPARIN SODIUM 5000 [USP'U]/.5ML
INJECTION, SOLUTION INTRAVENOUS; SUBCUTANEOUS
Status: DISPENSED
Start: 2024-12-13

## (undated) RX ORDER — ACETAMINOPHEN 325 MG/1
TABLET ORAL
Status: DISPENSED
Start: 2024-10-10

## (undated) RX ORDER — FENTANYL CITRATE 50 UG/ML
INJECTION, SOLUTION INTRAMUSCULAR; INTRAVENOUS
Status: DISPENSED
Start: 2024-12-13

## (undated) RX ORDER — ONDANSETRON 2 MG/ML
INJECTION INTRAMUSCULAR; INTRAVENOUS
Status: DISPENSED
Start: 2024-10-10